# Patient Record
Sex: MALE | Race: BLACK OR AFRICAN AMERICAN | HISPANIC OR LATINO | Employment: FULL TIME | URBAN - METROPOLITAN AREA
[De-identification: names, ages, dates, MRNs, and addresses within clinical notes are randomized per-mention and may not be internally consistent; named-entity substitution may affect disease eponyms.]

---

## 2017-01-09 ENCOUNTER — OFFICE VISIT (OUTPATIENT)
Dept: INTERNAL MEDICINE | Facility: CLINIC | Age: 57
End: 2017-01-09
Payer: COMMERCIAL

## 2017-01-09 VITALS
SYSTOLIC BLOOD PRESSURE: 142 MMHG | OXYGEN SATURATION: 98 % | BODY MASS INDEX: 29.44 KG/M2 | DIASTOLIC BLOOD PRESSURE: 94 MMHG | HEIGHT: 72 IN | WEIGHT: 217.38 LBS | HEART RATE: 75 BPM | TEMPERATURE: 97 F

## 2017-01-09 DIAGNOSIS — M54.2 NECK PAIN: Primary | ICD-10-CM

## 2017-01-09 DIAGNOSIS — M54.10 RADICULITIS: ICD-10-CM

## 2017-01-09 DIAGNOSIS — Z00.00 PREVENTATIVE HEALTH CARE: ICD-10-CM

## 2017-01-09 PROCEDURE — 99213 OFFICE O/P EST LOW 20 MIN: CPT | Mod: S$GLB,,, | Performed by: INTERNAL MEDICINE

## 2017-01-09 PROCEDURE — 1159F MED LIST DOCD IN RCRD: CPT | Mod: S$GLB,,, | Performed by: INTERNAL MEDICINE

## 2017-01-09 PROCEDURE — 99999 PR PBB SHADOW E&M-EST. PATIENT-LVL III: CPT | Mod: PBBFAC,,, | Performed by: INTERNAL MEDICINE

## 2017-01-09 RX ORDER — METHYLPREDNISOLONE 4 MG/1
TABLET ORAL
Qty: 1 PACKAGE | Refills: 0 | Status: SHIPPED | OUTPATIENT
Start: 2017-01-09 | End: 2017-02-16

## 2017-01-09 RX ORDER — GABAPENTIN 100 MG/1
CAPSULE ORAL
Qty: 90 CAPSULE | Refills: 0 | Status: SHIPPED | OUTPATIENT
Start: 2017-01-09 | End: 2017-02-16 | Stop reason: SDUPTHER

## 2017-01-09 NOTE — MR AVS SNAPSHOT
[facility]  [address] [facility] [place] [address]  Phone: [phone]  Fax: [phone]                  Rafa Padilla   2017 2:20 PM   Office Visit    Description:  Male : 1960   Provider:  Da Weston MD   Department:  [facility]           Reason for Visit     Follow-up           Diagnoses this Visit        Comments    Neck pain    -  Primary     Radiculitis         Preventative health care                To Do List           Future Appointments        Provider Department Dept Phone    2017 8:05 AM LABORATORY, [facility] [phone]    3/6/2017 2:20 PM Da Weston MD [facility] [phone]      Goals (5 Years of Data)     None      Follow-Up and Disposition     Return in about 8 weeks (around 3/6/2017), or if symptoms worsen or fail to improve.       These Medications        Disp Refills Start End    gabapentin (NEURONTIN) 100 MG capsule 90 capsule 0 2017     TAKE 1 CAPSULE(100 MG) BY MOUTH THREE TIMES DAILY    Pharmacy: [facility] [address] [place] [address] [address] [employer] [facility] AT [facility] Ph #: [phone]       methylPREDNISolone (MEDROL, JESSA,) 4 mg tablet 1 Package 0 2017     use as directed    Pharmacy: [facility] [address] [place] [phone] [facility] AT [facility] Ph #: [phone]         [facility]     [facility] On Call Nurse Care Line -  Assistance  Registered nurses in the [facility] provide clinical advisement, health education, appointment booking, and other advisory services.  Call for this free service at [phone].             Medications           Message regarding Medications     Verify the changes and/or additions to your medication regime listed below are the same as discussed with your clinician today.  If any of these changes or additions are incorrect, please notify your  healthcare provider.        START taking these NEW medications        Refills    methylPREDNISolone (MEDROL, JESSA,) 4 mg tablet 0    Sig: use as directed    Class: Normal           Verify that the below list of medications is an accurate representation of the medications you are currently taking.  If none reported, the list may be blank. If incorrect, please contact your healthcare provider. Carry this list with you in case of emergency.           Current Medications     aspirin 81 mg Tab Take by mouth. 1 Tablet Oral Every day    gabapentin (NEURONTIN) 100 MG capsule TAKE 1 CAPSULE(100 MG) BY MOUTH THREE TIMES DAILY    meloxicam (MOBIC) 15 MG tablet TAKE 1 TABLET(15 MG) BY MOUTH EVERY DAY    tamsulosin (FLOMAX) 0.4 mg Cp24 Take 2 capsules (0.8 mg total) by mouth once daily.    methylPREDNISolone (MEDROL, JESSA,) 4 mg tablet use as directed           Clinical Reference Information           Vital Signs - Last Recorded  Most recent update: 1/9/2017  2:28 PM by Tamy Franks MA    BP Pulse Temp Ht Wt SpO2    (!) 142/94 (BP Location: Right arm, Patient Position: Sitting) 75 97.1 °F (36.2 °C) (Tympanic) 6' (1.829 m) 98.6 kg (217 lb 6 oz) 98%    BMI                29.48 kg/m2          Blood Pressure          Most Recent Value    BP  (!)  142/94      Allergies as of 1/9/2017     No Known Allergies      Immunizations Administered on Date of Encounter - 1/9/2017     None      Orders Placed During Today's Visit      Normal Orders This Visit    Ambulatory Referral to Physiatry     Future Labs/Procedures Expected by Expires    CBC auto differential  1/9/2017 3/10/2018    Comprehensive metabolic panel  1/9/2017 3/10/2018    Lipid panel  1/9/2017 3/10/2018    PSA, Screening  1/9/2017 3/10/2018    TSH  1/9/2017 3/10/2018    Vitamin D  1/9/2017 3/10/2018      Smoking Cessation     If you would like to quit smoking:   You may be eligible for free services if you are a [place] resident and started smoking cigarettes before  September 1, 1988.  Call the [employer]oking [employer]essation [employer] ([employer]) toll free at ([phone] or [phone].   Call 8-613-QUIT-NOW if you do not meet the above criteria.

## 2017-01-09 NOTE — PROGRESS NOTES
Subjective:      Patient ID: Rafa Padilla is a 56 y.o. male.    Chief Complaint: Follow-up    HPI Comments: 57 yo with Patient Active Problem List:     BPH (benign prostatic hyperplasia)     Hyperlipidemia     Spinal stenosis, lumbar     DDD (degenerative disc disease), lumbar     Anxiety     Tobacco use     Prediabetes    Here today here today for f/u on neck pain. Mild improvement since last visit.  Less intense and less frequency. No trauma.  No neck pain.      Review of Systems   Constitutional: Negative for chills and fever.   Respiratory: Negative for cough, shortness of breath and wheezing.    Neurological: Negative for syncope and weakness.     Objective:     Visit Vitals    BP (!) 142/94 (BP Location: Right arm, Patient Position: Sitting)    Pulse 75    Temp 97.1 °F (36.2 °C) (Tympanic)    Ht 6' (1.829 m)    Wt 98.6 kg (217 lb 6 oz)    SpO2 98%    BMI 29.48 kg/m2       Physical Exam   Constitutional: He is oriented to person, place, and time. He appears well-developed and well-nourished.   Eyes: EOM are normal. Pupils are equal, round, and reactive to light.   Neck: Normal range of motion.   Cardiovascular: Normal rate and regular rhythm.    Pulmonary/Chest: Effort normal and breath sounds normal.   Musculoskeletal: He exhibits no edema.   Neurological: He is alert and oriented to person, place, and time. He displays normal reflexes. No cranial nerve deficit. He exhibits normal muscle tone.   Skin: Skin is warm and dry.   Psychiatric: He has a normal mood and affect. His behavior is normal.       Assessment:     1. Neck pain    2. Radiculitis    3. Preventative health care      Plan:   Neck pain  -     Ambulatory Referral to Physiatry    Radiculitis  -     Ambulatory Referral to Physiatry  -     gabapentin (NEURONTIN) 100 MG capsule; TAKE 1 CAPSULE(100 MG) BY MOUTH THREE TIMES DAILY  Dispense: 90 capsule; Refill: 0  -     methylPREDNISolone (MEDROL, JESSA,) 4 mg tablet; use as directed  Dispense: 1  Package; Refill: 0    [facility]  -     Comprehensive metabolic panel; Future; Expected date: 1/9/17  -     CBC auto differential; Future; Expected date: 1/9/17  -     TSH; Future; Expected date: 1/9/17  -     Vitamin D; Future; Expected date: 1/9/17  -     PSA, Screening; Future; Expected date: 1/9/17  -     Lipid panel; Future; Expected date: 1/9/17              Return in about 8 weeks (around 3/6/2017), or if symptoms worsen or fail to improve.

## 2017-01-10 ENCOUNTER — INITIAL CONSULT (OUTPATIENT)
Dept: PHYSICAL MEDICINE AND REHAB | Facility: CLINIC | Age: 57
End: 2017-01-10
Payer: COMMERCIAL

## 2017-01-10 VITALS
SYSTOLIC BLOOD PRESSURE: 155 MMHG | RESPIRATION RATE: 14 BRPM | HEIGHT: 72 IN | WEIGHT: 217 LBS | BODY MASS INDEX: 29.39 KG/M2 | HEART RATE: 72 BPM | DIASTOLIC BLOOD PRESSURE: 78 MMHG

## 2017-01-10 DIAGNOSIS — M79.18 MYOFASCIAL PAIN: ICD-10-CM

## 2017-01-10 DIAGNOSIS — R29.898 ARM WEAKNESS: ICD-10-CM

## 2017-01-10 DIAGNOSIS — M47.812 DJD (DEGENERATIVE JOINT DISEASE), CERVICAL: Primary | ICD-10-CM

## 2017-01-10 PROCEDURE — 99999 PR PBB SHADOW E&M-EST. PATIENT-LVL III: CPT | Mod: PBBFAC,,, | Performed by: PHYSICAL MEDICINE & REHABILITATION

## 2017-01-10 PROCEDURE — 1159F MED LIST DOCD IN RCRD: CPT | Mod: S$GLB,,, | Performed by: PHYSICAL MEDICINE & REHABILITATION

## 2017-01-10 PROCEDURE — 99204 OFFICE O/P NEW MOD 45 MIN: CPT | Mod: S$GLB,,, | Performed by: PHYSICAL MEDICINE & REHABILITATION

## 2017-01-10 NOTE — PROGRESS NOTES
PM&R NEW PATIENT HISTORY & PHYSICAL :    Referring Physician: Enrico    Chief Complaint   Patient presents with    Neck Pain    Shoulder Pain     left, to the arm    Numbness     left arm       HPI: This is a 56 y.o.  male being seen in clinic today for evaluation of left neck and shoulder pain over the past 3 months.  He describes achy pain and tightness in his neck and shoulder that is worse with prolonged sitting at his computer at work.  He was recently started on steroid and gabapentin which provided relief. At times he has numbness/tingling into his arm and fingers    History obtained from patient    Functional History:  Walking: Not limited  Transfers: Independent  Assistive devices: No  Power mobility: No  Falls: None   Directional preference:    Employment status:    Needs help with:  Nothing - all ADLS normal    Review of Systems:     General- denies lethargy, weight change, fever, chills  Head/neck- denies swallowing difficulties  ENT- denies hearing changes  Cardiovascular-denies chest pain  Pulmonary- denies shortness of breath  GI- denies constipation or bowel incontinence  - denies bladder incontinence  Skin- denies wounds or rashes  Musculoskeletal- denies weakness, +pain  Neurologic- +/-numbness and tingling  Psychiatric- denies depressive or psychotic features, denies anxiety  Lymphatic-denies swelling  Endocrine- denies hypoglycemic symptoms/DM history    Physical Examination:  General: Well developed, well nourished male, NAD    Spinal Examination: CERVICAL  Active ROM is within normal limits.  Inspection: No deformity of spinal alignment.  Palpation: No vertebral tenderness to percussion.  ttp and tight bands at left trapezius, splenius capitus and rhomboid  Spurling test: neg    Spinal Examination: LUMBAR or THORACIC  Active ROM is within normal limits.  Inspection: No deformity of spinal alignment.  No palpable olisthesis.    Musculoskeletal Tests:  Phalen: neg   Elbow compression  (ulnar): neg  Tinel at wrist: neg  Scratch-Collapse Test:     Bilateral Upper and Lower Extremities:  Pulses are 2+ at radial,  bilaterally.  Shoulder/Elbow/Wrist/Hand ROM wnl except rot cuff weakness on left  Hip/Knee/Ankle ROM wnl  Bilateral Extremities show normal capillary refill.  No signs of cyanosis, rubor, edema, skin changes, or dysvascular changes of appendages.  Nails appear intact.    Neurological Exam:  Cranial Nerves:  II-XII grossly intact    Manual Muscle Testing: (Motor 5=normal)    RIGHT Upper extremity: Shoulder abduction 5/5, Biceps 5/5, Triceps 5/5, Wrist extension 5/5, Abductor pollicis brevis 5/5, Ulnar hand intrinsics 5/5,  LEFT Upper extremity: Shoulder abduction 5/5, Biceps 5/5, Triceps 5/5, Wrist extension 5/5, Abductor pollicis brevis 5/5, Ulnar hand intrinsics 5/5,      No focal atrophy is noted of either upper or lower extremity.    Bilateral Reflexes:1+bic tric br  Feldman's response is absent bilaterally.  No clonus at knee or ankle.    Sensation: tested to light touch  - intact in arms  Gait: Narrow base and good arm swing.    Cerebellar:tandem gait.     IMPRESSION/PLAN: This is a 56 y.o.  male with cervical DJD/DDD, left rotator cuff weakness, myofacial pain     1. rx for PT --Myofascial release, stretch, ROM, cuff strengthening, modalities, dec pain  2. Cont gabapentin and mobic (once steroid is finished)  3. Ice/heat modalities  4. Handouts provided  5. Fu 6 wks, if not improved with get NCS/EMG or MRI    Janel Medrano M.D.  Physical Medicine and Rehab

## 2017-01-10 NOTE — PATIENT INSTRUCTIONS
Ergonomics: Does Your Workstation Fit You?  You may not know it, but working at your computer can take a toll on your body. It can cause sore muscles, headaches, eyestrain, tension, and fatigue. But ergonomics, the science of arranging your workstation to fit you and your body, can help. Read on to find out how ergonomics can help solve problems you may be having.    If your eyes are really tired at the end of the day, adjusting your monitor or lighting may bring relief.  If your neck and shoulders are often stiff and sore, your chair height, monitor, or keyboard may need adjusting.  If you ever feel pain or discomfort in your back while working at your computer, adjusting your backrest or sitting posture may take strain off your back.  If you feel tingling, numbness, or pain in your forearms, wrists, or hands, your chair height or keyboard may need adjusting.  If your body feels tired, achy, or stiff at the end of the day, you may need to take more frequent stretch breaks.  If your legs are often stiff and cramped, or you have swelling and numbness in your ankles and feet, your chair might not be at the best height for you or you may need a footrest.  © 2066-1288 [employer]. [address] [place] [address], [place] [address]. All rights reserved. This information is not intended as a substitute for professional medical care. Always follow your healthcare professional's instructions.        Nonsurgical Treatment of Cervical Spine Problems  Cervical spine problems can often be treated without surgery. Choices include rest, medicines, or injections. Your healthcare provider may also suggest physical therapy or certain exercises. These may all help to relieve your symptoms. These treatments are often successful. But if your symptoms dont subside, talk to your healthcare provider. He or she may tell you that surgery is your best choice.  Relieving your symptoms  Your healthcare provider may  recommend:  · Medicines. These help to reduce pain and inflammation.  · Epidural steroid injections. These are injections into the spinal canal near the spinal cord. They may relieve severe pain and reduce inflammation.  · Restricting aggravating activities. This can help to give your cervical spine time to heal.  · A soft cervical collar. This can help to support your head while keeping your cervical spine aligned.  · Traction. This may help relieve pressure on the irritated nerves.  Restoring mobility and strength  Your healthcare provider may recommend that you work with a physical therapist. This can help you regain mobility and strength in your neck. Physical therapy may last for 4 to 8 weeks. It may include:  · Exercises to improve your necks range of motion and strength.  · Evaluation and correction of posture and body movements. This can correct problems that affect your cervical spine.  · Heat, massage, and traction to help relieve your symptoms.  Self-care  Youll take an active role in your own therapy. To protect your neck from further injury:  · Follow any exercise program given to you by your healthcare provider or physical therapist.  · Practice good posture while sitting, standing, or moving.  · Have your workspace evaluated. Rearrange it if needed.  · When lying down, support your neck. You can use a special cervical pillow or rolled-up towel.   © 3688-9341 [employer]. [address] [place] [address], [place] [address]. All rights reserved. This information is not intended as a substitute for professional medical care. Always follow your healthcare professional's instructions.        Myofascial Pain Syndrome: Fibrositis  Your pain is caused by a state of chronic muscle tension. This condition is called by various names: myofascial pain, fibrositis and trigger point pain. This can also be due to mechanical stress (such as working at a computer terminal for long periods; or work that requires  repetitive motions of the arms or hands) or emotional stress (such as problems on the job or in your personal life). Sometimes there is no obvious cause. The pain can occur in the area of the muscle spasm or at a site distant to it. For example, spasm of a neck muscle can cause headache. Spasm of the muscle near the shoulder blade can cause pain shooting down the arm.  Home Care:  · Try to identify the factors that may be causing your problem and change them:  ¨ If you feel that emotional stress is a cause of your pain, learn methods to deal more effectively with the stress in your life. These may include regular exercise, muscle relaxation techniques, meditation or simply taking time out for yourself. Consult your doctor or go to a local bookstore and review the many books and tapes available on the subject of stress reduction.  ¨ If you feel that physical stress is a cause for your pain, try to modify any poor work habits.  · You may use acetaminophen (Tylenol) or ibuprofen (Motrin, Advil) to control pain, unless another medicine was prescribed. [NOTE: If you have chronic liver or kidney disease or ever had a stomach ulcer or GI bleeding, talk with your doctor before using these medicines.]  · The use of heat to the muscle (hot compress or heating pad) will be helpful to reduce muscle spasm. Some persons get relief with ice packs. Apply an ice pack (crushed or cubed ice in a plastic bag, wrapped in a towel) for 20 minutes at a time as needed. Use the method that feels best to you.  · Massaging the trigger point and stretching out the muscle are an important parts of prevention and treatment. Trigger point massage can be done by first applying heat to the area to warm and prepare the muscle. Have someone apply steady thumb pressure directly on the knot in the muscle (the most tender point) for 30 seconds. Release the pressure, then massage the surrounding muscle. Repeat the process, applying more pressure to the  trigger point each time. Do this up to the limit of pain. With each treatment, the trigger point should become less tender and the pain should decrease. You can apply local pressure to trigger points in the back by lying on the floor with a tennis ball under the trigger point.  Follow Up  with your doctor as advised or if not improving within the next week. It may be necessary for you to receive physical therapy if you do not respond to home treatment alone.  Get Prompt Medical Attention  if any of the following occur:  · If your trigger point is in the chest muscles, observe for pain that becomes more severe, lasts longer, or spreads into your shoulder/arm, neck or back; you develop trouble breathing, sweating, nausea or vomiting in association with chest pain  · If you develop weakness or numbness in an extremity  · If your pain worsens, regardless of its location  © 8167-2408 [employer]. [address] [place] [address]. All rights reserved. This information is not intended as a substitute for professional medical care. Always follow your healthcare professional's instructions.          Trigger Point Injection  The cause of your muscle pain or spasms may be one or more trigger points. Your health care provider may decide to inject the painful spots to relax the muscle. This can help relieve your pain. Relaxing the muscle can also make movement easier. You may then be able to exercise to strengthen the muscle and help it heal.    What is a trigger point?  A trigger point is a tight, painful knot of muscle fiber. It can form where a muscle is strained or injured. The knot can sometimes be felt under the skin. A trigger point is very tender to the touch. Pain may also spread to other parts of the affected muscle. Muscles around a knee, shoulder blade, or other bones are prone to trigger points. This is because these muscles are more likely to be injured.    About the injections  Any muscle  in the body can have one or more trigger points. Several injections may be needed in each trigger point to best relieve pain. These injections may be given in sessions about 2 weeks apart, depending on the preference of your health care provider. In some cases, you may not feel much change in your symptoms until after the third injection.     © 7560-7299 [employer]. [address] [place] [address]. All rights reserved. This information is not intended as a substitute for professional medical care. Always follow your healthcare professional's instructions.            Your Neck Muscles  The muscles in the neck and shoulders need to be strong to hold the neck and head in place. These muscles also help move the neck and shoulders. Your health care provider can recommend exercises to help stretch and strengthen your neck muscles.    © 1952-6036 [employer]. [address] [place] [address]. All rights reserved. This information is not intended as a substitute for professional medical care. Always follow your healthcare professional's instructions.          Neck Problems: Relieving Your Symptoms  The first goal of treatment is to relieve your symptoms. Your health care provider may recommend self-care treatments. These include resting, applying ice and heat, taking medication, and doing exercises. Your health care provider may also recommend that you see a physical therapist, who can teach you ways to care for and strengthen your neck.    Self-Care Treatments  Pain can end quickly or last awhile. Either way, youll want relief as soon as possible. Your health care provider can tell you which treatments to do at home to help relieve your pain.  · Lying down for a short time takes pressure from the head off the neck.  · Ice and heat can help reduce pain. To bring down swelling, rest an ice pack wrapped in a thin towel on your neck for 15 minutes. To relax sore muscles, apply a  warm, wet towel to the area. Or take a warm bath or shower.  · Over-the-counter medications, such as ibuprofen, naproxen, and aspirin, can help reduce pain and swelling. Acetaminophen can help relieve pain. Use these only as directed.  · Exercises can relax muscles and prevent stiffness. To prepare, drape a warm, wet towel around your neck and shoulders for 5 minutes. Remove the towel. Then do any exercises recommended to you by your health care provider.  Physical Therapy  If self-care treatments arent helping relieve neck pain, your health care provider may suggest one or more sessions of physical therapy. Physical therapy is performed by a specialist trained to treat injuries. Your physical therapist (PT) will teach you how to strengthen muscles, improve the spines alignment, and help you move properly. Treatment methods used in physical therapy may include:  · Heat. A special heating pad called a neck pack may be applied to your neck.  · Exercises. Your PT will teach you exercises to help strengthen your neck and improve its range of motion.  · Joint mobilization. The PT gently moves your vertebrae to help restore motion in your neck joints and reduce neck pain.  · Soft tissue mobilization. The PT massages and stretches the muscles in your neck and shoulders.  · Electrical stimulation. Electrical impulses are sent into your neck. This helps reduce soreness and inflammation.  · Education in body mechanics. The PT shows you ways to position and move your body that protect the neck.  Other Treatments  If physical therapy doesnt relieve your neck pain, your health care provider may suggest other treatments. For example, medications or injections can help relieve pain and swelling. In some cases, surgery may be needed to treat neck problems.  © 6025-3646 [employer]. [address] [place] [address], [place] [address]. All rights reserved. This information is not intended as a substitute for professional  medical care. Always follow your healthcare professional's instructions.          Understanding Neck Problems       If you suffer from neck pain, youre not alone. Many people have neck pain at some point in their lives. Problems such as poor posture, injury, and wear and tear can lead to neck pain. Your health care provider will work with you to find the treatment thats best for your neck.  Types of Neck Problems  The following problems can cause pain or injury in your neck:  · Strains and sprains: Strains (stretched or torn muscles) and sprains (stretched or torn ligaments) can cause neck pain. Strains and sprains can occur during an accident, or when you overuse your neck through repetitive motion. They can also cause your muscles and ligaments to become inflamed (swollen and painful).  · Whiplash and other injuries: Whiplash can result when an impact throws your head, forcing your neck too far forward (hyperflexion), then too far backward (hyperextension). When combined, the two motions can cause a painful injury to different parts of your neck, such as muscles, ligaments, or joints. The most common cause of whiplash is a car accident. But it can also happen during a fall or sports injury.  · Weakened disks: A simple action, such as a sneeze or a cough, can cause one of your disks to bulge (herniate). A herniated disk can put pressure on your nerve and cause pain. Over time, disks can also thin out (degenerate). Flattened disks dont cushion vertebrae well and can cause vertebrae to rub together. Rubbing vertebrae can pinch nerves and cause pain.  · Weakened joints: Aging and injury can cause joints to slowly degenerate. Thinned joints can also cause vertebrae to rub together. This can cause abnormal growths of bone (bone spurs) to form on vertebrae. Bone spurs put pressure on nerves, causing pain.  Common Symptoms  If you have a neck problem, you may have one or more of the following symptoms:  · Muscle  tension and spasm: You may not be able to move your neck, arms, or shoulders comfortably if you have muscle tension or stiffness in your neck. If your symptoms arent relieved, you may experience muscle spasms, or knots of contracted tissue (trigger points) in areas of your neck and shoulders.  · Aches and pains: Dull aches in your head or neck, sharp pains, and swelling of the soft tissue of your neck and shoulders are common symptoms. If theres pressure on the nerves in your neck, you may feel pain in your arms or hands (referred pain).  · Numbness or weakness: If you injure the nerves in your neck, you may experience numbness, tingling, or weakness in your shoulders, arms, or hands. These symptoms arise when disks or bone spurs press on the nerves in your neck.  © 6177-2191 [employer]. [address] [place] [address]. All rights reserved. This information is not intended as a substitute for professional medical care. Always follow your healthcare professional's instructions.          Neck Spasm [No Trauma]    Spasm of the neck muscles can occur after a sudden awkward neck movement. Sleeping with your neck in a crooked position can also cause spasm. Some persons respond to emotional stress by tensing the muscles of their neck, shoulders and upper back. If neck spasm lasts long enough, it can cause headache.  The treatment described below will usually help the pain to go away in 5-7 days. Pain that continues may require further evaluation or other types of treatment such as physical therapy.  Home Care:  1. Rest and relax the muscles. Use a comfortable pillow that supports the head and keeps the spine in a neutral position. The position of the head should not be tilted forward or backward. A rolled up towel may help for a custom fit.  2. Some persons find relief with heat (hot shower, hot bath or heating pad) and massage, while others prefer cold packs (crushed or cubed ice in a plastic  bag, wrapped in a towel). Try both and use the method that feels best for 20 minutes several times a day.  3. You may use acetaminophen (Tylenol) or ibuprofen (Motrin, Advil) to control pain, unless another medicine was prescribed. [NOTE: If you have chronic liver or kidney disease or ever had a stomach ulcer or GI bleeding, talk with your doctor before using these medicines.]  Follow Up  with your doctor or this facility if your symptoms do not show signs of improvement after one week. Physical therapy or further evaluation may be needed.  [NOTE: If x-rays were taken, they will be reviewed by a radiologist. You will be notified of any new findings that may affect your care.]  Return Promptly  or contact your doctor if any of the following occurs:  · Pain becomes worse or spreads into one or both arms  · Weakness or numbness in one or both arms  · Increasing headache with nausea or vomiting  · Fever over 100.4ºF (38.0ºC)  © 1181-7905 [employer] [address] [place] [address]. All rights reserved. This information is not intended as a substitute for professional medical care. Always follow your healthcare professional's instructions.          Know Your Neck: The Cervical Spine  By learning about the parts of the neck, you can better understand your neck problem. The bones of the neck are called cervical vertebrae, commonly identified as C1 through C7. Together, they form a bony column called the spine. Vertebrae also protect the spinal cord, a pathway for messages to reach the brain. Surrounding the spine are soft tissues such as muscles, tendons, and nerves.        Flexibility Is Key  For the neck to function normally, it has to be flexible enough to move without discomfort. A healthy neck can move easily in six different directions.    © 8771-2913 [employer] [address] [place] [address]. All rights reserved. This information is not intended as a substitute for  professional medical care. Always follow your healthcare professional's instructions.          Protecting Your Neck: Posture and Body Mechanics  Protecting your neck from injuries and pain involves practicing good posture and body mechanics. This may mean correcting bad habits you have related to the way you hold and move your body. The tips below can help you improve your posture and body mechanics.    What Is Posture and Why Does It Matter?  Posture is the way you hold your body. For many of us, this means hunching over, thrusting the chin forward, and slouching the shoulders. But this kind of poor posture keeps muscles from properly supporting the neck and puts stress on muscles, disks, ligaments, and joints in your neck. As a result, injury and pain can occur.  How Is Your Posture?  Use a full-length mirror to check your posture. To begin, stand normally. Then slowly back up against a wall. Is there space between your head and the wall? Do you slouch your shoulders? Is your chin pointing up or down? All these can cause neck pain and injury.  Improving Your Posture  Follow these steps to improve your posture:  · Pull your shoulders back.  · Think of the ears, shoulders, and hips as a series of dots. Now, adjust your body to connect the dots in a straight line.  · Keep your chin level.  What Are Body Mechanics and Why Do They Matter?  The way you move and position your body during daily activities is called body mechanics. Good body mechanics help protect the neck. This means learning the right ways to stand, sit, and even sleep. So do whats best for your neck and practice good body mechanics.  Standing   To protect your neck while standing:  · Carry objects close to your body.  · Keep your ears and shoulders in a line while standing or walking.  · To lower yourself, bend at the knees with a straight back. Do this instead of looking down and reaching for objects.  · Work at eye level. Dont reach above your head  or tilt your head back.  Sitting   To protect your neck while sitting:  · Set up your workstation so your monitor is at eye level. Also, use a document phillips when viewing papers or books.  · Keep your knees at or slightly below the level of your hips.  · Sit up straight, with feet flat on the floor. If your feet dont touch the floor, use a footrest.  · Avoid sitting or driving for long periods. Take frequent breaks.  Sleeping   To protect your neck while sleeping:  · Sleep on your back with a pillow under your knees, or on your side with a pillow between bent knees. This helps align the spine.  · Avoid using pillows that are too high or too low. Instead, use a neck roll or pillow under your neck while you sleep to keep the neck straight.  · Sleep on a mattress that supports you, with a pillow under your neck.  © 0484-6339 [employer]. [address] [place], [place] [id number]. All rights reserved. This information is not intended as a substitute for professional medical care. Always follow your healthcare professional's instructions.          Exercises at Your Workstation: Eyes, Neck, and Head     Tired eyes? Stiff neck? A few easy moves can help prevent these kinds of problems. Take a few minutes during your day to do these exercises--right at your desk. They'll loosen up your muscles, keep you more alert, and make a big difference in how you work and feel.    For your eyes  Eye cup  · Lean forward with your elbows on your desk.  · Cup your hands and place them lightly over your closed eyes. Hold for a minute, while breathing deeply in and out.  · Slowly uncover and open your eyes. Repeat 2 times.  Eye roll  · Close your eyes. Slowly roll your eyeballs clockwise all the way around. Repeat 3 times.  · Now slowly roll them all the way around counterclockwise. Repeat 3 times.  Eye rest  · Every 20 minutes, look away from the computer screen. Focus on an object at least 20 feet away. Stay focused on  this object for a full 20 seconds.    For your neck and head  Warm-up  · Drop your head gently to your chest. While breathing in, slowly roll your head up to your left shoulder. While breathing out, slowly roll your head back to center. Repeat to the right.  · Repeat 3 times on each side.  Head tilt  · Sit up straight. Tuck in your chin.  · Slowly tip your head to the left. Return to the center. Then, tip your head to the right.  · Repeat 3 times on each side.    Head turn  · Sit up straight.  · Slowly turn your head and look over your left shoulder. Hold for a few seconds. Go back to the center, then repeat to your right.  · Repeat 3 times on each side.  © 5908-5767 [employer]. [address] [place] [address]. All rights reserved. This information is not intended as a substitute for professional medical care. Always follow your healthcare professional's instructions.          Reach and Hold Exercise    Do this exercise on your hands and knees. Keep your knees under your hips and your hands under your shoulders. Keep your spine in a neutral position (not arched or sagging). Keep your ears in line with your shoulders. Hold for a few seconds before starting the exercise:  4. Tighten your abdominal muscles and raise one arm straight in front of you, palm down. Hold for 5 seconds, then lower. Repeat 5 times.  5. Do the exercise again, this time lifting your arm to the side. Repeat 5 times.  6. Do the exercise again, this time lifting your arm backward, palm up. Repeat 5 times.  Switch sides and do each exercise with the other arm.  © 5122-4282 [employer]. [address] [place] [address]. All rights reserved. This information is not intended as a substitute for professional medical care. Always follow your healthcare professional's instructions.        Shoulder and Upper Back Stretch  To start, stand tall with your ears, shoulders, and hips in line. Your feet should be  slightly apart, positioned just under your hips. Focus your eyes directly in front of you.  this position for a few seconds before starting your exercise. This helps increase your awareness of proper posture.  Reach overhead and slightly back with both arms. Keep your shoulders and neck aligned and your elbows behind your shoulders:  · With your palms facing the ceiling, turn your fingers inward.  · Take a deep breath. Breathe out, and lower your elbows toward your buttocks. Hold for 5 seconds, then return to starting position.  · Repeat 3 times.    © 5506-0385 [employer] [address] [place] [address]. All rights reserved. This information is not intended as a substitute for professional medical care. Always follow your healthcare professional's instructions.          Shoulder Clock Exercise  To start, stand tall with your ears, shoulders, and hips in line. Your feet should be slightly apart, positioned just under your hips. Focus your eyes directly in front of you.  this position for a few seconds before starting your exercise. This helps increase your awareness of proper posture.  · Imagine that your right shoulder is the center of a clock. With the outer point of your shoulder, roll it around to slowly trace the outer edge of the clock.  · Move clockwise first, then counterclockwise.  · Repeat 3 times. Switch shoulders.   © 2320-0751 [employer] [address] [place] [address]. All rights reserved. This information is not intended as a substitute for professional medical care. Always follow your healthcare professional's instructions.          Shoulder Girdle Stretch     To start, sit in a chair with your feet flat on the floor. Your weight should be slightly forward so that youre balanced evenly on your buttocks. Relax your shoulders and keep your head level. Using a chair with arms may help you keep your balance:  · Place 1 hand on the  outside elbow of the other arm.  · Pull the arm across your body. Hold for 30 to 60 seconds. Repeat once.  · Switch sides.    © 0819-4719 [employer]. [address] [place] [address]. All rights reserved. This information is not intended as a substitute for professional medical care. Always follow your healthcare professional's instructions.          Shoulder Exercises      To start, sit in a chair with your feet flat on the floor. Your weight should be slightly forward so that youre balanced evenly on your buttocks. Relax your shoulders and keep your head level. Avoid arching your back or rounding your shoulders. Using a chair with arms may help you keep your balance.  · Raise your arms, elbows bent, to shoulder height.  · Slowly move your forearms together. Hold for 5 seconds.  · Return to starting position. Repeat 5 times.  © 2953-0987 [employer]. [address] [place] [address]. All rights reserved. This information is not intended as a substitute for professional medical care. Always follow your healthcare professional's instructions.        Shoulder Shrug Exercise  To start, sit in a chair with your feet flat on the floor. Shift your weight slightly forward to avoid rounding your back. Relax. Keep your ears, shoulders, and hips aligned:  · Raise both of your shoulders as high as you can, as if you were trying to touch them to your ears. Keep your head and neck still and relaxed.  · Hold for a count of 10. Release.  · Repeat 5 times.    © 8941-9705 [employer]. [address] [place] [address]. All rights reserved. This information is not intended as a substitute for professional medical care. Always follow your healthcare professional's instructions.          Shoulder Squeeze Exercise     To start, sit in a chair with your feet flat on the floor. Shift your weight slightly forward to avoid rounding your back. Relax. Keep your ears,  shoulders, and hips aligned:  · Raise your arms to shoulder height, elbows bent and palms forward.  · Move your arms back, squeezing your shoulder blades together.  · Hold for 10 seconds. Return to starting position.   · Repeat 5 times.     © 2131-4696 [employer]. [address] [place] [address], [place] [address]. All rights reserved. This information is not intended as a substitute for professional medical care. Always follow your healthcare professional's instructions.

## 2017-01-10 NOTE — LETTER
January 10, 2017      Da Weston MD  [address] [facility] [place] [address]           [facility] - Physiatry  [address] [facility] [place] [address]  Phone: [phone]  Fax: [phone]          Patient: Rafa Padilla   MR Number: [id number]   YOB: 1960   Date of Visit: 1/10/2017       Dear Dr. Da Weston:    Thank you for referring Rafa Padilla to me for evaluation. Attached you will find relevant portions of my assessment and plan of care.    If you have questions, please do not hesitate to call me. I look forward to following Rafa Padilla along with you.    Sincerely,    Janel Medrano MD    Enclosure  CC:  No Recipients    If you would like to receive this communication electronically, please contact [email or web address] or [phone] to request more information on [employer] Link access.    For providers and/or their staff who would like to refer a patient to [facility], please contact us through our one-stop-shop provider referral line, [facility], at [phone].    If you feel you have received this communication in error or would no longer like to receive these types of communications, please e-mail [email or web address]

## 2017-01-10 NOTE — MR AVS SNAPSHOT
[facility]  [address] [facility] [place] [address]  Phone: [phone]  Fax: [phone]                  Rafa Padilla   1/10/2017 10:00 AM   Initial consult    Description:  Male : 1960   Provider:  Janel Medrano MD   Department:  [facility]           Reason for Visit     Neck Pain     Shoulder Pain     Numbness           Diagnoses this Visit        Comments    DJD (degenerative joint disease), cervical    -  Primary     Myofascial pain         Arm weakness                To Do List           Future Appointments        Provider Department Dept Phone    2017 12:20 PM Janel Medrano MD [facility] [phone]    2017 8:05 AM LABORATORY, [facility] [phone]    3/6/2017 2:20 PM Da Weston MD [facility] Internal Medicine [phone]      Goals (5 Years of Data)     None      [facility]     [facility] - 24/7 Assistance  Registered nurses in the [facility] provide clinical advisement, health education, appointment booking, and other advisory services.  Call for this free service at [phone].             Medications           Message regarding Medications     Verify the changes and/or additions to your medication regime listed below are the same as discussed with your clinician today.  If any of these changes or additions are incorrect, please notify your healthcare provider.             Verify that the below list of medications is an accurate representation of the medications you are currently taking.  If none reported, the list may be blank. If incorrect, please contact your healthcare provider. Carry this list with you in case of emergency.           Current Medications     aspirin 81 mg Tab Take by mouth. 1 Tablet Oral Every day    methylPREDNISolone (MEDROL, JESSA,) 4 mg tablet use as directed    tamsulosin (FLOMAX) 0.4 mg Cp24 Take 2 capsules (0.8 mg total) by mouth once daily.     gabapentin (NEURONTIN) 100 MG capsule TAKE 1 CAPSULE(100 MG) BY MOUTH THREE TIMES DAILY    meloxicam (MOBIC) 15 MG tablet TAKE 1 TABLET(15 MG) BY MOUTH EVERY DAY           Clinical Reference Information           Vital Signs - Last Recorded  Most recent update: 1/10/2017 10:08 AM by Eugenie Sheriff LPN    BP Pulse Resp Ht Wt BMI    (!) 155/78 72 14 6' (1.829 m) 98.4 kg (217 lb) 29.43 kg/m2      Blood Pressure          Most Recent Value    BP  (!)  155/78      Allergies as of 1/10/2017     No Known Allergies      Immunizations Administered on Date of Encounter - 1/10/2017     None      Instructions      Ergonomics: Does Your Workstation Fit You?  You may not know it, but working at your computer can take a toll on your body. It can cause sore muscles, headaches, eyestrain, tension, and fatigue. But ergonomics, the science of arranging your workstation to fit you and your body, can help. Read on to find out how ergonomics can help solve problems you may be having.    If your eyes are really tired at the end of the day, adjusting your monitor or lighting may bring relief.  If your neck and shoulders are often stiff and sore, your chair height, monitor, or keyboard may need adjusting.  If you ever feel pain or discomfort in your back while working at your computer, adjusting your backrest or sitting posture may take strain off your back.  If you feel tingling, numbness, or pain in your forearms, wrists, or hands, your chair height or keyboard may need adjusting.  If your body feels tired, achy, or stiff at the end of the day, you may need to take more frequent stretch breaks.  If your legs are often stiff and cramped, or you have swelling and numbness in your ankles and feet, your chair might not be at the best height for you or you may need a footrest.  © 3761-3539 [employer]. [address] [place] [address], [place] [address]. All rights reserved. This information is not intended as a substitute for  professional medical care. Always follow your healthcare professional's instructions.        Nonsurgical Treatment of Cervical Spine Problems  Cervical spine problems can often be treated without surgery. Choices include rest, medicines, or injections. Your healthcare provider may also suggest physical therapy or certain exercises. These may all help to relieve your symptoms. These treatments are often successful. But if your symptoms dont subside, talk to your healthcare provider. He or she may tell you that surgery is your best choice.  Relieving your symptoms  Your healthcare provider may recommend:  · Medicines. These help to reduce pain and inflammation.  · Epidural steroid injections. These are injections into the spinal canal near the spinal cord. They may relieve severe pain and reduce inflammation.  · Restricting aggravating activities. This can help to give your cervical spine time to heal.  · A soft cervical collar. This can help to support your head while keeping your cervical spine aligned.  · Traction. This may help relieve pressure on the irritated nerves.  Restoring mobility and strength  Your healthcare provider may recommend that you work with a physical therapist. This can help you regain mobility and strength in your neck. Physical therapy may last for 4 to 8 weeks. It may include:  · Exercises to improve your necks range of motion and strength.  · Evaluation and correction of posture and body movements. This can correct problems that affect your cervical spine.  · Heat, massage, and traction to help relieve your symptoms.  Self-care  Youll take an active role in your own therapy. To protect your neck from further injury:  · Follow any exercise program given to you by your healthcare provider or physical therapist.  · Practice good posture while sitting, standing, or moving.  · Have your workspace evaluated. Rearrange it if needed.  · When lying down, support your neck. You can use a special  cervical pillow or rolled-up towel.   © 0540-1316 [employer]. [address] [place] [address], [place] [address]. All rights reserved. This information is not intended as a substitute for professional medical care. Always follow your healthcare professional's instructions.        Myofascial Pain Syndrome: Fibrositis  Your pain is caused by a state of chronic muscle tension. This condition is called by various names: myofascial pain, fibrositis and trigger point pain. This can also be due to mechanical stress (such as working at a computer terminal for long periods; or work that requires repetitive motions of the arms or hands) or emotional stress (such as problems on the job or in your personal life). Sometimes there is no obvious cause. The pain can occur in the area of the muscle spasm or at a site distant to it. For example, spasm of a neck muscle can cause headache. Spasm of the muscle near the shoulder blade can cause pain shooting down the arm.  Home Care:  · Try to identify the factors that may be causing your problem and change them:  ¨ If you feel that emotional stress is a cause of your pain, learn methods to deal more effectively with the stress in your life. These may include regular exercise, muscle relaxation techniques, meditation or simply taking time out for yourself. Consult your doctor or go to a local bookstore and review the many books and tapes available on the subject of stress reduction.  ¨ If you feel that physical stress is a cause for your pain, try to modify any poor work habits.  · You may use acetaminophen (Tylenol) or ibuprofen (Motrin, Advil) to control pain, unless another medicine was prescribed. [NOTE: If you have chronic liver or kidney disease or ever had a stomach ulcer or GI bleeding, talk with your doctor before using these medicines.]  · The use of heat to the muscle (hot compress or heating pad) will be helpful to reduce muscle spasm. Some persons get relief with ice  packs. Apply an ice pack (crushed or cubed ice in a plastic bag, wrapped in a towel) for 20 minutes at a time as needed. Use the method that feels best to you.  · Massaging the trigger point and stretching out the muscle are an important parts of prevention and treatment. Trigger point massage can be done by first applying heat to the area to warm and prepare the muscle. Have someone apply steady thumb pressure directly on the knot in the muscle (the most tender point) for 30 seconds. Release the pressure, then massage the surrounding muscle. Repeat the process, applying more pressure to the trigger point each time. Do this up to the limit of pain. With each treatment, the trigger point should become less tender and the pain should decrease. You can apply local pressure to trigger points in the back by lying on the floor with a tennis ball under the trigger point.  Follow Up  with your doctor as advised or if not improving within the next week. It may be necessary for you to receive physical therapy if you do not respond to home treatment alone.  Get Prompt Medical Attention  if any of the following occur:  · If your trigger point is in the chest muscles, observe for pain that becomes more severe, lasts longer, or spreads into your shoulder/arm, neck or back; you develop trouble breathing, sweating, nausea or vomiting in association with chest pain  · If you develop weakness or numbness in an extremity  · If your pain worsens, regardless of its location  © 8542-9051 [employer]. [address] [place] [address], [place] [address]. All rights reserved. This information is not intended as a substitute for professional medical care. Always follow your healthcare professional's instructions.          Trigger Point Injection  The cause of your muscle pain or spasms may be one or more trigger points. Your health care provider may decide to inject the painful spots to relax the muscle. This can help relieve your pain.  Relaxing the muscle can also make movement easier. You may then be able to exercise to strengthen the muscle and help it heal.    What is a trigger point?  A trigger point is a tight, painful knot of muscle fiber. It can form where a muscle is strained or injured. The knot can sometimes be felt under the skin. A trigger point is very tender to the touch. Pain may also spread to other parts of the affected muscle. Muscles around a knee, shoulder blade, or other bones are prone to trigger points. This is because these muscles are more likely to be injured.    About the injections  Any muscle in the body can have one or more trigger points. Several injections may be needed in each trigger point to best relieve pain. These injections may be given in sessions about 2 weeks apart, depending on the preference of your health care provider. In some cases, you may not feel much change in your symptoms until after the third injection.     © 6475-1106 [employer]. [address] [place] [address]. All rights reserved. This information is not intended as a substitute for professional medical care. Always follow your healthcare professional's instructions.            Your Neck Muscles  The muscles in the neck and shoulders need to be strong to hold the neck and head in place. These muscles also help move the neck and shoulders. Your health care provider can recommend exercises to help stretch and strengthen your neck muscles.    © 7203-4534 [employer]. [address] [place] [address]. All rights reserved. This information is not intended as a substitute for professional medical care. Always follow your healthcare professional's instructions.          Neck Problems: Relieving Your Symptoms  The first goal of treatment is to relieve your symptoms. Your health care provider may recommend self-care treatments. These include resting, applying ice and heat, taking medication, and doing  exercises. Your health care provider may also recommend that you see a physical therapist, who can teach you ways to care for and strengthen your neck.    Self-Care Treatments  Pain can end quickly or last awhile. Either way, youll want relief as soon as possible. Your health care provider can tell you which treatments to do at home to help relieve your pain.  · Lying down for a short time takes pressure from the head off the neck.  · Ice and heat can help reduce pain. To bring down swelling, rest an ice pack wrapped in a thin towel on your neck for 15 minutes. To relax sore muscles, apply a warm, wet towel to the area. Or take a warm bath or shower.  · Over-the-counter medications, such as ibuprofen, naproxen, and aspirin, can help reduce pain and swelling. Acetaminophen can help relieve pain. Use these only as directed.  · Exercises can relax muscles and prevent stiffness. To prepare, drape a warm, wet towel around your neck and shoulders for 5 minutes. Remove the towel. Then do any exercises recommended to you by your health care provider.  Physical Therapy  If self-care treatments arent helping relieve neck pain, your health care provider may suggest one or more sessions of physical therapy. Physical therapy is performed by a specialist trained to treat injuries. Your physical therapist (PT) will teach you how to strengthen muscles, improve the spines alignment, and help you move properly. Treatment methods used in physical therapy may include:  · Heat. A special heating pad called a neck pack may be applied to your neck.  · Exercises. Your PT will teach you exercises to help strengthen your neck and improve its range of motion.  · Joint mobilization. The PT gently moves your vertebrae to help restore motion in your neck joints and reduce neck pain.  · Soft tissue mobilization. The PT massages and stretches the muscles in your neck and shoulders.  · Electrical stimulation. Electrical impulses are sent into  your neck. This helps reduce soreness and inflammation.  · Education in body mechanics. The PT shows you ways to position and move your body that protect the neck.  Other Treatments  If physical therapy doesnt relieve your neck pain, your health care provider may suggest other treatments. For example, medications or injections can help relieve pain and swelling. In some cases, surgery may be needed to treat neck problems.  © 3495-3720 [employer]. [address] [place] [address], [place] [address]. All rights reserved. This information is not intended as a substitute for professional medical care. Always follow your healthcare professional's instructions.          Understanding Neck Problems       If you suffer from neck pain, youre not alone. Many people have neck pain at some point in their lives. Problems such as poor posture, injury, and wear and tear can lead to neck pain. Your health care provider will work with you to find the treatment thats best for your neck.  Types of Neck Problems  The following problems can cause pain or injury in your neck:  · Strains and sprains: Strains (stretched or torn muscles) and sprains (stretched or torn ligaments) can cause neck pain. Strains and sprains can occur during an accident, or when you overuse your neck through repetitive motion. They can also cause your muscles and ligaments to become inflamed (swollen and painful).  · Whiplash and other injuries: Whiplash can result when an impact throws your head, forcing your neck too far forward (hyperflexion), then too far backward (hyperextension). When combined, the two motions can cause a painful injury to different parts of your neck, such as muscles, ligaments, or joints. The most common cause of whiplash is a car accident. But it can also happen during a fall or sports injury.  · Weakened disks: A simple action, such as a sneeze or a cough, can cause one of your disks to bulge (herniate). A herniated disk can put  pressure on your nerve and cause pain. Over time, disks can also thin out (degenerate). Flattened disks dont cushion vertebrae well and can cause vertebrae to rub together. Rubbing vertebrae can pinch nerves and cause pain.  · Weakened joints: Aging and injury can cause joints to slowly degenerate. Thinned joints can also cause vertebrae to rub together. This can cause abnormal growths of bone (bone spurs) to form on vertebrae. Bone spurs put pressure on nerves, causing pain.  Common Symptoms  If you have a neck problem, you may have one or more of the following symptoms:  · Muscle tension and spasm: You may not be able to move your neck, arms, or shoulders comfortably if you have muscle tension or stiffness in your neck. If your symptoms arent relieved, you may experience muscle spasms, or knots of contracted tissue (trigger points) in areas of your neck and shoulders.  · Aches and pains: Dull aches in your head or neck, sharp pains, and swelling of the soft tissue of your neck and shoulders are common symptoms. If theres pressure on the nerves in your neck, you may feel pain in your arms or hands (referred pain).  · Numbness or weakness: If you injure the nerves in your neck, you may experience numbness, tingling, or weakness in your shoulders, arms, or hands. These symptoms arise when disks or bone spurs press on the nerves in your neck.  © 5834-8316 [employer]. [address] [place] [address], [place] [address]. All rights reserved. This information is not intended as a substitute for professional medical care. Always follow your healthcare professional's instructions.          Neck Spasm [No Trauma]    Spasm of the neck muscles can occur after a sudden awkward neck movement. Sleeping with your neck in a crooked position can also cause spasm. Some persons respond to emotional stress by tensing the muscles of their neck, shoulders and upper back. If neck spasm lasts long enough, it can cause  headache.  The treatment described below will usually help the pain to go away in 5-7 days. Pain that continues may require further evaluation or other types of treatment such as physical therapy.  Home Care:  1. Rest and relax the muscles. Use a comfortable pillow that supports the head and keeps the spine in a neutral position. The position of the head should not be tilted forward or backward. A rolled up towel may help for a custom fit.  2. Some persons find relief with heat (hot shower, hot bath or heating pad) and massage, while others prefer cold packs (crushed or cubed ice in a plastic bag, wrapped in a towel). Try both and use the method that feels best for 20 minutes several times a day.  3. You may use acetaminophen (Tylenol) or ibuprofen (Motrin, Advil) to control pain, unless another medicine was prescribed. [NOTE: If you have chronic liver or kidney disease or ever had a stomach ulcer or GI bleeding, talk with your doctor before using these medicines.]  Follow Up  with your doctor or this facility if your symptoms do not show signs of improvement after one week. Physical therapy or further evaluation may be needed.  [NOTE: If x-rays were taken, they will be reviewed by a radiologist. You will be notified of any new findings that may affect your care.]  Return Promptly  or contact your doctor if any of the following occurs:  · Pain becomes worse or spreads into one or both arms  · Weakness or numbness in one or both arms  · Increasing headache with nausea or vomiting  · Fever over 100.4ºF (38.0ºC)  © 2624-3686 [employer]. [address] [place] [address], [place] [address]. All rights reserved. This information is not intended as a substitute for professional medical care. Always follow your healthcare professional's instructions.          Know Your Neck: The Cervical Spine  By learning about the parts of the neck, you can better understand your neck problem. The bones of the neck are called  cervical vertebrae, commonly identified as C1 through C7. Together, they form a bony column called the spine. Vertebrae also protect the spinal cord, a pathway for messages to reach the brain. Surrounding the spine are soft tissues such as muscles, tendons, and nerves.        Flexibility Is Key  For the neck to function normally, it has to be flexible enough to move without discomfort. A healthy neck can move easily in six different directions.    © 6646-2490 [employer]. [address] [place] [address], [place] [address]. All rights reserved. This information is not intended as a substitute for professional medical care. Always follow your healthcare professional's instructions.          Protecting Your Neck: Posture and Body Mechanics  Protecting your neck from injuries and pain involves practicing good posture and body mechanics. This may mean correcting bad habits you have related to the way you hold and move your body. The tips below can help you improve your posture and body mechanics.    What Is Posture and Why Does It Matter?  Posture is the way you hold your body. For many of us, this means hunching over, thrusting the chin forward, and slouching the shoulders. But this kind of poor posture keeps muscles from properly supporting the neck and puts stress on muscles, disks, ligaments, and joints in your neck. As a result, injury and pain can occur.  How Is Your Posture?  Use a full-length mirror to check your posture. To begin, stand normally. Then slowly back up against a wall. Is there space between your head and the wall? Do you slouch your shoulders? Is your chin pointing up or down? All these can cause neck pain and injury.  Improving Your Posture  Follow these steps to improve your posture:  · Pull your shoulders back.  · Think of the ears, shoulders, and hips as a series of dots. Now, adjust your body to connect the dots in a straight line.  · Keep your chin level.  What Are Body Mechanics and Why  Do They Matter?  The way you move and position your body during daily activities is called body mechanics. Good body mechanics help protect the neck. This means learning the right ways to stand, sit, and even sleep. So do whats best for your neck and practice good body mechanics.  Standing   To protect your neck while standing:  · Carry objects close to your body.  · Keep your ears and shoulders in a line while standing or walking.  · To lower yourself, bend at the knees with a straight back. Do this instead of looking down and reaching for objects.  · Work at eye level. Dont reach above your head or tilt your head back.  Sitting   To protect your neck while sitting:  · Set up your workstation so your monitor is at eye level. Also, use a document phillips when viewing papers or books.  · Keep your knees at or slightly below the level of your hips.  · Sit up straight, with feet flat on the floor. If your feet dont touch the floor, use a footrest.  · Avoid sitting or driving for long periods. Take frequent breaks.  Sleeping   To protect your neck while sleeping:  · Sleep on your back with a pillow under your knees, or on your side with a pillow between bent knees. This helps align the spine.  · Avoid using pillows that are too high or too low. Instead, use a neck roll or pillow under your neck while you sleep to keep the neck straight.  · Sleep on a mattress that supports you, with a pillow under your neck.  © 2739-9012 [employer]. [address] [place] [address]. All rights reserved. This information is not intended as a substitute for professional medical care. Always follow your healthcare professional's instructions.          Exercises at Your Workstation: Eyes, Neck, and Head     Tired eyes? Stiff neck? A few easy moves can help prevent these kinds of problems. Take a few minutes during your day to do these exercises--right at your desk. They'll loosen up your muscles, keep you more  alert, and make a big difference in how you work and feel.    For your eyes  Eye cup  · Lean forward with your elbows on your desk.  · Cup your hands and place them lightly over your closed eyes. Hold for a minute, while breathing deeply in and out.  · Slowly uncover and open your eyes. Repeat 2 times.  Eye roll  · Close your eyes. Slowly roll your eyeballs clockwise all the way around. Repeat 3 times.  · Now slowly roll them all the way around counterclockwise. Repeat 3 times.  Eye rest  · Every 20 minutes, look away from the computer screen. Focus on an object at least 20 feet away. Stay focused on this object for a full 20 seconds.    For your neck and head  Warm-up  · Drop your head gently to your chest. While breathing in, slowly roll your head up to your left shoulder. While breathing out, slowly roll your head back to center. Repeat to the right.  · Repeat 3 times on each side.  Head tilt  · Sit up straight. Tuck in your chin.  · Slowly tip your head to the left. Return to the center. Then, tip your head to the right.  · Repeat 3 times on each side.    Head turn  · Sit up straight.  · Slowly turn your head and look over your left shoulder. Hold for a few seconds. Go back to the center, then repeat to your right.  · Repeat 3 times on each side.  © 3955-7071 [employer]. [address] [place] [address]. All rights reserved. This information is not intended as a substitute for professional medical care. Always follow your healthcare professional's instructions.          Reach and Hold Exercise    Do this exercise on your hands and knees. Keep your knees under your hips and your hands under your shoulders. Keep your spine in a neutral position (not arched or sagging). Keep your ears in line with your shoulders. Hold for a few seconds before starting the exercise:  4. Tighten your abdominal muscles and raise one arm straight in front of you, palm down. Hold for 5 seconds, then lower.  Repeat 5 times.  5. Do the exercise again, this time lifting your arm to the side. Repeat 5 times.  6. Do the exercise again, this time lifting your arm backward, palm up. Repeat 5 times.  Switch sides and do each exercise with the other arm.  © 4640-7668 [employer]. [address] [place] [address]. All rights reserved. This information is not intended as a substitute for professional medical care. Always follow your healthcare professional's instructions.        Shoulder and Upper Back Stretch  To start, stand tall with your ears, shoulders, and hips in line. Your feet should be slightly apart, positioned just under your hips. Focus your eyes directly in front of you.  this position for a few seconds before starting your exercise. This helps increase your awareness of proper posture.  Reach overhead and slightly back with both arms. Keep your shoulders and neck aligned and your elbows behind your shoulders:  · With your palms facing the ceiling, turn your fingers inward.  · Take a deep breath. Breathe out, and lower your elbows toward your buttocks. Hold for 5 seconds, then return to starting position.  · Repeat 3 times.    © 5353-8346 [employer]. [address] [place] [address]. All rights reserved. This information is not intended as a substitute for professional medical care. Always follow your healthcare professional's instructions.          Shoulder Clock Exercise  To start, stand tall with your ears, shoulders, and hips in line. Your feet should be slightly apart, positioned just under your hips. Focus your eyes directly in front of you.  this position for a few seconds before starting your exercise. This helps increase your awareness of proper posture.  · Imagine that your right shoulder is the center of a clock. With the outer point of your shoulder, roll it around to slowly trace the outer edge of the clock.  · Move clockwise first, then  counterclockwise.  · Repeat 3 times. Switch shoulders.   © 0013-1821 [employer]. [address] [place] [address]. All rights reserved. This information is not intended as a substitute for professional medical care. Always follow your healthcare professional's instructions.          Shoulder Girdle Stretch     To start, sit in a chair with your feet flat on the floor. Your weight should be slightly forward so that youre balanced evenly on your buttocks. Relax your shoulders and keep your head level. Using a chair with arms may help you keep your balance:  · Place 1 hand on the outside elbow of the other arm.  · Pull the arm across your body. Hold for 30 to 60 seconds. Repeat once.  · Switch sides.    © 7937-6955 [employer]. [address] [place] [address]. All rights reserved. This information is not intended as a substitute for professional medical care. Always follow your healthcare professional's instructions.          Shoulder Exercises      To start, sit in a chair with your feet flat on the floor. Your weight should be slightly forward so that youre balanced evenly on your buttocks. Relax your shoulders and keep your head level. Avoid arching your back or rounding your shoulders. Using a chair with arms may help you keep your balance.  · Raise your arms, elbows bent, to shoulder height.  · Slowly move your forearms together. Hold for 5 seconds.  · Return to starting position. Repeat 5 times.  © 6649-9791 [employer]. [address] [place] [address]. All rights reserved. This information is not intended as a substitute for professional medical care. Always follow your healthcare professional's instructions.        Shoulder Shrug Exercise  To start, sit in a chair with your feet flat on the floor. Shift your weight slightly forward to avoid rounding your back. Relax. Keep your ears, shoulders, and hips aligned:  · Raise both of your  shoulders as high as you can, as if you were trying to touch them to your ears. Keep your head and neck still and relaxed.  · Hold for a count of 10. Release.  · Repeat 5 times.    © 0490-1349 [employer]. [address] [place] [address]. All rights reserved. This information is not intended as a substitute for professional medical care. Always follow your healthcare professional's instructions.          Shoulder Squeeze Exercise     To start, sit in a chair with your feet flat on the floor. Shift your weight slightly forward to avoid rounding your back. Relax. Keep your ears, shoulders, and hips aligned:  · Raise your arms to shoulder height, elbows bent and palms forward.  · Move your arms back, squeezing your shoulder blades together.  · Hold for 10 seconds. Return to starting position.   · Repeat 5 times.     © 7417-3016 [employer]. [address] [place] [address]. All rights reserved. This information is not intended as a substitute for professional medical care. Always follow your healthcare professional's instructions.                 Smoking Cessation     If you would like to quit smoking:   You may be eligible for free services if you are a [place] resident and started smoking cigarettes before September 1, 1988.  Call the Smoking Cessation Trust (SCT) toll free at (683) 525-2455 or (964) 446-7703.   Call 6-800-QUIT-NOW if you do not meet the above criteria.

## 2017-01-29 DIAGNOSIS — M54.10 RADICULITIS: ICD-10-CM

## 2017-01-29 RX ORDER — MELOXICAM 15 MG/1
TABLET ORAL
Qty: 30 TABLET | Refills: 0 | Status: SHIPPED | OUTPATIENT
Start: 2017-01-29 | End: 2017-08-21

## 2017-02-16 ENCOUNTER — OFFICE VISIT (OUTPATIENT)
Dept: PHYSICAL MEDICINE AND REHAB | Facility: CLINIC | Age: 57
End: 2017-02-16
Payer: COMMERCIAL

## 2017-02-16 VITALS
SYSTOLIC BLOOD PRESSURE: 143 MMHG | RESPIRATION RATE: 14 BRPM | WEIGHT: 217 LBS | DIASTOLIC BLOOD PRESSURE: 76 MMHG | HEART RATE: 61 BPM | HEIGHT: 72 IN | BODY MASS INDEX: 29.39 KG/M2

## 2017-02-16 DIAGNOSIS — M54.10 RADICULITIS: ICD-10-CM

## 2017-02-16 DIAGNOSIS — M79.18 MYOFASCIAL PAIN: Primary | ICD-10-CM

## 2017-02-16 DIAGNOSIS — M47.812 DJD (DEGENERATIVE JOINT DISEASE), CERVICAL: ICD-10-CM

## 2017-02-16 PROCEDURE — 99213 OFFICE O/P EST LOW 20 MIN: CPT | Mod: S$GLB,,, | Performed by: PHYSICAL MEDICINE & REHABILITATION

## 2017-02-16 PROCEDURE — 99999 PR PBB SHADOW E&M-EST. PATIENT-LVL III: CPT | Mod: PBBFAC,,, | Performed by: PHYSICAL MEDICINE & REHABILITATION

## 2017-02-16 RX ORDER — GABAPENTIN 100 MG/1
CAPSULE ORAL
Qty: 90 CAPSULE | Refills: 5 | Status: SHIPPED | OUTPATIENT
Start: 2017-02-16 | End: 2018-04-12 | Stop reason: SDUPTHER

## 2017-02-16 NOTE — MR AVS SNAPSHOT
[facility]  [address] [facility] [place] [address]  Phone: [phone]  Fax: [phone]                  Rafa Padilla   2017 12:20 PM   Office Visit    Description:  Male : 1960   Provider:  Janel Medrano MD   Department:  [facility]           Reason for Visit     Shoulder Pain           Diagnoses this Visit        Comments    Myofascial pain    -  Primary     DJD (degenerative joint disease), cervical         Radiculitis                To Do List           Future Appointments        Provider Department Dept Phone    2017 12:20 PM Janel Medrano MD [facility] [phone]    2017 8:05 AM LABORATORY, [facility] [phone]    3/6/2017 2:20 PM Da Weston MD [facility] Internal Medicine [phone]      Goals (5 Years of Data)     None      Follow-Up and Disposition     Return if symptoms worsen or fail to improve.       These Medications        Disp Refills Start End    gabapentin (NEURONTIN) 100 MG capsule 90 capsule 5 2017     TAKE 1 CAPSULE(100 MG) BY MOUTH THREE TIMES DAILY    Pharmacy: [facility] [address] [place], [place] [address]ON [address] AT [facility] #: [phone]         [facility]     [facility] -  Assistance  Registered nurses in the [facility] provide clinical advisement, health education, appointment booking, and other advisory services.  Call for this free service at [phone].             Medications           Message regarding Medications     Verify the changes and/or additions to your medication regime listed below are the same as discussed with your clinician today.  If any of these changes or additions are incorrect, please notify your healthcare provider.        STOP taking these medications     methylPREDNISolone (MEDROL, JESSA,) 4 mg tablet use as directed           Verify that the below list of medications is  an accurate representation of the medications you are currently taking.  If none reported, the list may be blank. If incorrect, please contact your healthcare provider. Carry this list with you in case of emergency.           Current Medications     aspirin 81 mg Tab Take by mouth. 1 Tablet Oral Every day    gabapentin (NEURONTIN) 100 MG capsule TAKE 1 CAPSULE(100 MG) BY MOUTH THREE TIMES DAILY    meloxicam (MOBIC) 15 MG tablet TAKE 1 TABLET(15 MG) BY MOUTH EVERY DAY    tamsulosin (FLOMAX) 0.4 mg Cp24 Take 2 capsules (0.8 mg total) by mouth once daily.           Clinical Reference Information           Your Vitals Were     BP Pulse Resp Height Weight BMI    143/76 61 14 6' (1.829 m) 98.4 kg (217 lb) 29.43 kg/m2      Blood Pressure          Most Recent Value    BP  (!)  143/76      Allergies as of 2/16/2017     No Known Allergies      Immunizations Administered on Date of Encounter - 2/16/2017     None      Smoking Cessation     If you would like to quit smoking:   You may be eligible for free services if you are a [place] resident and started smoking cigarettes before September 1, 1988.  Call the Smoking Cessation Trust (SCT) toll free at (498) 249-7686 or (550) 649-3126.   Call 6-581-QUIT-NOW if you do not meet the above criteria.            Language Assistance Services     ATTENTION: Language assistance services are available, free of charge. Please call [phone].      ATENCIÓN: Si habla español, tiene a gaffney disposición servicios gratuitos de asistencia lingüística. Llame al [phone].     CHÚ Ý: N?u b?n nói Ti?ng Vi?t, có các d?ch v? h? tr? ngôn ng? mi?n phí dành cho b?n. G?i s? [phone].         [facility] complies with applicable Federal civil rights laws and does not discriminate on the basis of race, color, national origin, age, disability, or sex.

## 2017-02-16 NOTE — PROGRESS NOTES
PM&R CLINIC NOTE    Chief Complaint   Patient presents with    Shoulder Pain     left     HPI: This is a 56 y.o.  male being seen in clinic today for follow up of left neck and shoulder pain over the past few months.  He never made it to PT, but reports some relief with gabapentin.    History obtained from patient    Review of Systems:     General- denies lethargy, weight change, fever, chills  Head/neck- denies swallowing difficulties  ENT- denies hearing changes  Cardiovascular-denies chest pain  Pulmonary- denies shortness of breath  GI- denies constipation or bowel incontinence  - denies bladder incontinence  Skin- denies wounds or rashes  Musculoskeletal- denies weakness, +pain  Neurologic- +/-numbness and tingling  Psychiatric- denies depressive or psychotic features, denies anxiety  Lymphatic-denies swelling  Endocrine- denies hypoglycemic symptoms/DM history    Physical Examination:  General: Well developed, well nourished male, NAD    Spinal Examination: CERVICAL  Active ROM is within normal limits.  Inspection: No deformity of spinal alignment.  Palpation: mild ttp and tight bands at bilateral trapezius, mild ttp splenius capitus and rhomboid    Spinal Examination: LUMBAR or THORACIC  Active ROM is within normal limits.  Inspection: No deformity of spinal alignment.  No palpable olisthesis.    Bilateral Upper and Lower Extremities:  Shoulder/Elbow/Wrist/Hand ROM wnl except rot cuff weakness on left-some improvement   Hip/Knee/Ankle ROM wnl  Bilateral Extremities show normal capillary refill.  No signs of cyanosis, rubor, edema, skin changes, or dysvascular changes of appendages.  Nails appear intact.    Neurological Exam:  Cranial Nerves:  II-XII grossly intact    Manual Muscle Testing: (Motor 5=normal)    RIGHT Upper extremity: Shoulder abduction 5/5, Biceps 5/5, Triceps 5/5, Wrist extension 5/5, Abductor pollicis brevis 5/5, Ulnar hand intrinsics 5/5,  LEFT Upper extremity: Shoulder abduction 5/5,  Biceps 5/5, Triceps 5/5, Wrist extension 5/5, Abductor pollicis brevis 5/5, Ulnar hand intrinsics 5/5,    No focal atrophy is noted of either upper or lower extremity.  Gait: Narrow base and good arm swing.    IMPRESSION/PLAN: This is a 56 y.o.  male with cervical DJD/DDD, left rotator cuff weakness, myofacial pain     1. Cont HEP. Still encouraged to go to PT  2. Cont gabapentin and mobic prn  3. Ice/heat modalities  4. Fu prn    Janel Medrano M.D.  Physical Medicine and Rehab

## 2017-03-30 RX ORDER — TAMSULOSIN HYDROCHLORIDE 0.4 MG/1
0.8 CAPSULE ORAL DAILY
Qty: 60 CAPSULE | Refills: 0 | Status: SHIPPED | OUTPATIENT
Start: 2017-03-30 | End: 2017-05-10 | Stop reason: SDUPTHER

## 2017-05-08 RX ORDER — TAMSULOSIN HYDROCHLORIDE 0.4 MG/1
0.8 CAPSULE ORAL DAILY
Qty: 60 CAPSULE | Refills: 0 | OUTPATIENT
Start: 2017-05-08

## 2017-05-08 NOTE — TELEPHONE ENCOUNTER
LOV: 12/2/16     Last refill: 3/30/17 needed to scheduled apt for further refills.   Left a message informing the patient to call and schedule an apt.

## 2017-05-10 RX ORDER — TAMSULOSIN HYDROCHLORIDE 0.4 MG/1
0.8 CAPSULE ORAL DAILY
Qty: 60 CAPSULE | Refills: 0 | Status: SHIPPED | OUTPATIENT
Start: 2017-05-10 | End: 2017-06-13 | Stop reason: SDUPTHER

## 2017-06-13 RX ORDER — TAMSULOSIN HYDROCHLORIDE 0.4 MG/1
CAPSULE ORAL
Qty: 60 CAPSULE | Refills: 0 | Status: SHIPPED | OUTPATIENT
Start: 2017-06-13 | End: 2017-07-29 | Stop reason: SDUPTHER

## 2017-07-29 RX ORDER — TAMSULOSIN HYDROCHLORIDE 0.4 MG/1
CAPSULE ORAL
Qty: 60 CAPSULE | Refills: 0 | Status: SHIPPED | OUTPATIENT
Start: 2017-07-29 | End: 2017-08-21 | Stop reason: SDUPTHER

## 2017-08-07 ENCOUNTER — TELEPHONE (OUTPATIENT)
Dept: INTERNAL MEDICINE | Facility: CLINIC | Age: 57
End: 2017-08-07

## 2017-08-07 NOTE — TELEPHONE ENCOUNTER
Pt requested appt to see Dr. Weston re: grown on his neck.  Notified pt that first available appt with Dr. Weston is 8/16/17 but that I can scheduled him with another provider for an earlier date.  Pt verbalized understanding and stated he only wanted to see Dr. Weston.  Appt scheduled for 8/16/17.  Appt letter mailed to pt.  Pt also added to wait list.

## 2017-08-07 NOTE — TELEPHONE ENCOUNTER
----- Message from Mahin Branch sent at 8/7/2017 12:58 PM CDT -----  Contact: pt   States he is calling to be worked in to see Dr for growth on neck/painful at times and can be reached at [phone]//thanks/dbw

## 2017-08-21 ENCOUNTER — OFFICE VISIT (OUTPATIENT)
Dept: INTERNAL MEDICINE | Facility: CLINIC | Age: 57
End: 2017-08-21
Payer: COMMERCIAL

## 2017-08-21 VITALS
SYSTOLIC BLOOD PRESSURE: 136 MMHG | TEMPERATURE: 97 F | DIASTOLIC BLOOD PRESSURE: 90 MMHG | OXYGEN SATURATION: 99 % | HEIGHT: 72 IN | HEART RATE: 69 BPM | WEIGHT: 213.63 LBS | BODY MASS INDEX: 28.93 KG/M2

## 2017-08-21 DIAGNOSIS — N40.0 BENIGN PROSTATIC HYPERPLASIA WITHOUT LOWER URINARY TRACT SYMPTOMS: ICD-10-CM

## 2017-08-21 DIAGNOSIS — Z72.0 TOBACCO USE: ICD-10-CM

## 2017-08-21 DIAGNOSIS — E78.5 HYPERLIPIDEMIA, UNSPECIFIED HYPERLIPIDEMIA TYPE: ICD-10-CM

## 2017-08-21 DIAGNOSIS — R73.03 PREDIABETES: ICD-10-CM

## 2017-08-21 DIAGNOSIS — Z00.00 ROUTINE GENERAL MEDICAL EXAMINATION AT A HEALTH CARE FACILITY: Primary | ICD-10-CM

## 2017-08-21 DIAGNOSIS — L72.3 SEBACEOUS CYST: ICD-10-CM

## 2017-08-21 PROCEDURE — 99999 PR PBB SHADOW E&M-EST. PATIENT-LVL III: CPT | Mod: PBBFAC,,, | Performed by: INTERNAL MEDICINE

## 2017-08-21 PROCEDURE — 99396 PREV VISIT EST AGE 40-64: CPT | Mod: S$GLB,,, | Performed by: INTERNAL MEDICINE

## 2017-08-21 RX ORDER — TAMSULOSIN HYDROCHLORIDE 0.4 MG/1
CAPSULE ORAL
Qty: 180 CAPSULE | Refills: 3 | Status: SHIPPED | OUTPATIENT
Start: 2017-08-21 | End: 2018-09-16 | Stop reason: SDUPTHER

## 2018-04-12 DIAGNOSIS — M54.10 RADICULITIS: ICD-10-CM

## 2018-04-12 RX ORDER — GABAPENTIN 100 MG/1
CAPSULE ORAL
Qty: 90 CAPSULE | Refills: 5 | Status: SHIPPED | OUTPATIENT
Start: 2018-04-12 | End: 2019-01-21

## 2018-09-16 DIAGNOSIS — N40.0 BENIGN PROSTATIC HYPERPLASIA WITHOUT LOWER URINARY TRACT SYMPTOMS: ICD-10-CM

## 2018-09-17 RX ORDER — TAMSULOSIN HYDROCHLORIDE 0.4 MG/1
CAPSULE ORAL
Qty: 180 CAPSULE | Refills: 0 | Status: SHIPPED | OUTPATIENT
Start: 2018-09-17 | End: 2018-12-14 | Stop reason: SDUPTHER

## 2018-12-14 DIAGNOSIS — N40.0 BENIGN PROSTATIC HYPERPLASIA WITHOUT LOWER URINARY TRACT SYMPTOMS: ICD-10-CM

## 2018-12-14 RX ORDER — TAMSULOSIN HYDROCHLORIDE 0.4 MG/1
CAPSULE ORAL
Qty: 180 CAPSULE | Refills: 0 | Status: SHIPPED | OUTPATIENT
Start: 2018-12-14 | End: 2019-03-16 | Stop reason: SDUPTHER

## 2019-01-21 ENCOUNTER — HOSPITAL ENCOUNTER (OUTPATIENT)
Dept: RADIOLOGY | Facility: HOSPITAL | Age: 59
Discharge: HOME | End: 2019-01-21
Attending: INTERNAL MEDICINE
Payer: COMMERCIAL

## 2019-01-21 ENCOUNTER — TELEPHONE (OUTPATIENT)
Dept: INTERNAL MEDICINE | Facility: CLINIC | Age: 59
End: 2019-01-21

## 2019-01-21 ENCOUNTER — OFFICE VISIT (OUTPATIENT)
Dept: INTERNAL MEDICINE | Facility: CLINIC | Age: 59
End: 2019-01-21
Payer: COMMERCIAL

## 2019-01-21 VITALS
HEART RATE: 73 BPM | OXYGEN SATURATION: 96 % | SYSTOLIC BLOOD PRESSURE: 139 MMHG | BODY MASS INDEX: 28.79 KG/M2 | WEIGHT: 212.31 LBS | TEMPERATURE: 98 F | DIASTOLIC BLOOD PRESSURE: 83 MMHG

## 2019-01-21 DIAGNOSIS — Z72.0 TOBACCO USE: ICD-10-CM

## 2019-01-21 DIAGNOSIS — N40.0 BENIGN PROSTATIC HYPERPLASIA WITHOUT LOWER URINARY TRACT SYMPTOMS: ICD-10-CM

## 2019-01-21 DIAGNOSIS — N52.9 ERECTILE DYSFUNCTION, UNSPECIFIED ERECTILE DYSFUNCTION TYPE: ICD-10-CM

## 2019-01-21 DIAGNOSIS — Z12.11 COLON CANCER SCREENING: ICD-10-CM

## 2019-01-21 DIAGNOSIS — R73.03 PREDIABETES: ICD-10-CM

## 2019-01-21 DIAGNOSIS — L72.3 SEBACEOUS CYST: ICD-10-CM

## 2019-01-21 DIAGNOSIS — M25.551 RIGHT HIP PAIN: ICD-10-CM

## 2019-01-21 DIAGNOSIS — K21.9 GASTROESOPHAGEAL REFLUX DISEASE, ESOPHAGITIS PRESENCE NOT SPECIFIED: ICD-10-CM

## 2019-01-21 DIAGNOSIS — R03.0 ELEVATED BP WITHOUT DIAGNOSIS OF HYPERTENSION: ICD-10-CM

## 2019-01-21 DIAGNOSIS — Z23 NEED FOR INFLUENZA VACCINATION: ICD-10-CM

## 2019-01-21 DIAGNOSIS — Z00.00 ROUTINE GENERAL MEDICAL EXAMINATION AT A HEALTH CARE FACILITY: Primary | ICD-10-CM

## 2019-01-21 PROCEDURE — 99999 PR PBB SHADOW E&M-EST. PATIENT-LVL III: ICD-10-PCS | Mod: PBBFAC,,, | Performed by: INTERNAL MEDICINE

## 2019-01-21 PROCEDURE — 99999 PR PBB SHADOW E&M-EST. PATIENT-LVL III: CPT | Mod: PBBFAC,,, | Performed by: INTERNAL MEDICINE

## 2019-01-21 PROCEDURE — 99396 PREV VISIT EST AGE 40-64: CPT | Mod: 25,S$GLB,, | Performed by: INTERNAL MEDICINE

## 2019-01-21 PROCEDURE — 73502 X-RAY EXAM HIP UNI 2-3 VIEWS: CPT | Mod: 26,RT,, | Performed by: RADIOLOGY

## 2019-01-21 PROCEDURE — 73502 X-RAY EXAM HIP UNI 2-3 VIEWS: CPT | Mod: TC,RT

## 2019-01-21 PROCEDURE — 99396 PR PREVENTIVE VISIT,EST,40-64: ICD-10-PCS | Mod: 25,S$GLB,, | Performed by: INTERNAL MEDICINE

## 2019-01-21 PROCEDURE — 99214 OFFICE O/P EST MOD 30 MIN: CPT | Mod: S$GLB,,, | Performed by: INTERNAL MEDICINE

## 2019-01-21 PROCEDURE — 90686 FLU VACCINE (QUAD) GREATER THAN OR EQUAL TO 3YO PRESERVATIVE FREE IM: ICD-10-PCS | Mod: S$GLB,,, | Performed by: INTERNAL MEDICINE

## 2019-01-21 PROCEDURE — 90471 FLU VACCINE (QUAD) GREATER THAN OR EQUAL TO 3YO PRESERVATIVE FREE IM: ICD-10-PCS | Mod: S$GLB,,, | Performed by: INTERNAL MEDICINE

## 2019-01-21 PROCEDURE — 90471 IMMUNIZATION ADMIN: CPT | Mod: S$GLB,,, | Performed by: INTERNAL MEDICINE

## 2019-01-21 PROCEDURE — 73502 XR HIP 2 VIEW RIGHT: ICD-10-PCS | Mod: 26,RT,, | Performed by: RADIOLOGY

## 2019-01-21 PROCEDURE — 90686 IIV4 VACC NO PRSV 0.5 ML IM: CPT | Mod: S$GLB,,, | Performed by: INTERNAL MEDICINE

## 2019-01-21 PROCEDURE — 99214 PR OFFICE/OUTPT VISIT, EST, LEVL IV, 30-39 MIN: ICD-10-PCS | Mod: S$GLB,,, | Performed by: INTERNAL MEDICINE

## 2019-01-21 RX ORDER — SODIUM, POTASSIUM,MAG SULFATES 17.5-3.13G
SOLUTION, RECONSTITUTED, ORAL ORAL
Qty: 354 ML | Refills: 0 | Status: SHIPPED | OUTPATIENT
Start: 2019-01-21 | End: 2021-04-19 | Stop reason: ALTCHOICE

## 2019-01-21 RX ORDER — SILDENAFIL 100 MG/1
TABLET, FILM COATED ORAL
Qty: 15 TABLET | Refills: 1 | Status: SHIPPED | OUTPATIENT
Start: 2019-01-21

## 2019-01-21 RX ORDER — NAPROXEN 500 MG/1
500 TABLET ORAL 2 TIMES DAILY WITH MEALS
Qty: 30 TABLET | Refills: 0 | Status: SHIPPED | OUTPATIENT
Start: 2019-01-21 | End: 2019-05-24

## 2019-01-21 RX ORDER — ESOMEPRAZOLE MAGNESIUM 40 MG/1
40 CAPSULE, DELAYED RELEASE ORAL
Qty: 90 CAPSULE | Refills: 0 | Status: SHIPPED | OUTPATIENT
Start: 2019-01-21 | End: 2019-02-22

## 2019-01-21 NOTE — PROGRESS NOTES
Subjective:      Patient ID: Rafa Padilla is a 58 y.o. male.    Chief Complaint: Annual Exam    Hip Pain    The incident occurred more than 1 week ago. The incident occurred at the gym. There was no injury mechanism. The pain is present in the right hip. The pain is moderate. Pertinent negatives include no inability to bear weight, loss of motion, muscle weakness or numbness. He reports no foreign bodies present. The symptoms are aggravated by movement. He has tried nothing for the symptoms. The treatment provided no relief.      59 yo with   Patient Active Problem List   Diagnosis    BPH (benign prostatic hyperplasia)    Hyperlipidemia    Spinal stenosis, lumbar    DDD (degenerative disc disease), lumbar    Anxiety    Tobacco use    Prediabetes    DJD (degenerative joint disease), cervical    Myofascial pain      Past Medical History:   Diagnosis Date    Anxiety     BPH (benign prostatic hyperplasia)     Degenerative disc disease     Hyperlipidemia     Prediabetes     Spinal stenosis, lumbar     Tobacco use      Here today for annual prev exam.  Compliant with meds without significant side effects. Energy and appetite are good. Pt also c/o   Chest burning bilateral. Decreased cigarettes help. Occ wakes him from sleep. occ similar pain to epigastric area.  Rides stationary bike without chest or breathing symptoms. 30min/5 miles. Reports averaging 1/2 ppd for 44 years. Pt also c/o right hip pain.      Review of Systems   Constitutional: Negative for chills and fever.   HENT: Negative for ear pain and sore throat.    Respiratory: Negative for cough, shortness of breath and wheezing.    Cardiovascular: Negative for chest pain, palpitations and leg swelling.   Gastrointestinal: Negative for abdominal pain and blood in stool.   Genitourinary: Negative for dysuria and hematuria.   Neurological: Negative for seizures, syncope and numbness.       Objective:   /83   Pulse 73   Temp 97.9 °F (36.6  °C) (Oral)   Wt 96.3 kg (212 lb 4.9 oz)   SpO2 96%   BMI 28.79 kg/m²     Physical Exam   Constitutional: He is oriented to person, place, and time. He appears well-developed and well-nourished. No distress.   HENT:   Head: Normocephalic and atraumatic.   Mouth/Throat: Oropharynx is clear and moist.   Eyes: EOM are normal. Pupils are equal, round, and reactive to light.   Neck: Neck supple. No thyromegaly present.   Cardiovascular: Normal rate and regular rhythm.   Pulmonary/Chest: Breath sounds normal. He has no wheezes. He has no rales.   Abdominal: Soft. Bowel sounds are normal. There is no tenderness.   Musculoskeletal:        Right hip: He exhibits normal range of motion, normal strength and no tenderness.   Lymphadenopathy:     He has no cervical adenopathy.   Neurological: He is alert and oriented to person, place, and time.   Skin: Skin is warm and dry.   Psychiatric: He has a normal mood and affect. His behavior is normal.   sebaceous cyst to left posterior neck. No evidence of infection. Pt states present over one year.       Assessment:     1. Routine general medical examination at a health care facility    2. Prediabetes    3. Tobacco use    4. Benign prostatic hyperplasia without lower urinary tract symptoms    5. Colon cancer screening    6. Need for influenza vaccination    7. Gastroesophageal reflux disease, esophagitis presence not specified    8. Right hip pain    9. Sebaceous cyst    10. Erectile dysfunction, unspecified erectile dysfunction type    11. Elevated BP without diagnosis of hypertension      Plan:   Routine general medical examination at a health care facility  -     Comprehensive metabolic panel; Future; Expected date: 01/21/2019  -     CBC auto differential; Future; Expected date: 01/21/2019  -     TSH; Future; Expected date: 01/21/2019  -     Lipid panel; Future; Expected date: 01/21/2019  -     Hemoglobin A1c; Future; Expected date: 01/21/2019  -     Vitamin D; Future; Expected  date: 01/21/2019  -     PSA, Screening; Future; Expected date: 01/21/2019  Heart healthy diet and reg exercise  Hm reviewed    Prediabetes  Check a1c    Tobacco use  States not ready to quit  Benign prostatic hyperplasia without lower urinary tract symptoms  Stable    Colon cancer screening  -     Case request GI: COLONOSCOPY  -     sodium,potassium,mag sulfates (SUPREP BOWEL PREP KIT) 17.5-3.13-1.6 gram SolR; Take as directed  Dispense: 354 mL; Refill: 0    Need for influenza vaccination  -     Influenza - Quadrivalent (3 years & older) (PF)    Gastroesophageal reflux disease, esophagitis presence not specified  -     esomeprazole (NEXIUM) 40 MG capsule; Take 1 capsule (40 mg total) by mouth before breakfast.  Dispense: 90 capsule; Refill: 0    Right hip pain  -     X-Ray Hip 2 or 3 views Right; Future; Expected date: 01/21/2019  -     naproxen (NAPROSYN) 500 MG tablet; Take 1 tablet (500 mg total) by mouth 2 (two) times daily with meals. For pain and inflammation  Dispense: 30 tablet; Refill: 0    Sebaceous cyst  Comments:  left neck    Erectile dysfunction, unspecified erectile dysfunction type  Requests refill of viagra which has helped in the past.   -     sildenafil (VIAGRA) 100 MG tablet; 1/2 or 1 tab po q 24 hours prn sexual activity  Dispense: 15 tablet; Refill: 1    Elevated BP without diagnosis of hypertension  monitor      Lab Frequency Next Occurrence   Comprehensive metabolic panel Once 01/21/2019   CBC auto differential Once 01/21/2019   TSH Once 01/21/2019   Lipid panel Once 01/21/2019   Hemoglobin A1c Once 01/21/2019   Vitamin D Once 01/21/2019   PSA, Screening Once 01/21/2019       Problem List Items Addressed This Visit        Renal/    BPH (benign prostatic hyperplasia)       Endocrine    Prediabetes       Other    Tobacco use      Other Visit Diagnoses     Routine general medical examination at a health care facility    -  Primary    Relevant Orders    Comprehensive metabolic panel    CBC auto  differential    TSH    Lipid panel    Hemoglobin A1c    Vitamin D    PSA, Screening    Colon cancer screening        Relevant Medications    sodium,potassium,mag sulfates (SUPREP BOWEL PREP KIT) 17.5-3.13-1.6 gram SolR    Other Relevant Orders    Case request GI: COLONOSCOPY (Completed)    Need for influenza vaccination        Relevant Orders    Influenza - Quadrivalent (3 years & older) (PF) (Completed)    Gastroesophageal reflux disease, esophagitis presence not specified        Relevant Medications    esomeprazole (NEXIUM) 40 MG capsule    Right hip pain        Relevant Medications    naproxen (NAPROSYN) 500 MG tablet    Other Relevant Orders    X-Ray Hip 2 or 3 views Right (Completed)    Sebaceous cyst        left neck    Erectile dysfunction, unspecified erectile dysfunction type        Relevant Medications    sildenafil (VIAGRA) 100 MG tablet    Elevated BP without diagnosis of hypertension              Follow-up in about 4 weeks (around 2/18/2019), or if symptoms worsen or fail to improve.

## 2019-01-21 NOTE — PATIENT INSTRUCTIONS
Tylenol 500 to 1000 mg every 12 hours as needed for pain.         Check with your pharmacy regarding new shingles vaccine.     Tips to Control Acid Reflux    To control acid reflux, youll need to make some basic diet and lifestyle changes. The simple steps outlined below may be all youll need to ease discomfort.  Watch what you eat  · Avoid fatty foods and spicy foods.  · Eat fewer acidic foods, such as citrus and tomato-based foods. These can increase symptoms.  · Limit drinking alcohol, caffeine, and fizzy beverages. All increase acid reflux.  · Try limiting chocolate, peppermint, and spearmint. These can worsen acid reflux in some people.  Watch when you eat  · Avoid lying down for 3 hours after eating.  · Do not snack before going to bed.  Raise your head  Raising your head and upper body by 4 to 6 inches helps limit reflux when youre lying down. Put blocks under the head of your bed frame to raise it.  Other changes  · Lose weight, if you need to  · Dont exercise near bedtime  · Avoid tight-fitting clothes  · Limit aspirin and ibuprofen  · Stop smoking   Date Last Reviewed: 7/1/2016  © 1399-4191 [employer]. [address] [place] [address], [place] [address]. All rights reserved. This information is not intended as a substitute for professional medical care. Always follow your healthcare professional's instructions.        Tips to Control Acid Reflux    To control acid reflux, youll need to make some basic diet and lifestyle changes. The simple steps outlined below may be all youll need to ease discomfort.  Watch what you eat  · Avoid fatty foods and spicy foods.  · Eat fewer acidic foods, such as citrus and tomato-based foods. These can increase symptoms.  · Limit drinking alcohol, caffeine, and fizzy beverages. All increase acid reflux.  · Try limiting chocolate, peppermint, and spearmint. These can worsen acid reflux in some people.  Watch when you eat  · Avoid lying down for 3 hours after  eating.  · Do not snack before going to bed.  Raise your head  Raising your head and upper body by 4 to 6 inches helps limit reflux when youre lying down. Put blocks under the head of your bed frame to raise it.  Other changes  · Lose weight, if you need to  · Dont exercise near bedtime  · Avoid tight-fitting clothes  · Limit aspirin and ibuprofen  · Stop smoking   Date Last Reviewed: 7/1/2016  © 1589-0383 [employer]. [address] [place] [address], [place] [address]. All rights reserved. This information is not intended as a substitute for professional medical care. Always follow your healthcare professional's instructions.

## 2019-01-22 ENCOUNTER — LAB VISIT (OUTPATIENT)
Dept: LAB | Facility: HOSPITAL | Age: 59
End: 2019-01-22
Attending: INTERNAL MEDICINE
Payer: COMMERCIAL

## 2019-01-22 DIAGNOSIS — Z00.00 ROUTINE GENERAL MEDICAL EXAMINATION AT A HEALTH CARE FACILITY: ICD-10-CM

## 2019-01-22 LAB
25(OH)D3+25(OH)D2 SERPL-MCNC: 12 NG/ML
ALBUMIN SERPL BCP-MCNC: 4.1 G/DL
ALP SERPL-CCNC: 50 U/L
ALT SERPL W/O P-5'-P-CCNC: 23 U/L
ANION GAP SERPL CALC-SCNC: 6 MMOL/L
AST SERPL-CCNC: 23 U/L
BASOPHILS # BLD AUTO: 0.09 K/UL
BASOPHILS NFR BLD: 1.2 %
BILIRUB SERPL-MCNC: 0.8 MG/DL
BUN SERPL-MCNC: 16 MG/DL
CALCIUM SERPL-MCNC: 10 MG/DL
CHLORIDE SERPL-SCNC: 104 MMOL/L
CHOLEST SERPL-MCNC: 175 MG/DL
CHOLEST/HDLC SERPL: 3.2 {RATIO}
CO2 SERPL-SCNC: 30 MMOL/L
COMPLEXED PSA SERPL-MCNC: 2.9 NG/ML
CREAT SERPL-MCNC: 0.9 MG/DL
DIFFERENTIAL METHOD: NORMAL
EOSINOPHIL # BLD AUTO: 0.2 K/UL
EOSINOPHIL NFR BLD: 3 %
ERYTHROCYTE [DISTWIDTH] IN BLOOD BY AUTOMATED COUNT: 13.1 %
EST. GFR  (AFRICAN AMERICAN): >60 ML/MIN/1.73 M^2
EST. GFR  (NON AFRICAN AMERICAN): >60 ML/MIN/1.73 M^2
ESTIMATED AVG GLUCOSE: 123 MG/DL
GLUCOSE SERPL-MCNC: 104 MG/DL
HBA1C MFR BLD HPLC: 5.9 %
HCT VFR BLD AUTO: 48.4 %
HDLC SERPL-MCNC: 55 MG/DL
HDLC SERPL: 31.4 %
HGB BLD-MCNC: 15.7 G/DL
IMM GRANULOCYTES # BLD AUTO: 0.02 K/UL
IMM GRANULOCYTES NFR BLD AUTO: 0.3 %
LDLC SERPL CALC-MCNC: 108 MG/DL
LYMPHOCYTES # BLD AUTO: 2.1 K/UL
LYMPHOCYTES NFR BLD: 29.6 %
MCH RBC QN AUTO: 29.6 PG
MCHC RBC AUTO-ENTMCNC: 32.4 G/DL
MCV RBC AUTO: 91 FL
MONOCYTES # BLD AUTO: 0.8 K/UL
MONOCYTES NFR BLD: 10.8 %
NEUTROPHILS # BLD AUTO: 4 K/UL
NEUTROPHILS NFR BLD: 55.1 %
NONHDLC SERPL-MCNC: 120 MG/DL
NRBC BLD-RTO: 0 /100 WBC
PLATELET # BLD AUTO: 256 K/UL
PMV BLD AUTO: 10.5 FL
POTASSIUM SERPL-SCNC: 3.9 MMOL/L
PROT SERPL-MCNC: 7.6 G/DL
RBC # BLD AUTO: 5.3 M/UL
SODIUM SERPL-SCNC: 140 MMOL/L
TRIGL SERPL-MCNC: 60 MG/DL
TSH SERPL DL<=0.005 MIU/L-ACNC: 0.78 UIU/ML
WBC # BLD AUTO: 7.22 K/UL

## 2019-01-22 PROCEDURE — 84443 ASSAY THYROID STIM HORMONE: CPT

## 2019-01-22 PROCEDURE — 84153 ASSAY OF PSA TOTAL: CPT

## 2019-01-22 PROCEDURE — 80061 LIPID PANEL: CPT

## 2019-01-22 PROCEDURE — 85025 COMPLETE CBC W/AUTO DIFF WBC: CPT

## 2019-01-22 PROCEDURE — 83036 HEMOGLOBIN GLYCOSYLATED A1C: CPT

## 2019-01-22 PROCEDURE — 80053 COMPREHEN METABOLIC PANEL: CPT

## 2019-01-22 PROCEDURE — 36415 COLL VENOUS BLD VENIPUNCTURE: CPT

## 2019-01-22 PROCEDURE — 82306 VITAMIN D 25 HYDROXY: CPT

## 2019-01-30 ENCOUNTER — TELEPHONE (OUTPATIENT)
Dept: INTERNAL MEDICINE | Facility: CLINIC | Age: 59
End: 2019-01-30

## 2019-01-30 NOTE — TELEPHONE ENCOUNTER
Pt stated he was returning missed phone call from a week ago. Notified pt that his hip x-ray showed no concerning findings.  Pt verbalized understanding.

## 2019-01-30 NOTE — TELEPHONE ENCOUNTER
----- Message from Rashi Higuera sent at 1/30/2019  3:11 PM CST -----  Contact: self [phone]  Returning call, please call back at [phone]

## 2019-02-13 ENCOUNTER — PATIENT OUTREACH (OUTPATIENT)
Dept: ADMINISTRATIVE | Facility: HOSPITAL | Age: 59
End: 2019-02-13

## 2019-02-18 ENCOUNTER — TELEPHONE (OUTPATIENT)
Dept: INTERNAL MEDICINE | Facility: CLINIC | Age: 59
End: 2019-02-18

## 2019-02-20 ENCOUNTER — TELEPHONE (OUTPATIENT)
Dept: INTERNAL MEDICINE | Facility: CLINIC | Age: 59
End: 2019-02-20

## 2019-02-20 NOTE — TELEPHONE ENCOUNTER
Pt wife asked if the pt needed to fast before his appt on 2/22/19. I informed her that he did not need to fast at all for the appt because it is just a f/u. Pt wife verbalized understanding and stated that she would relay the message to her .

## 2019-02-20 NOTE — TELEPHONE ENCOUNTER
----- Message from Jasmine Favorite sent at 2/20/2019  4:06 PM CST -----  Contact: Pt   Pt called and stated he needs to know if he needs to r/s his a ppt on 2/22/19 and wait until he has a colonoscopy. She can be reached at [phone].    Thanks,  TF

## 2019-02-22 ENCOUNTER — OFFICE VISIT (OUTPATIENT)
Dept: INTERNAL MEDICINE | Facility: CLINIC | Age: 59
End: 2019-02-22
Payer: COMMERCIAL

## 2019-02-22 VITALS
HEART RATE: 64 BPM | WEIGHT: 214.5 LBS | DIASTOLIC BLOOD PRESSURE: 82 MMHG | TEMPERATURE: 98 F | BODY MASS INDEX: 29.09 KG/M2 | SYSTOLIC BLOOD PRESSURE: 142 MMHG | OXYGEN SATURATION: 97 %

## 2019-02-22 DIAGNOSIS — Z91.89 AT RISK FOR CORONARY ARTERY DISEASE: ICD-10-CM

## 2019-02-22 DIAGNOSIS — R73.03 PREDIABETES: ICD-10-CM

## 2019-02-22 DIAGNOSIS — R03.0 ELEVATED BP WITHOUT DIAGNOSIS OF HYPERTENSION: Primary | ICD-10-CM

## 2019-02-22 PROCEDURE — 99214 PR OFFICE/OUTPT VISIT, EST, LEVL IV, 30-39 MIN: ICD-10-PCS | Mod: S$GLB,,, | Performed by: INTERNAL MEDICINE

## 2019-02-22 PROCEDURE — 99214 OFFICE O/P EST MOD 30 MIN: CPT | Mod: S$GLB,,, | Performed by: INTERNAL MEDICINE

## 2019-02-22 PROCEDURE — 99999 PR PBB SHADOW E&M-EST. PATIENT-LVL III: ICD-10-PCS | Mod: PBBFAC,,, | Performed by: INTERNAL MEDICINE

## 2019-02-22 PROCEDURE — 99999 PR PBB SHADOW E&M-EST. PATIENT-LVL III: CPT | Mod: PBBFAC,,, | Performed by: INTERNAL MEDICINE

## 2019-02-22 NOTE — PATIENT INSTRUCTIONS
Vitamin D3 1000 IU daily.     Prediabetes  You have been diagnosed with prediabetes. This means that the level of sugar (glucose) in your blood is too high. If you have prediabetes, you are at risk for developing type 2 diabetes. Type 2 diabetes is diagnosed when the level of glucose in the blood reaches a certain high level. With prediabetes, it hasnt reached this point yet, but it is higher than normal. It is vital to make lifestyle changes to lower your blood sugar, improve your health, and prevent diabetes. This sheet will tell you more.      Why worry about prediabetes?  Prediabetes is a disease where the bodys cells have trouble using glucose in the blood for energy. As a result, too much glucose stays in the blood and can affect how your heart and blood vessels work. Without changes in diet and lifestyle, the problem can get worse. Once you have type 2 diabetes, it is chronic (ongoing) and needs to be managed for the rest of your life. Diabetes can harm the body and your health by damaging organs, such as your eyes and kidneys. It makes you more likely to have heart disease. And it can damage nerves and blood vessels.  Who is a risk for prediabetes?  The exact cause of prediabetes is not clear. But certain risk factors make a person more likely to have it. These include:  · A family history of type 2 diabetes  · Being overweight  · Being over age 45  · Have hypertension or elevated cholesterol   · Having had gestational diabetes  · Not being physically active  · Being ,  American, , , , or   Diagnosing prediabetes  Prediabetes may have no symptoms or you may have some of the symptoms of diabetes. The diagnosis is made with a blood test. You may have one or more of these blood tests:   · Fasting glucose test. Blood is taken and tested after you have fasted (not eaten) for at least 8 hours. A normal test result is 99 milligrams per  deciliter (mg/dL) or lower. Prediabetes is 100 mg/dL to 125 mg/dL. Diabetes is 126 mg/dL or higher.  · Glucose tolerance test. Your blood sugar is measured before and after you drink a very sugary liquid. A normal test result is 139 milligrams per deciliter (mg/dL) or lower. Prediabetes is 140 mg/dL to 199 mg/dL. Diabetes is 200 mg/dL or higher.  · Hemoglobin A1c (HbA1c). Your HbA1c is normal if it is below 5.7%. Prediabetes is 5.7% to 6.4%. Diabetes is 6.5% or higher.   Treating prediabetes  The best way to treat prediabetes is to lose at least 5% to 7% of your current weight and be more physically active by getting at least 150 minutes a week of physical activity. When sitting for long periods of time, get up for short sessions of light activity every 30 minutes. These changes help the bodys cells use blood sugar better. Even a small amount of weight loss can help. Work with your healthcare provider to make a plan to eat well and be more active. Keep in mind that small changes can add up. Other changes in your lifestyle (or even taking certain medicines, such as metformin) may make you less likely to develop diabetes. Your healthcare provider can talk with you about these.  Follow-up  If it is untreated, prediabetes can turn into diabetes. This is a serious health condition. Take steps to stop this from happening. Follow the treatment plan you have been given. You may have your blood glucose tested again in about 12 to 18 months.  Symptoms of diabetes  Let your healthcare provider know if you have any of the following:  · Always feel very tired  · Feel very thirsty or hungry much of the time  · Have to urinate often  · Lose weight for no reason  · Feel numbness or tingling in your fingers or toes  · Have cuts or bruises that dont seem to heal  · Have blurry vision   Date Last Reviewed: 5/1/2016  © 7241-8039 [employer]. [address] [place] [address], [place] [address]. All rights reserved. This  information is not intended as a substitute for professional medical care. Always follow your healthcare professional's instructions.

## 2019-02-25 NOTE — PROGRESS NOTES
Subjective:      Patient ID: Rafa Padilla is a 58 y.o. male.    Chief Complaint: Follow-up    HPI   59 yo with   Patient Active Problem List   Diagnosis    BPH (benign prostatic hyperplasia)    Hyperlipidemia    Spinal stenosis, lumbar    DDD (degenerative disc disease), lumbar    Anxiety    Tobacco use    Prediabetes    DJD (degenerative joint disease), cervical    Myofascial pain     Past Medical History:   Diagnosis Date    Anxiety     BPH (benign prostatic hyperplasia)     Degenerative disc disease     Hyperlipidemia     Prediabetes     Spinal stenosis, lumbar     Tobacco use      Here today for management of mult medical problems as outlined below and follow up on lab results.  Feeling well overall. Patient reports that he does not want to start any blood pressure medications.  Would like to try diet and exercise and reassess  Review of Systems  Objective:   BP (!) 142/82 (BP Location: Right arm, Patient Position: Sitting, BP Method: Large (Manual))   Pulse 64   Temp 97.8 °F (36.6 °C) (Tympanic)   Wt 97.3 kg (214 lb 8.1 oz)   SpO2 97%   BMI 29.09 kg/m²     Physical Exam  No visits with results within 2 Week(s) from this visit.   Latest known visit with results is:   Lab Visit on 01/22/2019   Component Date Value Ref Range Status    Sodium 01/22/2019 140  136 - 145 mmol/L Final    Potassium 01/22/2019 3.9  3.5 - 5.1 mmol/L Final    Chloride 01/22/2019 104  95 - 110 mmol/L Final    CO2 01/22/2019 30* 23 - 29 mmol/L Final    Glucose 01/22/2019 104  70 - 110 mg/dL Final    BUN, Bld 01/22/2019 16  6 - 20 mg/dL Final    Creatinine 01/22/2019 0.9  0.5 - 1.4 mg/dL Final    Calcium 01/22/2019 10.0  8.7 - 10.5 mg/dL Final    Total Protein 01/22/2019 7.6  6.0 - 8.4 g/dL Final    Albumin 01/22/2019 4.1  3.5 - 5.2 g/dL Final    Total Bilirubin 01/22/2019 0.8  0.1 - 1.0 mg/dL Final    Comment: For infants and newborns, interpretation of results should be based  on gestational age, weight  and in agreement with clinical  observations.  Premature Infant recommended reference ranges:  Up to 24 hours.............<8.0 mg/dL  Up to 48 hours............<12.0 mg/dL  3-5 days..................<15.0 mg/dL  6-29 days.................<15.0 mg/dL      Alkaline Phosphatase 01/22/2019 50* 55 - 135 U/L Final    AST 01/22/2019 23  10 - 40 U/L Final    ALT 01/22/2019 23  10 - 44 U/L Final    Anion Gap 01/22/2019 6* 8 - 16 mmol/L Final    eGFR if African American 01/22/2019 >60.0  >60 mL/min/1.73 m^2 Final    eGFR if non African American 01/22/2019 >60.0  >60 mL/min/1.73 m^2 Final    Comment: Calculation used to obtain the estimated glomerular filtration  rate (eGFR) is the CKD-EPI equation.       WBC 01/22/2019 7.22  3.90 - 12.70 K/uL Final    RBC 01/22/2019 5.30  4.60 - 6.20 M/uL Final    Hemoglobin 01/22/2019 15.7  14.0 - 18.0 g/dL Final    Hematocrit 01/22/2019 48.4  40.0 - 54.0 % Final    MCV 01/22/2019 91  82 - 98 fL Final    MCH 01/22/2019 29.6  27.0 - 31.0 pg Final    MCHC 01/22/2019 32.4  32.0 - 36.0 g/dL Final    RDW 01/22/2019 13.1  11.5 - 14.5 % Final    Platelets 01/22/2019 256  150 - 350 K/uL Final    MPV 01/22/2019 10.5  9.2 - 12.9 fL Final    Immature Granulocytes 01/22/2019 0.3  0.0 - 0.5 % Final    Gran # (ANC) 01/22/2019 4.0  1.8 - 7.7 K/uL Final    Immature Grans (Abs) 01/22/2019 0.02  0.00 - 0.04 K/uL Final    Comment: Mild elevation in immature granulocytes is non specific and   can be seen in a variety of conditions including stress response,   acute inflammation, trauma and pregnancy. Correlation with other   laboratory and clinical findings is essential.      Lymph # 01/22/2019 2.1  1.0 - 4.8 K/uL Final    Mono # 01/22/2019 0.8  0.3 - 1.0 K/uL Final    Eos # 01/22/2019 0.2  0.0 - 0.5 K/uL Final    Baso # 01/22/2019 0.09  0.00 - 0.20 K/uL Final    nRBC 01/22/2019 0  0 /100 WBC Final    Gran% 01/22/2019 55.1  38.0 - 73.0 % Final    Lymph% 01/22/2019 29.6  18.0 - 48.0 %  Final    Mono% 01/22/2019 10.8  4.0 - 15.0 % Final    Eosinophil% 01/22/2019 3.0  0.0 - 8.0 % Final    Basophil% 01/22/2019 1.2  0.0 - 1.9 % Final    Differential Method 01/22/2019 Automated   Final    TSH 01/22/2019 0.776  0.400 - 4.000 uIU/mL Final    Cholesterol 01/22/2019 175  120 - 199 mg/dL Final    Comment: The National Cholesterol Education Program (NCEP) has set the  following guidelines (reference ranges) for Cholesterol:  Optimal.....................<200 mg/dL  Borderline High.............200-239 mg/dL  High........................> or = 240 mg/dL      Triglycerides 01/22/2019 60  30 - 150 mg/dL Final    Comment: The National Cholesterol Education Program (NCEP) has set the  following guidelines (reference values) for triglycerides:  Normal......................<150 mg/dL  Borderline High.............150-199 mg/dL  High........................200-499 mg/dL      HDL 01/22/2019 55  40 - 75 mg/dL Final    Comment: The National Cholesterol Education Program (NCEP) has set the  following guidelines (reference values) for HDL Cholesterol:  Low...............<40 mg/dL  Optimal...........>60 mg/dL      LDL Cholesterol 01/22/2019 108.0  63.0 - 159.0 mg/dL Final    Comment: The National Cholesterol Education Program (NCEP) has set the  following guidelines (reference values) for LDL Cholesterol:  Optimal.......................<130 mg/dL  Borderline High...............130-159 mg/dL  High..........................160-189 mg/dL  Very High.....................>190 mg/dL      HDL/Chol Ratio 01/22/2019 31.4  20.0 - 50.0 % Final    Total Cholesterol/HDL Ratio 01/22/2019 3.2  2.0 - 5.0 Final    Non-HDL Cholesterol 01/22/2019 120  mg/dL Final    Comment: Risk category and Non-HDL cholesterol goals:  Coronary heart disease (CHD)or equivalent (10-year risk of CHD >20%):  Non-HDL cholesterol goal     <130 mg/dL  Two or more CHD risk factors and 10-year risk of CHD <= 20%:  Non-HDL cholesterol goal     <160 mg/dL  0  to 1 CHD risk factor:  Non-HDL cholesterol goal     <190 mg/dL      Hemoglobin A1C 01/22/2019 5.9* 4.0 - 5.6 % Final    Comment: ADA Screening Guidelines:  5.7-6.4%  Consistent with prediabetes  >or=6.5%  Consistent with diabetes  High levels of fetal hemoglobin interfere with the HbA1C  assay. Heterozygous hemoglobin variants (HbS, HgC, etc)do  not significantly interfere with this assay.   However, presence of multiple variants may affect accuracy.      Estimated Avg Glucose 01/22/2019 123  68 - 131 mg/dL Final    Vit D, 25-Hydroxy 01/22/2019 12* 30 - 96 ng/mL Final    Comment: Vitamin D deficiency.........<10 ng/mL                              Vitamin D insufficiency......10-29 ng/mL       Vitamin D sufficiency........> or equal to 30 ng/mL  Vitamin D toxicity............>100 ng/mL      PSA, SCREEN 01/22/2019 2.9  0.00 - 4.00 ng/mL Final    Comment: PSA Expected levels:  Hormonal Therapy: <0.05 ng/ml  Prostatectomy: <0.01 ng/ml  Radiation Therapy: <1.00 ng/ml         Assessment:     1. Elevated BP without diagnosis of hypertension    2. At risk for coronary artery disease    3. Prediabetes      Plan:   Elevated BP without diagnosis of hypertension  Pt requests monitor    At risk for coronary artery disease  Discussed r and b of statins. Pt will consider  -     CT Cardiac Scoring; Future; Expected date: 02/22/2019  Recommendations will be provided to pt after coronary artery calcium score.     Prediabetes    stable    Lab Frequency Next Occurrence   CT Cardiac Scoring Once 02/22/2019       Problem List Items Addressed This Visit        Endocrine    Prediabetes      Other Visit Diagnoses     Elevated BP without diagnosis of hypertension    -  Primary    At risk for coronary artery disease        Relevant Orders    CT Cardiac Scoring          Follow-up in about 3 months (around 5/22/2019), or if symptoms worsen or fail to improve.

## 2019-03-01 ENCOUNTER — TELEPHONE (OUTPATIENT)
Dept: RADIOLOGY | Facility: HOSPITAL | Age: 59
End: 2019-03-01

## 2019-03-04 ENCOUNTER — HOSPITAL ENCOUNTER (OUTPATIENT)
Dept: RADIOLOGY | Facility: HOSPITAL | Age: 59
Discharge: HOME | End: 2019-03-04
Attending: INTERNAL MEDICINE

## 2019-03-04 DIAGNOSIS — Z91.89 AT RISK FOR CORONARY ARTERY DISEASE: ICD-10-CM

## 2019-03-04 PROCEDURE — 75571 CT HRT W/O DYE W/CA TEST: CPT | Mod: 26,,, | Performed by: RADIOLOGY

## 2019-03-04 PROCEDURE — 75571 CT CALCIUM SCORING CARDIAC: ICD-10-PCS | Mod: 26,,, | Performed by: RADIOLOGY

## 2019-03-04 PROCEDURE — 75571 CT HRT W/O DYE W/CA TEST: CPT | Mod: TC

## 2019-03-05 ENCOUNTER — TELEPHONE (OUTPATIENT)
Dept: INTERNAL MEDICINE | Facility: CLINIC | Age: 59
End: 2019-03-05

## 2019-03-05 DIAGNOSIS — Z12.9 SCREENING FOR CANCER: ICD-10-CM

## 2019-03-05 DIAGNOSIS — Z91.89 AT RISK FOR CORONARY ARTERY DISEASE: ICD-10-CM

## 2019-03-05 DIAGNOSIS — F17.200 SMOKER: Primary | ICD-10-CM

## 2019-03-05 NOTE — TELEPHONE ENCOUNTER
Coronary calcium scan reveals some plaquing. Although score indicates low to moderate risk I think statin medication is a good idea to prevent further plaquing and cardiac events. Please let me know if pt would like me to send a statin medication for him.

## 2019-03-05 NOTE — TELEPHONE ENCOUNTER
Pt says he wants to think about the statin medication before making a decision. Pt also wants to get scanned for lung cancer.

## 2019-03-16 DIAGNOSIS — N40.0 BENIGN PROSTATIC HYPERPLASIA WITHOUT LOWER URINARY TRACT SYMPTOMS: ICD-10-CM

## 2019-03-17 RX ORDER — TAMSULOSIN HYDROCHLORIDE 0.4 MG/1
CAPSULE ORAL
Qty: 180 CAPSULE | Refills: 0 | Status: SHIPPED | OUTPATIENT
Start: 2019-03-17 | End: 2019-06-16 | Stop reason: SDUPTHER

## 2019-03-25 NOTE — TELEPHONE ENCOUNTER
Called pt to ask if he had made a decision on whether or not he was willing to try a statin medication to prevent further plaquing.  Pt stated he will try stating med if an order for a lung cancer screening test will be placed for him.  Stated he is willing to pay out of pocket for test.  Notified pt that I will send message to doctor for approval and will contact him to let him know which med was prescribed and if test order was placed.  Pt verbalized understanding.

## 2019-03-29 RX ORDER — ATORVASTATIN CALCIUM 20 MG/1
20 TABLET, FILM COATED ORAL DAILY
Qty: 90 TABLET | Refills: 1 | Status: SHIPPED | OUTPATIENT
Start: 2019-03-29 | End: 2019-11-12 | Stop reason: SDUPTHER

## 2019-03-29 NOTE — TELEPHONE ENCOUNTER
Notified pt that atorvastatin script was sent to [facility], labs have been ordered for 3 months, and lung cancer CT scan was ordered.  Pt verbalized understanding and stated he will call back to schedule CT scan.

## 2019-05-23 ENCOUNTER — TELEPHONE (OUTPATIENT)
Dept: INTERNAL MEDICINE | Facility: CLINIC | Age: 59
End: 2019-05-23

## 2019-05-23 NOTE — TELEPHONE ENCOUNTER
Message left for pt to return call to clinic re: there is an order in place for a CT low dose lung cancer screening test.  It just needs to be scheduled.

## 2019-05-23 NOTE — TELEPHONE ENCOUNTER
----- Message from Selena Dillard sent at 5/23/2019 11:44 AM CDT -----  Contact: pt  The pt request a call to schedule a lung scan appt, no orders in the system and can be reached at [phone]///[id number]

## 2019-05-24 ENCOUNTER — OFFICE VISIT (OUTPATIENT)
Dept: INTERNAL MEDICINE | Facility: CLINIC | Age: 59
End: 2019-05-24
Payer: COMMERCIAL

## 2019-05-24 VITALS
WEIGHT: 210.13 LBS | BODY MASS INDEX: 28.49 KG/M2 | OXYGEN SATURATION: 98 % | SYSTOLIC BLOOD PRESSURE: 122 MMHG | HEART RATE: 71 BPM | DIASTOLIC BLOOD PRESSURE: 78 MMHG | TEMPERATURE: 98 F

## 2019-05-24 DIAGNOSIS — Z72.0 TOBACCO USE: ICD-10-CM

## 2019-05-24 DIAGNOSIS — R73.03 PREDIABETES: Primary | ICD-10-CM

## 2019-05-24 DIAGNOSIS — Z12.11 COLON CANCER SCREENING: ICD-10-CM

## 2019-05-24 DIAGNOSIS — E78.5 HYPERLIPIDEMIA, UNSPECIFIED HYPERLIPIDEMIA TYPE: ICD-10-CM

## 2019-05-24 PROCEDURE — 99999 PR PBB SHADOW E&M-EST. PATIENT-LVL III: ICD-10-PCS | Mod: PBBFAC,,, | Performed by: INTERNAL MEDICINE

## 2019-05-24 PROCEDURE — 99213 OFFICE O/P EST LOW 20 MIN: CPT | Mod: S$GLB,,, | Performed by: INTERNAL MEDICINE

## 2019-05-24 PROCEDURE — 99999 PR PBB SHADOW E&M-EST. PATIENT-LVL III: CPT | Mod: PBBFAC,,, | Performed by: INTERNAL MEDICINE

## 2019-05-24 PROCEDURE — 99213 PR OFFICE/OUTPT VISIT, EST, LEVL III, 20-29 MIN: ICD-10-PCS | Mod: S$GLB,,, | Performed by: INTERNAL MEDICINE

## 2019-05-27 ENCOUNTER — TELEPHONE (OUTPATIENT)
Dept: ENDOSCOPY | Facility: HOSPITAL | Age: 59
End: 2019-05-27

## 2019-05-27 NOTE — TELEPHONE ENCOUNTER
Spoke with patient in efforts to scheduling endoscopy procedure. Patient declines to schedule at this time. He states he will call back after arranging transportation, call back number provided.

## 2019-05-29 ENCOUNTER — TELEPHONE (OUTPATIENT)
Dept: INTERNAL MEDICINE | Facility: CLINIC | Age: 59
End: 2019-05-29

## 2019-05-29 NOTE — TELEPHONE ENCOUNTER
----- Message from RT Desirae sent at 5/29/2019 11:23 AM CDT -----  Contact: Radiology  Hey, yes it has changed it goes through insurance for approval now.    ----- Message -----  From: Eun Good LPN  Sent: 5/29/2019  10:45 AM  To: Jeniffer Vizcaino RT    Jeniffer,  I thought the low dose lung cancer screening CT scans don't go thru insurance.  I thought the patient had to pay a $99 out-of-pocket fee for the test.  At least that was the last instruction I had from a couple years ago.  Has that changed?  Thanks,  Eun BAPTISTE LPN  ----- Message -----  From: RT Desirae  Sent: 5/29/2019   9:48 AM  To: Enrico Olmedo    Good Morning, patient has a CT Scan scheduled for tomorrow, and we need to allow time for insurance approval, is the test medically urgent? if not medically urgent we may need to re-schedule to allow Insurance time to approve test. If you have any questions call ext [phone].  Thanks  Jeniffer Radiology    IF TEST IS NOT MEDICALLY URGENT, PLEASE RE-SCHEDULE PATIENT AT LEAST 3-4 DAYS OUT TO ALLOW TIME FOR INSURANCE TO PROCESS APPROVAL

## 2019-05-29 NOTE — TELEPHONE ENCOUNTER
----- Message from Jeniffer Vizcaino RT sent at 5/29/2019  9:48 AM CDT -----  Contact: Radiology  Good Morning, patient has a CT Scan scheduled for tomorrow, and we need to allow time for insurance approval, is the test medically urgent? if not medically urgent we may need to re-schedule to allow Insurance time to approve test. If you have any questions call ext [phone].  Thanks  Jeniffer Radiology    IF TEST IS NOT MEDICALLY URGENT, PLEASE RE-SCHEDULE PATIENT AT LEAST 3-4 DAYS OUT TO ALLOW TIME FOR INSURANCE TO PROCESS APPROVAL

## 2019-05-29 NOTE — TELEPHONE ENCOUNTER
Received fax from [employer] stating authorization/pre-cert is not required for CT scan.  Fax was sent to Oreyell Powders in the pre-service dept.

## 2019-05-30 ENCOUNTER — HOSPITAL ENCOUNTER (OUTPATIENT)
Dept: RADIOLOGY | Facility: HOSPITAL | Age: 59
Discharge: HOME | End: 2019-05-30
Attending: INTERNAL MEDICINE
Payer: COMMERCIAL

## 2019-05-30 ENCOUNTER — TELEPHONE (OUTPATIENT)
Dept: INTERNAL MEDICINE | Facility: CLINIC | Age: 59
End: 2019-05-30

## 2019-05-30 DIAGNOSIS — F17.200 SMOKER: ICD-10-CM

## 2019-05-30 DIAGNOSIS — Z12.9 SCREENING FOR CANCER: ICD-10-CM

## 2019-05-30 PROCEDURE — G0297 CT CHEST LUNG SCREENING LOW DOSE: ICD-10-PCS | Mod: 26,,, | Performed by: RADIOLOGY

## 2019-05-30 PROCEDURE — G0297 LDCT FOR LUNG CA SCREEN: HCPCS | Mod: 26,,, | Performed by: RADIOLOGY

## 2019-05-30 PROCEDURE — G0297 LDCT FOR LUNG CA SCREEN: HCPCS | Mod: TC

## 2019-06-15 DIAGNOSIS — N40.0 BENIGN PROSTATIC HYPERPLASIA WITHOUT LOWER URINARY TRACT SYMPTOMS: ICD-10-CM

## 2019-06-16 RX ORDER — TAMSULOSIN HYDROCHLORIDE 0.4 MG/1
CAPSULE ORAL
Qty: 180 CAPSULE | Refills: 0 | Status: SHIPPED | OUTPATIENT
Start: 2019-06-16 | End: 2019-10-13 | Stop reason: SDUPTHER

## 2019-06-28 ENCOUNTER — LAB VISIT (OUTPATIENT)
Dept: LAB | Facility: HOSPITAL | Age: 59
End: 2019-06-28
Attending: INTERNAL MEDICINE
Payer: COMMERCIAL

## 2019-06-28 DIAGNOSIS — R73.03 PREDIABETES: ICD-10-CM

## 2019-06-28 DIAGNOSIS — Z91.89 AT RISK FOR CORONARY ARTERY DISEASE: ICD-10-CM

## 2019-06-28 LAB
ALT SERPL W/O P-5'-P-CCNC: 33 U/L (ref 10–44)
CHOLEST SERPL-MCNC: 126 MG/DL (ref 120–199)
CHOLEST/HDLC SERPL: 2.4 {RATIO} (ref 2–5)
ESTIMATED AVG GLUCOSE: 126 MG/DL (ref 68–131)
HBA1C MFR BLD HPLC: 6 % (ref 4–5.6)
HDLC SERPL-MCNC: 53 MG/DL (ref 40–75)
HDLC SERPL: 42.1 % (ref 20–50)
LDLC SERPL CALC-MCNC: 61 MG/DL (ref 63–159)
NONHDLC SERPL-MCNC: 73 MG/DL
TRIGL SERPL-MCNC: 60 MG/DL (ref 30–150)

## 2019-06-28 PROCEDURE — 36415 COLL VENOUS BLD VENIPUNCTURE: CPT

## 2019-06-28 PROCEDURE — 80061 LIPID PANEL: CPT

## 2019-06-28 PROCEDURE — 83036 HEMOGLOBIN GLYCOSYLATED A1C: CPT

## 2019-06-28 PROCEDURE — 84460 ALANINE AMINO (ALT) (SGPT): CPT

## 2019-07-08 ENCOUNTER — PATIENT OUTREACH (OUTPATIENT)
Dept: ADMINISTRATIVE | Facility: HOSPITAL | Age: 59
End: 2019-07-08

## 2019-07-15 ENCOUNTER — TELEPHONE (OUTPATIENT)
Dept: INTERNAL MEDICINE | Facility: CLINIC | Age: 59
End: 2019-07-15

## 2019-07-15 NOTE — TELEPHONE ENCOUNTER
Pt stated he was actually trying to contact the colonoscopy dept to schedule his procedure.  Gave pt phone number of [phone].  Advised pt that he was scheduled to see Dr. Weston today at 1 pm but didn't come to appt and appt can be rescheduled.  Pt stated he will reschedule his appt with Dr. Weston after he has his colonoscopy.

## 2019-07-15 NOTE — TELEPHONE ENCOUNTER
----- Message from Merly Mendoza sent at 7/15/2019  1:36 PM CDT -----  Contact: Pt  Pt is calling in regards to colonoscopy. Pt is requesting call back from nurse.      Pls call pt back at [phone]

## 2019-10-13 DIAGNOSIS — N40.0 BENIGN PROSTATIC HYPERPLASIA WITHOUT LOWER URINARY TRACT SYMPTOMS: ICD-10-CM

## 2019-10-13 RX ORDER — TAMSULOSIN HYDROCHLORIDE 0.4 MG/1
CAPSULE ORAL
Qty: 180 CAPSULE | Refills: 0 | Status: SHIPPED | OUTPATIENT
Start: 2019-10-13 | End: 2020-01-11

## 2019-11-12 DIAGNOSIS — Z91.89 AT RISK FOR CORONARY ARTERY DISEASE: ICD-10-CM

## 2019-11-12 RX ORDER — ATORVASTATIN CALCIUM 20 MG/1
20 TABLET, FILM COATED ORAL DAILY
Qty: 30 TABLET | Refills: 0 | Status: SHIPPED | OUTPATIENT
Start: 2019-11-12 | End: 2019-12-13 | Stop reason: SDUPTHER

## 2019-12-03 ENCOUNTER — TELEPHONE (OUTPATIENT)
Dept: ADMINISTRATIVE | Facility: HOSPITAL | Age: 59
End: 2019-12-03

## 2019-12-03 ENCOUNTER — OFFICE VISIT (OUTPATIENT)
Dept: INTERNAL MEDICINE | Facility: CLINIC | Age: 59
End: 2019-12-03
Payer: COMMERCIAL

## 2019-12-03 VITALS
SYSTOLIC BLOOD PRESSURE: 140 MMHG | BODY MASS INDEX: 29.51 KG/M2 | TEMPERATURE: 97 F | WEIGHT: 217.63 LBS | OXYGEN SATURATION: 97 % | HEART RATE: 65 BPM | DIASTOLIC BLOOD PRESSURE: 90 MMHG

## 2019-12-03 DIAGNOSIS — Z23 NEED FOR INFLUENZA VACCINATION: ICD-10-CM

## 2019-12-03 DIAGNOSIS — L72.3 INFECTED SEBACEOUS CYST: Primary | ICD-10-CM

## 2019-12-03 DIAGNOSIS — Z23 NEED FOR PNEUMOCOCCAL VACCINATION: ICD-10-CM

## 2019-12-03 DIAGNOSIS — Z12.11 COLON CANCER SCREENING: ICD-10-CM

## 2019-12-03 DIAGNOSIS — L08.9 INFECTED SEBACEOUS CYST: Primary | ICD-10-CM

## 2019-12-03 PROCEDURE — 99214 PR OFFICE/OUTPT VISIT, EST, LEVL IV, 30-39 MIN: ICD-10-PCS | Mod: 25,S$GLB,, | Performed by: INTERNAL MEDICINE

## 2019-12-03 PROCEDURE — 90472 PNEUMOCOCCAL POLYSACCHARIDE VACCINE 23-VALENT =>2YO SQ IM: ICD-10-PCS | Mod: S$GLB,,, | Performed by: INTERNAL MEDICINE

## 2019-12-03 PROCEDURE — 90471 FLU VACCINE (QUAD) GREATER THAN OR EQUAL TO 3YO PRESERVATIVE FREE IM: ICD-10-PCS | Mod: S$GLB,,, | Performed by: INTERNAL MEDICINE

## 2019-12-03 PROCEDURE — 90686 FLU VACCINE (QUAD) GREATER THAN OR EQUAL TO 3YO PRESERVATIVE FREE IM: ICD-10-PCS | Mod: S$GLB,,, | Performed by: INTERNAL MEDICINE

## 2019-12-03 PROCEDURE — 90686 IIV4 VACC NO PRSV 0.5 ML IM: CPT | Mod: S$GLB,,, | Performed by: INTERNAL MEDICINE

## 2019-12-03 PROCEDURE — 99214 OFFICE O/P EST MOD 30 MIN: CPT | Mod: 25,S$GLB,, | Performed by: INTERNAL MEDICINE

## 2019-12-03 PROCEDURE — 90472 IMMUNIZATION ADMIN EACH ADD: CPT | Mod: S$GLB,,, | Performed by: INTERNAL MEDICINE

## 2019-12-03 PROCEDURE — 99999 PR PBB SHADOW E&M-EST. PATIENT-LVL IV: CPT | Mod: PBBFAC,,, | Performed by: INTERNAL MEDICINE

## 2019-12-03 PROCEDURE — 90471 IMMUNIZATION ADMIN: CPT | Mod: S$GLB,,, | Performed by: INTERNAL MEDICINE

## 2019-12-03 PROCEDURE — 90732 PNEUMOCOCCAL POLYSACCHARIDE VACCINE 23-VALENT =>2YO SQ IM: ICD-10-PCS | Mod: S$GLB,,, | Performed by: INTERNAL MEDICINE

## 2019-12-03 PROCEDURE — 99999 PR PBB SHADOW E&M-EST. PATIENT-LVL IV: ICD-10-PCS | Mod: PBBFAC,,, | Performed by: INTERNAL MEDICINE

## 2019-12-03 PROCEDURE — 90732 PPSV23 VACC 2 YRS+ SUBQ/IM: CPT | Mod: S$GLB,,, | Performed by: INTERNAL MEDICINE

## 2019-12-03 RX ORDER — SULFAMETHOXAZOLE AND TRIMETHOPRIM 800; 160 MG/1; MG/1
1 TABLET ORAL 2 TIMES DAILY
Qty: 20 TABLET | Refills: 0 | Status: SHIPPED | OUTPATIENT
Start: 2019-12-03 | End: 2019-12-13

## 2019-12-03 NOTE — TELEPHONE ENCOUNTER
Patient states that he would like to be contacted to schedule his colonoscopy at a later date. 12/03/2019 Car[place]

## 2019-12-03 NOTE — PROGRESS NOTES
Subjective:      Patient ID: Rafa Padilla is a 59 y.o. male.    Chief Complaint: Boil    HPI   60 yo with   Patient Active Problem List   Diagnosis    BPH (benign prostatic hyperplasia)    Hyperlipidemia    Spinal stenosis, lumbar    DDD (degenerative disc disease), lumbar    Anxiety    Tobacco use    Prediabetes    DJD (degenerative joint disease), cervical    Myofascial pain     Past Medical History:   Diagnosis Date    Anxiety     BPH (benign prostatic hyperplasia)     Degenerative disc disease     Hyperlipidemia     Prediabetes     Spinal stenosis, lumbar     Tobacco use      Here today complaining of enlarging bump to his posterior upper neck.  Swollen x 2 months.  Has become more tender over the last 2 weeks.  Pain is moderate.  He has tried no treatment.  There has been no improvement.  Symptoms are gradually worsening.  Symptoms are worse with palpation.  No relieving factors.  Review of Systems   Constitutional: Negative for chills, fatigue and fever.   HENT: Negative for ear pain and sore throat.    Respiratory: Negative for cough, shortness of breath and wheezing.    Cardiovascular: Negative for chest pain, palpitations and leg swelling.   Gastrointestinal: Negative for abdominal pain and blood in stool.   Genitourinary: Negative for dysuria and hematuria.   Musculoskeletal: Negative for back pain and neck stiffness.   Neurological: Negative for dizziness, seizures, syncope, weakness, light-headedness and headaches.     Objective:   BP (!) 140/90 (BP Location: Left arm, Patient Position: Sitting, BP Method: Large (Manual))   Pulse 65   Temp 97.4 °F (36.3 °C) (Tympanic)   Wt 98.7 kg (217 lb 9.5 oz)   SpO2 97%   BMI 29.51 kg/m²     Physical Exam   Constitutional: He appears well-developed and well-nourished. No distress.   HENT:   Head: Normocephalic and atraumatic.   Eyes: Conjunctivae and EOM are normal.   Neck: Normal range of motion.       Cardiovascular: Normal rate and regular  rhythm.   Pulmonary/Chest: Effort normal and breath sounds normal.   Musculoskeletal: He exhibits no edema.   Skin: Skin is warm and dry.   Psychiatric: He has a normal mood and affect. His behavior is normal.       Assessment:     1. Infected sebaceous cyst    2. Need for influenza vaccination    3. Need for pneumococcal vaccination    4. Colon cancer screening      Plan:   Infected sebaceous cyst  -     sulfamethoxazole-trimethoprim 800-160mg (BACTRIM DS) 800-160 mg Tab; Take 1 tablet by mouth 2 (two) times daily. for 10 days  Dispense: 20 tablet; Refill: 0  -     Ambulatory Referral to General Surgery    Need for influenza vaccination    Need for pneumococcal vaccination  -     (In Office Administered) Pneumococcal Polysaccharide Vaccine (23 Valent) (SQ/IM)    Colon cancer screening  -     Case request GI: COLONOSCOPY    Other orders  -     Influenza - Quadrivalent (PF)            Problem List Items Addressed This Visit     None      Visit Diagnoses     Infected sebaceous cyst    -  Primary    Relevant Medications    sulfamethoxazole-trimethoprim 800-160mg (BACTRIM DS) 800-160 mg Tab    Other Relevant Orders    Ambulatory Referral to General Surgery    Need for influenza vaccination        Need for pneumococcal vaccination        Relevant Orders    (In Office Administered) Pneumococcal Polysaccharide Vaccine (23 Valent) (SQ/IM) (Completed)    Colon cancer screening        Relevant Orders    Case request GI: COLONOSCOPY (Completed)          Follow up in about 2 months (around 2/3/2020), or if symptoms worsen or fail to improve.

## 2019-12-03 NOTE — PATIENT INSTRUCTIONS
Check with your pharmacy regarding new shingles vaccine.     Abscess (Antibiotic Treatment Only)  An abscess (sometimes called a boil) happens when bacteria get trapped under the skin and start to grow. Pus forms inside the abscess as the body responds to the bacteria. An abscess can happen with an insect bite, ingrown hair, blocked oil gland, pimple, cyst, or puncture wound.  In the early stages, your wound may be red and tender. For this stage, you may get antibiotics. If the abscess does not get better with antibiotics, it will need to be drained with a small cut.  Home care  These tips will help you care for your abscess at home:  · Soak the wound in hot water or apply hot packs (small towel soaked in hot water) to the area for 20 minutes at a time. Do this 3 to 4 times a day.  · Do not cut, squeeze, or pop the boil yourself.  · Apply antibiotic cream or ointment to the skin 3 to 4 times a day, unless something else was prescribed. Some ointments include an antibiotic plus a pain reliever.  · If your doctor prescribed antibiotics, do not stop taking them until you have finished the medicine or the doctor tells you to stop.  · You may use an over-the-counter pain medicine to control pain, unless another pain medicine was prescribed. If you have chronic liver or kidney disease or ever had a stomach ulcer or gastrointestinal bleeding, talk with your doctor before using these any of these.  Follow-up care  Follow up with your healthcare provider, or as advised. Check your wound each day for the signs of worsening infection listed below.  When to seek medical advice  Get prompt medical attention if any of these occur:  · An increase in redness or swelling  · Red streaks in the skin leading away from the abscess  · An increase in local pain or swelling  · Fever of 100.4ºF (38ºC) or higher, or as directed by your healthcare provider  · Pus or fluid coming from the abscess  · Boil returns after getting better  Date  Last Reviewed: 9/1/2016  © 5030-6216 [employer]. [address] [place] [address], [place] [address]. All rights reserved. This information is not intended as a substitute for professional medical care. Always follow your healthcare professional's instructions.        Abscess (Antibiotic Treatment Only)  An abscess (sometimes called a boil) happens when bacteria get trapped under the skin and start to grow. Pus forms inside the abscess as the body responds to the bacteria. An abscess can happen with an insect bite, ingrown hair, blocked oil gland, pimple, cyst, or puncture wound.  In the early stages, your wound may be red and tender. For this stage, you may get antibiotics. If the abscess does not get better with antibiotics, it will need to be drained with a small cut.  Home care  These tips will help you care for your abscess at home:  · Soak the wound in hot water or apply hot packs (small towel soaked in hot water) to the area for 20 minutes at a time. Do this 3 to 4 times a day.  · Do not cut, squeeze, or pop the boil yourself.  · Apply antibiotic cream or ointment to the skin 3 to 4 times a day, unless something else was prescribed. Some ointments include an antibiotic plus a pain reliever.  · If your doctor prescribed antibiotics, do not stop taking them until you have finished the medicine or the doctor tells you to stop.  · You may use an over-the-counter pain medicine to control pain, unless another pain medicine was prescribed. If you have chronic liver or kidney disease or ever had a stomach ulcer or gastrointestinal bleeding, talk with your doctor before using these any of these.  Follow-up care  Follow up with your healthcare provider, or as advised. Check your wound each day for the signs of worsening infection listed below.  When to seek medical advice  Get prompt medical attention if any of these occur:  · An increase in redness or swelling  · Red streaks in the skin leading away from the  abscess  · An increase in local pain or swelling  · Fever of 100.4ºF (38ºC) or higher, or as directed by your healthcare provider  · Pus or fluid coming from the abscess  · Boil returns after getting better  Date Last Reviewed: 9/1/2016  © 4716-9000 [employer]. [address] [place] [address]. All rights reserved. This information is not intended as a substitute for professional medical care. Always follow your healthcare professional's instructions.

## 2019-12-03 NOTE — TELEPHONE ENCOUNTER
Patient states that he will call to schedule his 2 month F/U with Doctor Da Weston. 12/03/2019 Evette

## 2019-12-05 ENCOUNTER — TELEPHONE (OUTPATIENT)
Dept: ENDOSCOPY | Facility: HOSPITAL | Age: 59
End: 2019-12-05

## 2019-12-05 ENCOUNTER — OFFICE VISIT (OUTPATIENT)
Dept: SURGERY | Facility: CLINIC | Age: 59
End: 2019-12-05
Payer: COMMERCIAL

## 2019-12-05 VITALS
HEART RATE: 64 BPM | HEIGHT: 72 IN | TEMPERATURE: 98 F | SYSTOLIC BLOOD PRESSURE: 127 MMHG | DIASTOLIC BLOOD PRESSURE: 77 MMHG | BODY MASS INDEX: 29.21 KG/M2 | WEIGHT: 215.63 LBS

## 2019-12-05 DIAGNOSIS — R22.1 LOCALIZED SWELLING, MASS OR LUMP OF NECK: ICD-10-CM

## 2019-12-05 DIAGNOSIS — R22.2 MASS OF SUBCUTANEOUS TISSUE OF BACK: Primary | ICD-10-CM

## 2019-12-05 PROCEDURE — 99999 PR PBB SHADOW E&M-EST. PATIENT-LVL III: CPT | Mod: PBBFAC,,, | Performed by: SURGERY

## 2019-12-05 PROCEDURE — 99204 OFFICE O/P NEW MOD 45 MIN: CPT | Mod: S$GLB,,, | Performed by: SURGERY

## 2019-12-05 PROCEDURE — 99204 PR OFFICE/OUTPT VISIT, NEW, LEVL IV, 45-59 MIN: ICD-10-PCS | Mod: S$GLB,,, | Performed by: SURGERY

## 2019-12-05 PROCEDURE — 99999 PR PBB SHADOW E&M-EST. PATIENT-LVL III: ICD-10-PCS | Mod: PBBFAC,,, | Performed by: SURGERY

## 2019-12-05 NOTE — LETTER
December 6, 2019      Da Weston MD  [address]  [place] [address]           [facility]  [address] [facility] [place] [address]  Phone: [phone]  Fax: [phone]          Patient: Rafa Padilla   MR Number: [id number]   YOB: 1960   Date of Visit: 12/5/2019       Dear Dr. Da Weston:    Thank you for referring Rafa Padilla to me for evaluation. Attached you will find relevant portions of my assessment and plan of care.    If you have questions, please do not hesitate to call me. I look forward to following Rafa Padilla along with you.    Sincerely,    Ramonita Chavez MD    Enclosure  CC:  No Recipients    If you would like to receive this communication electronically, please contact [email or web address] or [phone] to request more information on [facility] Link access.    For providers and/or their staff who would like to refer a patient to [facility], please contact us through our one-stop-shop provider referral line, [facility], at [phone].    If you feel you have received this communication in error or would no longer like to receive these types of communications, please e-mail [email or web address]

## 2019-12-06 PROBLEM — R22.1 LOCALIZED SWELLING, MASS OR LUMP OF NECK: Status: ACTIVE | Noted: 2019-12-06

## 2019-12-06 PROBLEM — R22.2 MASS OF SUBCUTANEOUS TISSUE OF BACK: Status: ACTIVE | Noted: 2019-12-06

## 2019-12-06 NOTE — PROGRESS NOTES
History & Physical  General Surgery      SUBJECTIVE:     Chief Complaint/Reason for Admission: Consult      History of Present Illness:  Patient is a 59 y.o. male presents with Consult    58 yo M with posterior neck subcutaneous mass since 2017 that has gradually grown in size. It has recently had increasing discomfort and warmth with overlying redness. He was begun on bactrim with interval improvement. He denies any prior infection or I&D to the site. He also notes another lesion on his right back which he would like removed.       Current Outpatient Medications:     aspirin 81 mg Tab, Take by mouth. 1 Tablet Oral Every day, Disp: , Rfl:     atorvastatin (LIPITOR) 20 MG tablet, Take 1 tablet (20 mg total) by mouth once daily., Disp: 30 tablet, Rfl: 0    sildenafil (VIAGRA) 100 MG tablet, 1/2 or 1 tab po q 24 hours prn sexual activity, Disp: 15 tablet, Rfl: 1    sulfamethoxazole-trimethoprim 800-160mg (BACTRIM DS) 800-160 mg Tab, Take 1 tablet by mouth 2 (two) times daily. for 10 days, Disp: 20 tablet, Rfl: 0    tamsulosin (FLOMAX) 0.4 mg Cap, TAKE 2 CAPSULES(0.8 MG) BY MOUTH EVERY DAY, Disp: 180 capsule, Rfl: 0    sodium,potassium,mag sulfates (SUPREP BOWEL PREP KIT) 17.5-3.13-1.6 gram SolR, Take as directed (Patient not taking: Reported on 12/3/2019), Disp: 354 mL, Rfl: 0    Review of patient's allergies indicates:  No Known Allergies    Past Medical History:   Diagnosis Date    Anxiety     BPH (benign prostatic hyperplasia)     Degenerative disc disease     Hyperlipidemia     Prediabetes     Spinal stenosis, lumbar     Tobacco use      No past surgical history on file.  Family History   Problem Relation Age of Onset    Arthritis Mother     COPD Mother     Diabetes Mother     Heart disease Mother     Hypertension Mother     No Known Problems Father      Social History     Tobacco Use    Smoking status: Current Every Day Smoker     Packs/day: 0.50     Years: 40.00     Pack years: 20.00      Types: Cigarettes    Smokeless tobacco: Never Used   Substance Use Topics    Alcohol use: Yes     Comment: occasionally    Drug use: No        Review of Systems:  Review of Systems   Constitutional: Negative for unexpected weight change.   HENT: Negative for congestion.    Eyes: Negative for visual disturbance.   Respiratory: Negative for shortness of breath.    Cardiovascular: Negative for chest pain.   Gastrointestinal: Negative for abdominal pain.   Genitourinary: Negative for difficulty urinating.   Skin: Positive for color change (over midline posterior neck mass). Negative for rash.   Allergic/Immunologic: Negative for immunocompromised state.   Neurological: Negative for weakness.   Psychiatric/Behavioral: The patient is not nervous/anxious.        OBJECTIVE:     Vital Signs (Most Recent)  /77 (BP Location: Left arm, Patient Position: Sitting, BP Method: Small (Automatic))   Pulse 64   Temp 98.2 °F (36.8 °C) (Oral)   Ht 6' (1.829 m)   Wt 97.8 kg (215 lb 9.8 oz)   BMI 29.24 kg/m²     Physical Exam:   Physical Exam   Constitutional: He is oriented to person, place, and time. He appears well-developed and well-nourished. No distress.   HENT:   Head: Normocephalic and atraumatic.   Eyes: EOM are normal.   Neck: Neck supple.   Cardiovascular: Normal rate and regular rhythm.   Pulmonary/Chest: Effort normal.   Abdominal: Soft. He exhibits no distension. There is no tenderness.   Musculoskeletal: Normal range of motion.   Neurological: He is alert and oriented to person, place, and time.   Skin: Skin is warm.        Vitals reviewed.        ASSESSMENT/PLAN:     Mass of subcutaneous tissue of back    Localized swelling, mass or lump of neck      Instructed patient to continue antibiotic therapy. Once infection resolved, will proceed with surgical excision.    To return to procedure visit. Hold aspirin for 7 days prior.     Ramonita Chavez

## 2019-12-13 DIAGNOSIS — Z91.89 AT RISK FOR CORONARY ARTERY DISEASE: ICD-10-CM

## 2019-12-13 RX ORDER — ATORVASTATIN CALCIUM 20 MG/1
20 TABLET, FILM COATED ORAL DAILY
Qty: 30 TABLET | Refills: 1 | Status: SHIPPED | OUTPATIENT
Start: 2019-12-13 | End: 2020-02-10

## 2019-12-19 ENCOUNTER — PROCEDURE VISIT (OUTPATIENT)
Dept: SURGERY | Facility: CLINIC | Age: 59
End: 2019-12-19
Payer: COMMERCIAL

## 2019-12-19 VITALS
DIASTOLIC BLOOD PRESSURE: 63 MMHG | HEIGHT: 72 IN | SYSTOLIC BLOOD PRESSURE: 116 MMHG | TEMPERATURE: 98 F | BODY MASS INDEX: 29.15 KG/M2 | WEIGHT: 215.19 LBS | HEART RATE: 63 BPM

## 2019-12-19 DIAGNOSIS — R22.1 LOCALIZED SWELLING, MASS OR LUMP OF NECK: ICD-10-CM

## 2019-12-19 DIAGNOSIS — R22.2 MASS OF SUBCUTANEOUS TISSUE OF BACK: Primary | ICD-10-CM

## 2019-12-19 PROCEDURE — 12042 EXC, CYST: ICD-10-PCS | Mod: S$GLB,,, | Performed by: SURGERY

## 2019-12-19 PROCEDURE — 88307 TISSUE EXAM BY PATHOLOGIST: CPT | Mod: 26,,, | Performed by: PATHOLOGY

## 2019-12-19 PROCEDURE — 88307 PR  SURG PATH,LEVEL V: ICD-10-PCS | Mod: 26,,, | Performed by: PATHOLOGY

## 2019-12-19 PROCEDURE — 12042 INTMD RPR N-HF/GENIT2.6-7.5: CPT | Mod: S$GLB,,, | Performed by: SURGERY

## 2019-12-19 PROCEDURE — 88307 TISSUE EXAM BY PATHOLOGIST: CPT | Performed by: PATHOLOGY

## 2019-12-19 PROCEDURE — 88305 TISSUE EXAM BY PATHOLOGIST: CPT | Mod: 59 | Performed by: PATHOLOGY

## 2019-12-19 PROCEDURE — 11423 EXC H-F-NK-SP B9+MARG 2.1-3: CPT | Mod: 51,S$GLB,, | Performed by: SURGERY

## 2019-12-19 PROCEDURE — 11423 EXC, CYST: ICD-10-PCS | Mod: 51,S$GLB,, | Performed by: SURGERY

## 2019-12-19 PROCEDURE — 11403 PR EXC SKIN BENIG 2.1-3 CM TRUNK,ARM,LEG: ICD-10-PCS | Mod: 51,,, | Performed by: SURGERY

## 2019-12-19 PROCEDURE — 12032 INTMD RPR S/A/T/EXT 2.6-7.5: CPT | Mod: 59,S$GLB,, | Performed by: SURGERY

## 2019-12-19 PROCEDURE — 12032 EXC, CYST: ICD-10-PCS | Mod: 59,S$GLB,, | Performed by: SURGERY

## 2019-12-19 PROCEDURE — 11403 EXC TR-EXT B9+MARG 2.1-3CM: CPT | Mod: 51,,, | Performed by: SURGERY

## 2019-12-19 RX ORDER — SULFAMETHOXAZOLE AND TRIMETHOPRIM 800; 160 MG/1; MG/1
1 TABLET ORAL 2 TIMES DAILY
Qty: 20 TABLET | Refills: 0 | Status: SHIPPED | OUTPATIENT
Start: 2019-12-19 | End: 2021-04-19 | Stop reason: ALTCHOICE

## 2019-12-19 RX ORDER — HYDROCODONE BITARTRATE AND ACETAMINOPHEN 5; 325 MG/1; MG/1
1 TABLET ORAL EVERY 4 HOURS PRN
Qty: 30 TABLET | Refills: 0 | Status: SHIPPED | OUTPATIENT
Start: 2019-12-19 | End: 2019-12-29

## 2019-12-20 NOTE — PROCEDURES
"Exc, Cyst  Date/Time: 12/19/2019 10:20 AM  Performed by: Ramonita Chavez MD  Authorized by: Ramonita Chavez MD     Consent Done?:  Yes (Written)  Time out: Immediately prior to procedure a "time out" was called to verify the correct patient, procedure, equipment, support staff and site/side marked as required.    INDICATIONS:cyst  LOCATION:  Body area:  Neck / anterior thoraxleft    PREP:Position:  Prone    ANESTHESIA:  Anesthesia:  Local infiltration  Local anesthetic:  Lidocaine 1% with epinephrine    PROCEDURE DETAILS:  Excision type:  Skin  Malignancy:  Benign  Excision size (cm):  3  Scalpel size:  15  Incision type:  Elliptical  Specimens?: Yes    Specimens submitted to pathology.  Hemostasis was obtained.  Estimated blood loss (cc):  15  Wound closure:  Complex layered  Wound repair size (cm):  3  Sutures:  3-0 Vicryl and 4-0 Monocryl  Sterile dressings:  Other (comments)  Post-op diagnosis: Same as pre-op diagnosis.  Patient tolerated the procedure well with no immediate complications.  Post-operative instructions were provided for the patient.  Patient was discharged and will follow up for wound check and pathology results.   Incision dressed with dermabond.     Exc, Cyst  Date/Time: 12/19/2019 10:20 AM  Performed by: Ramonita Chavez MD  Authorized by: Ramonita Chavez MD     Consent Done?:  Yes (Verbal)  Time out: Immediately prior to procedure a "time out" was called to verify the correct patient, procedure, equipment, support staff and site/side marked as required.      STAFF:  Assistants?: No    INDICATIONS:cyst  LOCATION:  Body area:  Back / flankright    PREP:  Patient was prepped and draped in the normal sterile fashion.Position:  Prone    ANESTHESIA:  Anesthesia:  Local infiltration  Local anesthetic:  Lidocaine 1% with epinephrine    PROCEDURE DETAILS:  Excision type:  Skin  Malignancy:  Benign  Excision size (cm):  3  Scalpel size:  15  Incision type:  Elliptical  Specimens?: Yes  "   Specimens submitted to pathology.  Hemostasis was obtained.  Estimated blood loss (cc):  10  Wound closure:  Complex layered  Wound repair size (cm):  3  Sutures:  3-0 Vicryl and 4-0 Monocryl  Sterile dressings:  Other (comments)  Post-op diagnosis: Same as pre-op diagnosis.  Patient tolerated the procedure well with no immediate complications.  Post-operative instructions were provided for the patient.  Patient was discharged and will follow up for wound check and pathology results.   Incision dressed with dermabond.

## 2020-01-03 ENCOUNTER — OFFICE VISIT (OUTPATIENT)
Dept: SURGERY | Facility: CLINIC | Age: 60
End: 2020-01-03
Payer: COMMERCIAL

## 2020-01-03 VITALS
HEART RATE: 66 BPM | HEIGHT: 72 IN | TEMPERATURE: 100 F | BODY MASS INDEX: 29.32 KG/M2 | SYSTOLIC BLOOD PRESSURE: 160 MMHG | DIASTOLIC BLOOD PRESSURE: 87 MMHG | WEIGHT: 216.5 LBS

## 2020-01-03 DIAGNOSIS — R22.1 LOCALIZED SWELLING, MASS OR LUMP OF NECK: Primary | ICD-10-CM

## 2020-01-03 DIAGNOSIS — Z98.890 POST-OPERATIVE STATE: ICD-10-CM

## 2020-01-03 PROCEDURE — 99024 POSTOP FOLLOW-UP VISIT: CPT | Mod: S$GLB,,, | Performed by: PHYSICIAN ASSISTANT

## 2020-01-03 PROCEDURE — 99999 PR PBB SHADOW E&M-EST. PATIENT-LVL III: CPT | Mod: PBBFAC,,, | Performed by: PHYSICIAN ASSISTANT

## 2020-01-03 PROCEDURE — 99024 PR POST-OP FOLLOW-UP VISIT: ICD-10-PCS | Mod: S$GLB,,, | Performed by: PHYSICIAN ASSISTANT

## 2020-01-03 PROCEDURE — 99999 PR PBB SHADOW E&M-EST. PATIENT-LVL III: ICD-10-PCS | Mod: PBBFAC,,, | Performed by: PHYSICIAN ASSISTANT

## 2020-01-03 NOTE — PROGRESS NOTES
Rafa Padilla is status post excision of a mass from the back of his head and his middle upper back. He presents today for follow-up care.  He is doing well and has no complaints.     PE: Incisions clean, dry, and intact.  Pathology: Pending    A/P:  Normal post-operative course.     Return to clinic as needed.

## 2020-01-07 LAB
FINAL PATHOLOGIC DIAGNOSIS: NORMAL
GROSS: NORMAL

## 2020-01-11 DIAGNOSIS — N40.0 BENIGN PROSTATIC HYPERPLASIA WITHOUT LOWER URINARY TRACT SYMPTOMS: ICD-10-CM

## 2020-01-11 RX ORDER — TAMSULOSIN HYDROCHLORIDE 0.4 MG/1
CAPSULE ORAL
Qty: 180 CAPSULE | Refills: 0 | Status: SHIPPED | OUTPATIENT
Start: 2020-01-11 | End: 2020-04-07

## 2020-02-10 DIAGNOSIS — Z91.89 AT RISK FOR CORONARY ARTERY DISEASE: ICD-10-CM

## 2020-02-10 RX ORDER — ATORVASTATIN CALCIUM 20 MG/1
TABLET, FILM COATED ORAL
Qty: 30 TABLET | Refills: 0 | Status: SHIPPED | OUTPATIENT
Start: 2020-02-10 | End: 2020-03-12 | Stop reason: SDUPTHER

## 2020-03-12 DIAGNOSIS — Z91.89 AT RISK FOR CORONARY ARTERY DISEASE: ICD-10-CM

## 2020-03-12 RX ORDER — ATORVASTATIN CALCIUM 20 MG/1
20 TABLET, FILM COATED ORAL DAILY
Qty: 30 TABLET | Refills: 0 | Status: SHIPPED | OUTPATIENT
Start: 2020-03-12 | End: 2020-04-21 | Stop reason: SDUPTHER

## 2020-03-12 NOTE — TELEPHONE ENCOUNTER
Informed pt that his medication was refilled but he is due for appointment. He verbalized understanding and stated he will call back to schedule.

## 2020-04-07 DIAGNOSIS — N40.0 BENIGN PROSTATIC HYPERPLASIA WITHOUT LOWER URINARY TRACT SYMPTOMS: ICD-10-CM

## 2020-04-07 RX ORDER — TAMSULOSIN HYDROCHLORIDE 0.4 MG/1
CAPSULE ORAL
Qty: 180 CAPSULE | Refills: 0 | Status: SHIPPED | OUTPATIENT
Start: 2020-04-07 | End: 2020-07-03

## 2020-04-21 DIAGNOSIS — Z91.89 AT RISK FOR CORONARY ARTERY DISEASE: ICD-10-CM

## 2020-04-21 RX ORDER — ATORVASTATIN CALCIUM 20 MG/1
20 TABLET, FILM COATED ORAL DAILY
Qty: 30 TABLET | Refills: 0 | Status: SHIPPED | OUTPATIENT
Start: 2020-04-21 | End: 2020-06-03

## 2020-06-24 ENCOUNTER — TELEPHONE (OUTPATIENT)
Dept: ENDOSCOPY | Facility: HOSPITAL | Age: 60
End: 2020-06-24

## 2020-07-03 DIAGNOSIS — N40.0 BENIGN PROSTATIC HYPERPLASIA WITHOUT LOWER URINARY TRACT SYMPTOMS: ICD-10-CM

## 2020-07-03 RX ORDER — TAMSULOSIN HYDROCHLORIDE 0.4 MG/1
CAPSULE ORAL
Qty: 60 CAPSULE | Refills: 0 | Status: SHIPPED | OUTPATIENT
Start: 2020-07-03 | End: 2020-08-17

## 2020-08-17 DIAGNOSIS — N40.0 BENIGN PROSTATIC HYPERPLASIA WITHOUT LOWER URINARY TRACT SYMPTOMS: ICD-10-CM

## 2020-08-17 RX ORDER — TAMSULOSIN HYDROCHLORIDE 0.4 MG/1
CAPSULE ORAL
Qty: 180 CAPSULE | Refills: 0 | Status: SHIPPED | OUTPATIENT
Start: 2020-08-17 | End: 2020-08-27

## 2020-11-24 DIAGNOSIS — N40.0 BENIGN PROSTATIC HYPERPLASIA WITHOUT LOWER URINARY TRACT SYMPTOMS: ICD-10-CM

## 2020-11-24 RX ORDER — TAMSULOSIN HYDROCHLORIDE 0.4 MG/1
CAPSULE ORAL
Qty: 60 CAPSULE | Refills: 0 | Status: CANCELLED | OUTPATIENT
Start: 2020-11-24

## 2020-11-24 RX ORDER — TAMSULOSIN HYDROCHLORIDE 0.4 MG/1
0.8 CAPSULE ORAL DAILY
Qty: 60 CAPSULE | Refills: 2 | Status: SHIPPED | OUTPATIENT
Start: 2020-11-24 | End: 2021-03-27

## 2021-03-09 ENCOUNTER — TELEPHONE (OUTPATIENT)
Dept: INTERNAL MEDICINE | Facility: CLINIC | Age: 61
End: 2021-03-09

## 2021-03-12 ENCOUNTER — IMMUNIZATION (OUTPATIENT)
Dept: INTERNAL MEDICINE | Facility: CLINIC | Age: 61
End: 2021-03-12
Payer: COMMERCIAL

## 2021-03-12 DIAGNOSIS — Z23 NEED FOR VACCINATION: Primary | ICD-10-CM

## 2021-03-12 PROCEDURE — 91300 COVID-19, MRNA, LNP-S, PF, 30 MCG/0.3 ML DOSE VACCINE: CPT | Mod: PBBFAC | Performed by: FAMILY MEDICINE

## 2021-03-25 ENCOUNTER — PATIENT MESSAGE (OUTPATIENT)
Dept: ADMINISTRATIVE | Facility: HOSPITAL | Age: 61
End: 2021-03-25

## 2021-04-02 ENCOUNTER — IMMUNIZATION (OUTPATIENT)
Dept: INTERNAL MEDICINE | Facility: CLINIC | Age: 61
End: 2021-04-02
Payer: COMMERCIAL

## 2021-04-02 DIAGNOSIS — Z23 NEED FOR VACCINATION: Primary | ICD-10-CM

## 2021-04-02 PROCEDURE — 0002A COVID-19, MRNA, LNP-S, PF, 30 MCG/0.3 ML DOSE VACCINE: ICD-10-PCS | Mod: CV19,S$GLB,, | Performed by: FAMILY MEDICINE

## 2021-04-02 PROCEDURE — 91300 COVID-19, MRNA, LNP-S, PF, 30 MCG/0.3 ML DOSE VACCINE: ICD-10-PCS | Mod: S$GLB,,, | Performed by: FAMILY MEDICINE

## 2021-04-02 PROCEDURE — 91300 COVID-19, MRNA, LNP-S, PF, 30 MCG/0.3 ML DOSE VACCINE: CPT | Mod: S$GLB,,, | Performed by: FAMILY MEDICINE

## 2021-04-02 PROCEDURE — 0002A COVID-19, MRNA, LNP-S, PF, 30 MCG/0.3 ML DOSE VACCINE: CPT | Mod: CV19,S$GLB,, | Performed by: FAMILY MEDICINE

## 2021-04-16 ENCOUNTER — HOSPITAL ENCOUNTER (OUTPATIENT)
Dept: RADIOLOGY | Facility: HOSPITAL | Age: 61
Discharge: HOME | End: 2021-04-16
Attending: PHYSICIAN ASSISTANT
Payer: COMMERCIAL

## 2021-04-16 ENCOUNTER — OFFICE VISIT (OUTPATIENT)
Dept: URGENT CARE | Facility: CLINIC | Age: 61
End: 2021-04-16
Payer: COMMERCIAL

## 2021-04-16 VITALS
WEIGHT: 225 LBS | HEART RATE: 66 BPM | OXYGEN SATURATION: 98 % | TEMPERATURE: 99 F | SYSTOLIC BLOOD PRESSURE: 165 MMHG | DIASTOLIC BLOOD PRESSURE: 85 MMHG | HEIGHT: 72 IN | BODY MASS INDEX: 30.48 KG/M2

## 2021-04-16 DIAGNOSIS — N45.2 ORCHITIS OF LEFT TESTICLE: Primary | ICD-10-CM

## 2021-04-16 DIAGNOSIS — I86.1 BILATERAL VARICOCELES: ICD-10-CM

## 2021-04-16 DIAGNOSIS — N50.812 LEFT TESTICULAR PAIN: ICD-10-CM

## 2021-04-16 DIAGNOSIS — N50.89 TESTICULAR SWELLING, LEFT: ICD-10-CM

## 2021-04-16 DIAGNOSIS — N43.3 BILATERAL HYDROCELE: ICD-10-CM

## 2021-04-16 PROCEDURE — 76870 US EXAM SCROTUM: CPT | Mod: TC

## 2021-04-16 PROCEDURE — 76870 US EXAM SCROTUM: CPT | Mod: 26,,, | Performed by: RADIOLOGY

## 2021-04-16 PROCEDURE — 99213 PR OFFICE/OUTPT VISIT, EST, LEVL III, 20-29 MIN: ICD-10-PCS | Mod: S$GLB,,, | Performed by: PHYSICIAN ASSISTANT

## 2021-04-16 PROCEDURE — 76870 US SCROTUM AND TESTICLES: ICD-10-PCS | Mod: 26,,, | Performed by: RADIOLOGY

## 2021-04-16 PROCEDURE — 99213 OFFICE O/P EST LOW 20 MIN: CPT | Mod: S$GLB,,, | Performed by: PHYSICIAN ASSISTANT

## 2021-04-16 RX ORDER — DOXYCYCLINE 100 MG/1
100 CAPSULE ORAL 2 TIMES DAILY
Qty: 20 CAPSULE | Refills: 0 | Status: SHIPPED | OUTPATIENT
Start: 2021-04-16 | End: 2021-04-26

## 2021-04-16 RX ORDER — NAPROXEN 500 MG/1
500 TABLET ORAL 2 TIMES DAILY WITH MEALS
Qty: 20 TABLET | Refills: 0 | Status: SHIPPED | OUTPATIENT
Start: 2021-04-16 | End: 2021-04-26

## 2021-04-19 ENCOUNTER — OFFICE VISIT (OUTPATIENT)
Dept: INTERNAL MEDICINE | Facility: CLINIC | Age: 61
End: 2021-04-19
Payer: COMMERCIAL

## 2021-04-19 VITALS
DIASTOLIC BLOOD PRESSURE: 88 MMHG | BODY MASS INDEX: 29.69 KG/M2 | WEIGHT: 218.94 LBS | TEMPERATURE: 97 F | OXYGEN SATURATION: 97 % | SYSTOLIC BLOOD PRESSURE: 130 MMHG | HEART RATE: 60 BPM

## 2021-04-19 DIAGNOSIS — N40.0 BENIGN PROSTATIC HYPERPLASIA WITHOUT LOWER URINARY TRACT SYMPTOMS: ICD-10-CM

## 2021-04-19 DIAGNOSIS — R73.03 PREDIABETES: ICD-10-CM

## 2021-04-19 DIAGNOSIS — Z12.2 ENCOUNTER FOR SCREENING FOR MALIGNANT NEOPLASM OF RESPIRATORY ORGANS: ICD-10-CM

## 2021-04-19 DIAGNOSIS — E78.5 HYPERLIPIDEMIA, UNSPECIFIED HYPERLIPIDEMIA TYPE: ICD-10-CM

## 2021-04-19 DIAGNOSIS — Z72.0 TOBACCO USE: ICD-10-CM

## 2021-04-19 DIAGNOSIS — Z00.00 ROUTINE GENERAL MEDICAL EXAMINATION AT A HEALTH CARE FACILITY: Primary | ICD-10-CM

## 2021-04-19 DIAGNOSIS — F17.200 SMOKER: ICD-10-CM

## 2021-04-19 DIAGNOSIS — Z12.11 COLON CANCER SCREENING: ICD-10-CM

## 2021-04-19 DIAGNOSIS — Z91.89 AT RISK FOR CORONARY ARTERY DISEASE: ICD-10-CM

## 2021-04-19 PROCEDURE — 99396 PREV VISIT EST AGE 40-64: CPT | Mod: S$GLB,,, | Performed by: INTERNAL MEDICINE

## 2021-04-19 PROCEDURE — 99396 PR PREVENTIVE VISIT,EST,40-64: ICD-10-PCS | Mod: S$GLB,,, | Performed by: INTERNAL MEDICINE

## 2021-04-19 PROCEDURE — 99999 PR PBB SHADOW E&M-EST. PATIENT-LVL IV: ICD-10-PCS | Mod: PBBFAC,,, | Performed by: INTERNAL MEDICINE

## 2021-04-19 PROCEDURE — 99999 PR PBB SHADOW E&M-EST. PATIENT-LVL IV: CPT | Mod: PBBFAC,,, | Performed by: INTERNAL MEDICINE

## 2021-04-19 RX ORDER — TAMSULOSIN HYDROCHLORIDE 0.4 MG/1
0.8 CAPSULE ORAL DAILY
Qty: 180 CAPSULE | Refills: 1 | Status: SHIPPED | OUTPATIENT
Start: 2021-04-19 | End: 2022-03-13

## 2021-04-19 RX ORDER — ATORVASTATIN CALCIUM 20 MG/1
20 TABLET, FILM COATED ORAL DAILY
Qty: 90 TABLET | Refills: 1 | Status: SHIPPED | OUTPATIENT
Start: 2021-04-19 | End: 2022-09-15 | Stop reason: SDUPTHER

## 2021-04-22 ENCOUNTER — LAB VISIT (OUTPATIENT)
Dept: LAB | Facility: HOSPITAL | Age: 61
End: 2021-04-22
Attending: INTERNAL MEDICINE
Payer: COMMERCIAL

## 2021-04-22 DIAGNOSIS — Z00.00 ROUTINE GENERAL MEDICAL EXAMINATION AT A HEALTH CARE FACILITY: ICD-10-CM

## 2021-04-22 LAB
ALBUMIN SERPL BCP-MCNC: 4.1 G/DL (ref 3.5–5.2)
ALP SERPL-CCNC: 48 U/L (ref 55–135)
ALT SERPL W/O P-5'-P-CCNC: 23 U/L (ref 10–44)
ANION GAP SERPL CALC-SCNC: 7 MMOL/L (ref 8–16)
AST SERPL-CCNC: 22 U/L (ref 10–40)
BASOPHILS # BLD AUTO: 0.09 K/UL (ref 0–0.2)
BASOPHILS NFR BLD: 1.3 % (ref 0–1.9)
BILIRUB SERPL-MCNC: 0.4 MG/DL (ref 0.1–1)
BUN SERPL-MCNC: 17 MG/DL (ref 8–23)
CALCIUM SERPL-MCNC: 9.2 MG/DL (ref 8.7–10.5)
CHLORIDE SERPL-SCNC: 105 MMOL/L (ref 95–110)
CHOLEST SERPL-MCNC: 180 MG/DL (ref 120–199)
CHOLEST/HDLC SERPL: 3.9 {RATIO} (ref 2–5)
CO2 SERPL-SCNC: 28 MMOL/L (ref 23–29)
COMPLEXED PSA SERPL-MCNC: 3.1 NG/ML (ref 0–4)
CREAT SERPL-MCNC: 0.9 MG/DL (ref 0.5–1.4)
DIFFERENTIAL METHOD: NORMAL
EOSINOPHIL # BLD AUTO: 0.3 K/UL (ref 0–0.5)
EOSINOPHIL NFR BLD: 4.4 % (ref 0–8)
ERYTHROCYTE [DISTWIDTH] IN BLOOD BY AUTOMATED COUNT: 13.2 % (ref 11.5–14.5)
EST. GFR  (AFRICAN AMERICAN): >60 ML/MIN/1.73 M^2
EST. GFR  (NON AFRICAN AMERICAN): >60 ML/MIN/1.73 M^2
GLUCOSE SERPL-MCNC: 109 MG/DL (ref 70–110)
HCT VFR BLD AUTO: 48.1 % (ref 40–54)
HDLC SERPL-MCNC: 46 MG/DL (ref 40–75)
HDLC SERPL: 25.6 % (ref 20–50)
HGB BLD-MCNC: 15.8 G/DL (ref 14–18)
IMM GRANULOCYTES # BLD AUTO: 0.02 K/UL (ref 0–0.04)
IMM GRANULOCYTES NFR BLD AUTO: 0.3 % (ref 0–0.5)
LDLC SERPL CALC-MCNC: 116.6 MG/DL (ref 63–159)
LYMPHOCYTES # BLD AUTO: 2.6 K/UL (ref 1–4.8)
LYMPHOCYTES NFR BLD: 38.1 % (ref 18–48)
MCH RBC QN AUTO: 29.9 PG (ref 27–31)
MCHC RBC AUTO-ENTMCNC: 32.8 G/DL (ref 32–36)
MCV RBC AUTO: 91 FL (ref 82–98)
MONOCYTES # BLD AUTO: 0.7 K/UL (ref 0.3–1)
MONOCYTES NFR BLD: 9.7 % (ref 4–15)
NEUTROPHILS # BLD AUTO: 3.1 K/UL (ref 1.8–7.7)
NEUTROPHILS NFR BLD: 46.2 % (ref 38–73)
NONHDLC SERPL-MCNC: 134 MG/DL
NRBC BLD-RTO: 0 /100 WBC
PLATELET # BLD AUTO: 256 K/UL (ref 150–450)
PMV BLD AUTO: 10.1 FL (ref 9.2–12.9)
POTASSIUM SERPL-SCNC: 3.9 MMOL/L (ref 3.5–5.1)
PROT SERPL-MCNC: 7.3 G/DL (ref 6–8.4)
RBC # BLD AUTO: 5.29 M/UL (ref 4.6–6.2)
SODIUM SERPL-SCNC: 140 MMOL/L (ref 136–145)
TRIGL SERPL-MCNC: 87 MG/DL (ref 30–150)
TSH SERPL DL<=0.005 MIU/L-ACNC: 1 UIU/ML (ref 0.4–4)
WBC # BLD AUTO: 6.8 K/UL (ref 3.9–12.7)

## 2021-04-22 PROCEDURE — 84443 ASSAY THYROID STIM HORMONE: CPT | Performed by: INTERNAL MEDICINE

## 2021-04-22 PROCEDURE — 86803 HEPATITIS C AB TEST: CPT | Performed by: INTERNAL MEDICINE

## 2021-04-22 PROCEDURE — 84153 ASSAY OF PSA TOTAL: CPT | Performed by: INTERNAL MEDICINE

## 2021-04-22 PROCEDURE — 86703 HIV-1/HIV-2 1 RESULT ANTBDY: CPT | Performed by: INTERNAL MEDICINE

## 2021-04-22 PROCEDURE — 36415 COLL VENOUS BLD VENIPUNCTURE: CPT | Performed by: INTERNAL MEDICINE

## 2021-04-22 PROCEDURE — 85025 COMPLETE CBC W/AUTO DIFF WBC: CPT | Performed by: INTERNAL MEDICINE

## 2021-04-22 PROCEDURE — 80061 LIPID PANEL: CPT | Performed by: INTERNAL MEDICINE

## 2021-04-22 PROCEDURE — 80053 COMPREHEN METABOLIC PANEL: CPT | Performed by: INTERNAL MEDICINE

## 2021-04-23 LAB
HCV AB SERPL QL IA: NEGATIVE
HIV 1+2 AB+HIV1 P24 AG SERPL QL IA: NEGATIVE

## 2021-04-27 ENCOUNTER — HOSPITAL ENCOUNTER (OUTPATIENT)
Dept: RADIOLOGY | Facility: HOSPITAL | Age: 61
Discharge: HOME | End: 2021-04-27
Attending: INTERNAL MEDICINE
Payer: COMMERCIAL

## 2021-04-27 DIAGNOSIS — Z12.2 ENCOUNTER FOR SCREENING FOR MALIGNANT NEOPLASM OF RESPIRATORY ORGANS: ICD-10-CM

## 2021-04-27 DIAGNOSIS — F17.200 SMOKER: ICD-10-CM

## 2021-04-27 DIAGNOSIS — Z72.0 TOBACCO USE: ICD-10-CM

## 2021-04-27 PROCEDURE — 71271 CT THORAX LUNG CANCER SCR C-: CPT | Mod: TC

## 2021-04-27 PROCEDURE — 71271 CT THORAX LUNG CANCER SCR C-: CPT | Mod: 26,,, | Performed by: RADIOLOGY

## 2021-04-27 PROCEDURE — 71271 CT CHEST LUNG SCREENING LOW DOSE: ICD-10-PCS | Mod: 26,,, | Performed by: RADIOLOGY

## 2022-01-24 ENCOUNTER — TELEPHONE (OUTPATIENT)
Dept: PRIMARY CARE CLINIC | Facility: CLINIC | Age: 62
End: 2022-01-24
Payer: COMMERCIAL

## 2022-01-24 NOTE — TELEPHONE ENCOUNTER
Pt was removed rom 920 appt slot after his appt time by registration. He will be readded back to schedule and marked as no show.

## 2022-04-27 ENCOUNTER — PATIENT MESSAGE (OUTPATIENT)
Dept: ADMINISTRATIVE | Facility: HOSPITAL | Age: 62
End: 2022-04-27
Payer: COMMERCIAL

## 2022-06-04 DIAGNOSIS — N40.0 BENIGN PROSTATIC HYPERPLASIA WITHOUT LOWER URINARY TRACT SYMPTOMS: ICD-10-CM

## 2022-06-04 NOTE — TELEPHONE ENCOUNTER
Care Due:                  Date            Visit Type   Department     Provider  --------------------------------------------------------------------------------                                EP -                              PRIMARY      [facility] INTER[facility]  Last Visit: 04-      CARE ([facility])   MEDICINE       Da Weston  Next Visit: None Scheduled  None         None Found                                                            Last  Test          Frequency    Reason                     Performed    Due Date  --------------------------------------------------------------------------------    Office Visit  12 months..  atorvastatin, tamsulosin.  04- 04-    CMP.........  12 months..  atorvastatin.............  04- 04-    Lipid Panel.  12 months..  atorvastatin.............  04- 04-    [employer] Embedded Care Gaps. Reference number: [id number]. 6/04/2022   6:03:36 AM CDT

## 2022-06-05 NOTE — TELEPHONE ENCOUNTER
Refill Routing Note   Medication(s) are not appropriate for processing by [facility] for the following reason(s):      - Required vitals are outdated    [facility] action(s):  Defer Medication-related problems identified:   Requires labs  Requires appointment        Medication reconciliation completed: No     Appointments  past 12m or future 3m with PCP    Date Provider   Last Visit   4/19/2021 Da Weston MD   Next Visit   Visit date not found Da Weston MD   ED visits in past 90 days: 0        Note composed:9:25 PM 06/04/2022

## 2022-06-06 DIAGNOSIS — N40.0 BENIGN PROSTATIC HYPERPLASIA WITHOUT LOWER URINARY TRACT SYMPTOMS: ICD-10-CM

## 2022-06-06 RX ORDER — TAMSULOSIN HYDROCHLORIDE 0.4 MG/1
CAPSULE ORAL
Qty: 180 CAPSULE | Refills: 0 | Status: SHIPPED | OUTPATIENT
Start: 2022-06-06 | End: 2022-09-15 | Stop reason: SDUPTHER

## 2022-06-06 RX ORDER — TAMSULOSIN HYDROCHLORIDE 0.4 MG/1
0.8 CAPSULE ORAL DAILY
Qty: 180 CAPSULE | Refills: 0 | OUTPATIENT
Start: 2022-06-06

## 2022-06-06 NOTE — TELEPHONE ENCOUNTER
No new care gaps identified.  [facility] Embedded Care Gaps. Reference number: [id number]. 6/06/2022   9:47:31 AM JASMINT

## 2022-09-15 ENCOUNTER — LAB VISIT (OUTPATIENT)
Dept: LAB | Facility: HOSPITAL | Age: 62
End: 2022-09-15
Attending: FAMILY MEDICINE
Payer: COMMERCIAL

## 2022-09-15 ENCOUNTER — OFFICE VISIT (OUTPATIENT)
Dept: PRIMARY CARE CLINIC | Facility: CLINIC | Age: 62
End: 2022-09-15
Payer: COMMERCIAL

## 2022-09-15 VITALS
WEIGHT: 210.19 LBS | HEIGHT: 72 IN | BODY MASS INDEX: 28.47 KG/M2 | SYSTOLIC BLOOD PRESSURE: 135 MMHG | OXYGEN SATURATION: 98 % | TEMPERATURE: 98 F | DIASTOLIC BLOOD PRESSURE: 88 MMHG | HEART RATE: 65 BPM

## 2022-09-15 DIAGNOSIS — M51.36 DDD (DEGENERATIVE DISC DISEASE), LUMBAR: Chronic | ICD-10-CM

## 2022-09-15 DIAGNOSIS — R73.03 PREDIABETES: ICD-10-CM

## 2022-09-15 DIAGNOSIS — E78.5 HYPERLIPIDEMIA, UNSPECIFIED HYPERLIPIDEMIA TYPE: ICD-10-CM

## 2022-09-15 DIAGNOSIS — Z23 NEED FOR PNEUMOCOCCAL VACCINATION: ICD-10-CM

## 2022-09-15 DIAGNOSIS — M48.061 SPINAL STENOSIS OF LUMBAR REGION, UNSPECIFIED WHETHER NEUROGENIC CLAUDICATION PRESENT: ICD-10-CM

## 2022-09-15 DIAGNOSIS — Z12.83 SKIN CANCER SCREENING: ICD-10-CM

## 2022-09-15 DIAGNOSIS — Z87.891 PERSONAL HISTORY OF TOBACCO USE, PRESENTING HAZARDS TO HEALTH: ICD-10-CM

## 2022-09-15 DIAGNOSIS — N40.0 BENIGN PROSTATIC HYPERPLASIA WITHOUT LOWER URINARY TRACT SYMPTOMS: ICD-10-CM

## 2022-09-15 DIAGNOSIS — Z72.0 TOBACCO USE: ICD-10-CM

## 2022-09-15 DIAGNOSIS — Z00.00 ROUTINE GENERAL MEDICAL EXAMINATION AT A HEALTH CARE FACILITY: Primary | ICD-10-CM

## 2022-09-15 DIAGNOSIS — Z63.6 CAREGIVER STRESS: ICD-10-CM

## 2022-09-15 DIAGNOSIS — M48.07 SPINAL STENOSIS, LUMBOSACRAL REGION: ICD-10-CM

## 2022-09-15 DIAGNOSIS — Z12.11 COLON CANCER SCREENING: ICD-10-CM

## 2022-09-15 DIAGNOSIS — Z91.89 AT RISK FOR CORONARY ARTERY DISEASE: ICD-10-CM

## 2022-09-15 DIAGNOSIS — Z00.00 ROUTINE GENERAL MEDICAL EXAMINATION AT A HEALTH CARE FACILITY: ICD-10-CM

## 2022-09-15 LAB
ALBUMIN SERPL BCP-MCNC: 4.5 G/DL (ref 3.5–5.2)
ALP SERPL-CCNC: 43 U/L (ref 55–135)
ALT SERPL W/O P-5'-P-CCNC: 25 U/L (ref 10–44)
ANION GAP SERPL CALC-SCNC: 8 MMOL/L (ref 8–16)
AST SERPL-CCNC: 22 U/L (ref 10–40)
BASOPHILS # BLD AUTO: 0.09 K/UL (ref 0–0.2)
BASOPHILS NFR BLD: 1.2 % (ref 0–1.9)
BILIRUB SERPL-MCNC: 0.5 MG/DL (ref 0.1–1)
BUN SERPL-MCNC: 16 MG/DL (ref 8–23)
CALCIUM SERPL-MCNC: 10 MG/DL (ref 8.7–10.5)
CHLORIDE SERPL-SCNC: 105 MMOL/L (ref 95–110)
CHOLEST SERPL-MCNC: 162 MG/DL (ref 120–199)
CHOLEST/HDLC SERPL: 3.4 {RATIO} (ref 2–5)
CO2 SERPL-SCNC: 27 MMOL/L (ref 23–29)
CREAT SERPL-MCNC: 0.9 MG/DL (ref 0.5–1.4)
DIFFERENTIAL METHOD: NORMAL
EOSINOPHIL # BLD AUTO: 0.2 K/UL (ref 0–0.5)
EOSINOPHIL NFR BLD: 2.6 % (ref 0–8)
ERYTHROCYTE [DISTWIDTH] IN BLOOD BY AUTOMATED COUNT: 12.8 % (ref 11.5–14.5)
EST. GFR  (NO RACE VARIABLE): >60 ML/MIN/1.73 M^2
ESTIMATED AVG GLUCOSE: 128 MG/DL (ref 68–131)
GLUCOSE SERPL-MCNC: 107 MG/DL (ref 70–110)
HBA1C MFR BLD: 6.1 % (ref 4–5.6)
HCT VFR BLD AUTO: 49.3 % (ref 40–54)
HDLC SERPL-MCNC: 48 MG/DL (ref 40–75)
HDLC SERPL: 29.6 % (ref 20–50)
HGB BLD-MCNC: 16.3 G/DL (ref 14–18)
IMM GRANULOCYTES # BLD AUTO: 0.02 K/UL (ref 0–0.04)
IMM GRANULOCYTES NFR BLD AUTO: 0.3 % (ref 0–0.5)
LDLC SERPL CALC-MCNC: 96.4 MG/DL (ref 63–159)
LYMPHOCYTES # BLD AUTO: 2.4 K/UL (ref 1–4.8)
LYMPHOCYTES NFR BLD: 33.4 % (ref 18–48)
MCH RBC QN AUTO: 30.6 PG (ref 27–31)
MCHC RBC AUTO-ENTMCNC: 33.1 G/DL (ref 32–36)
MCV RBC AUTO: 93 FL (ref 82–98)
MONOCYTES # BLD AUTO: 0.7 K/UL (ref 0.3–1)
MONOCYTES NFR BLD: 9.5 % (ref 4–15)
NEUTROPHILS # BLD AUTO: 3.8 K/UL (ref 1.8–7.7)
NEUTROPHILS NFR BLD: 53 % (ref 38–73)
NONHDLC SERPL-MCNC: 114 MG/DL
NRBC BLD-RTO: 0 /100 WBC
PLATELET # BLD AUTO: 262 K/UL (ref 150–450)
PMV BLD AUTO: 10.7 FL (ref 9.2–12.9)
POTASSIUM SERPL-SCNC: 4.4 MMOL/L (ref 3.5–5.1)
PROT SERPL-MCNC: 7.2 G/DL (ref 6–8.4)
RBC # BLD AUTO: 5.32 M/UL (ref 4.6–6.2)
SODIUM SERPL-SCNC: 140 MMOL/L (ref 136–145)
TRIGL SERPL-MCNC: 88 MG/DL (ref 30–150)
WBC # BLD AUTO: 7.25 K/UL (ref 3.9–12.7)

## 2022-09-15 PROCEDURE — 90471 PNEUMOCOCCAL CONJUGATE VACCINE 20-VALENT: ICD-10-PCS | Mod: S$GLB,,, | Performed by: FAMILY MEDICINE

## 2022-09-15 PROCEDURE — 80053 COMPREHEN METABOLIC PANEL: CPT | Performed by: FAMILY MEDICINE

## 2022-09-15 PROCEDURE — 99396 PREV VISIT EST AGE 40-64: CPT | Mod: 25,S$GLB,, | Performed by: FAMILY MEDICINE

## 2022-09-15 PROCEDURE — 36415 COLL VENOUS BLD VENIPUNCTURE: CPT | Mod: PN | Performed by: FAMILY MEDICINE

## 2022-09-15 PROCEDURE — 85025 COMPLETE CBC W/AUTO DIFF WBC: CPT | Performed by: FAMILY MEDICINE

## 2022-09-15 PROCEDURE — 99999 PR PBB SHADOW E&M-EST. PATIENT-LVL V: ICD-10-PCS | Mod: PBBFAC,,, | Performed by: FAMILY MEDICINE

## 2022-09-15 PROCEDURE — 84153 ASSAY OF PSA TOTAL: CPT | Performed by: FAMILY MEDICINE

## 2022-09-15 PROCEDURE — 90471 IMMUNIZATION ADMIN: CPT | Mod: S$GLB,,, | Performed by: FAMILY MEDICINE

## 2022-09-15 PROCEDURE — 84443 ASSAY THYROID STIM HORMONE: CPT | Performed by: FAMILY MEDICINE

## 2022-09-15 PROCEDURE — 99999 PR PBB SHADOW E&M-EST. PATIENT-LVL V: CPT | Mod: PBBFAC,,, | Performed by: FAMILY MEDICINE

## 2022-09-15 PROCEDURE — 90677 PNEUMOCOCCAL CONJUGATE VACCINE 20-VALENT: ICD-10-PCS | Mod: S$GLB,,, | Performed by: FAMILY MEDICINE

## 2022-09-15 PROCEDURE — 80061 LIPID PANEL: CPT | Performed by: FAMILY MEDICINE

## 2022-09-15 PROCEDURE — 90677 PCV20 VACCINE IM: CPT | Mod: S$GLB,,, | Performed by: FAMILY MEDICINE

## 2022-09-15 PROCEDURE — 99396 PR PREVENTIVE VISIT,EST,40-64: ICD-10-PCS | Mod: 25,S$GLB,, | Performed by: FAMILY MEDICINE

## 2022-09-15 PROCEDURE — 83036 HEMOGLOBIN GLYCOSYLATED A1C: CPT | Performed by: FAMILY MEDICINE

## 2022-09-15 RX ORDER — TAMSULOSIN HYDROCHLORIDE 0.4 MG/1
0.8 CAPSULE ORAL DAILY
Qty: 180 CAPSULE | Refills: 3 | Status: SHIPPED | OUTPATIENT
Start: 2022-09-15 | End: 2023-09-21

## 2022-09-15 RX ORDER — BUPROPION HYDROCHLORIDE 150 MG/1
150 TABLET ORAL DAILY
Qty: 90 TABLET | Refills: 3 | Status: SHIPPED | OUTPATIENT
Start: 2022-09-15 | End: 2024-02-20 | Stop reason: ALTCHOICE

## 2022-09-15 RX ORDER — ATORVASTATIN CALCIUM 20 MG/1
20 TABLET, FILM COATED ORAL DAILY
Qty: 90 TABLET | Refills: 3 | Status: SHIPPED | OUTPATIENT
Start: 2022-09-15 | End: 2023-09-18

## 2022-09-15 SDOH — SOCIAL DETERMINANTS OF HEALTH (SDOH): DEPENDENT RELATIVE NEEDING CARE AT HOME: Z63.6

## 2022-09-15 NOTE — PROGRESS NOTES
Subjective:      Patient ID: Rafa Padilla is a 61 y.o. male.    Chief Complaint: Follow-up    Disclaimer:  This note is prepared using voice recognition software and as such is likely to have errors and has not been proof read. Please contact me for questions.     Est care. Prior pcp Dr. Weston. I see his wife. He is the caregiver for her. She has [place]'s Chorea.     62 yo with Patient Active Problem List  Diagnosis  · BPH (benign prostatic hyperplasia)  · Hyperlipidemia  · Spinal stenosis, lumbar  · DDD (degenerative disc disease), lumbar  · Anxiety  · Tobacco use  · Prediabetes  · DJD (degenerative joint disease), cervical  · Myofascial pain  · Mass of subcutaneous tissue of back  · Localized swelling, mass or lump of neck      Out of flomax. Needing refills.   Out of cholesterol meds.  Has been on Lipitor 20.  Desirse to quit smoking. High caregiver stress.  Interested in Wellbutrin.  Also needing do CT lung cancer screening test.  Also overdue for his colonoscopy needs to plan for a time when he can get family members to help drive him home.  Also desires to have a skin cancer screening check.  Had back pain but lost feeling in legs for about 1 min with stainding. Has hx of lumbar spinal stenosis.  Did last lumbar MRI back in 2012.  Was concerned about the loss of the feeling in both the front of his thighs.  Does have to lift his wife often because she has recurrent falls.  Got dizzy one day and fell while trying to help assist wife into a chair. Had opened his eyes and room was spinning but it went away.   At times will get itching and feels like a bug might be biting him but it is new. No cause for it.         Past Medical History:  Diagnosis Date  · Anxiety   · BPH (benign prostatic hyperplasia)   · Degenerative disc disease   · Hyperlipidemia   · Prediabetes   · Spinal stenosis, lumbar   · Tobacco use     Here today for annual prev exam.  Compliant with meds without significant side effects. Energy  and appetite are good.     History reviewed. No pertinent surgical history.  Social History    Socioeconomic History  · Marital status:     Spouse name: Aurora  · Number of children: 2  · Years of education: Not on file  · Highest education level: Not on file  Occupational History    Employer: [employer]    Lab Results   Component Value Date    HGBA1C 6.0 (H) 06/28/2019    HGBA1C 5.9 (H) 01/22/2019    HGBA1C 6.1 11/18/2014      Lab Results   Component Value Date    CHOL 180 04/22/2021    CHOL 126 06/28/2019    CHOL 175 01/22/2019     Lab Results   Component Value Date    LDLCALC 116.6 04/22/2021    LDLCALC 61.0 (L) 06/28/2019    LDLCALC 108.0 01/22/2019       Wt Readings from Last 10 Encounters:   09/15/22 95.4 kg (210 lb 3.3 oz)   04/19/21 99.3 kg (218 lb 14.7 oz)   04/16/21 102.1 kg (225 lb)   01/03/20 98.2 kg (216 lb 7.9 oz)   12/19/19 97.6 kg (215 lb 2.7 oz)   12/05/19 97.8 kg (215 lb 9.8 oz)   12/03/19 98.7 kg (217 lb 9.5 oz)   05/24/19 95.3 kg (210 lb 1.6 oz)   02/22/19 97.3 kg (214 lb 8.1 oz)   01/21/19 96.3 kg (212 lb 4.9 oz)       The 10-year ASCVD risk score (Sharath DEY, et al., 2019) is: 16.1%    Values used to calculate the score:      Age: 61 years      Sex: Male      Is Non- : Yes      Diabetic: No      Tobacco smoker: Yes      Systolic Blood Pressure: 135 mmHg      Is BP treated: No      HDL Cholesterol: 46 mg/dL      Total Cholesterol: 180 mg/dL    No questionnaires on file.        Lab Results   Component Value Date    WBC 6.80 04/22/2021    HGB 15.8 04/22/2021    HCT 48.1 04/22/2021     04/22/2021    CHOL 180 04/22/2021    TRIG 87 04/22/2021    HDL 46 04/22/2021    ALT 23 04/22/2021    AST 22 04/22/2021     04/22/2021    K 3.9 04/22/2021     04/22/2021    CREATININE 0.9 04/22/2021    BUN 17 04/22/2021    CO2 28 04/22/2021    TSH 0.997 04/22/2021    PSA 3.1 04/22/2021    GLUF 109 12/18/2009    HGBA1C 6.0 (H) 06/28/2019       CT Chest Lung  Screening Low Dose  Narrative: EXAMINATION:  CT CHEST LUNG SCREENING LOW DOSE    CLINICAL HISTORY:  Lung cancer annual screening, asymptomatic, current smoker (min. 30 pack-yrs); Tobacco use    TECHNIQUE:  CT of the thorax was performed with low dose, lung screening protocol.  No contrast was administered.  Sagittal and coronal reconstructions were obtained.    COMPARISON:  05/30/2019    FINDINGS:  Lungs: The largest opacity in the right lung appears solid and measures an average of 4 mm on series 4, image 264 that is stable and seen adjacent to the fissure and is stable..  The largest opacity in the left lung appears ground-glass and measures 0.4 cm on series 4, image 261 that is stable.  The lungs show no findings consistent with emphysema.    Pleura:   No effusion..    Heart and pericardium: Normal size without effusion.    Aorta and vasculature: Atherosclerosis including coronary arteries.    Chest wall and skeletal structures: Unremarkable except age-appropriate degenerative changes.    Upper abdomen: Unremarkable.  Impression: Lung-RADS Category:  2 - Benign Appearance or Behavior - continue annual screening with LDCT in 12 months.    Clinically or potentially clinically significant non lung cancer finding:  None.    Prior Lung Cancer Modifier:  No history of prior lung cancer.    Electronically signed by: Gabino Ramirez DO  Date:    04/27/2021  Time:    14:39        Review of Systems   Constitutional:  Negative for activity change, appetite change, chills, fatigue and unexpected weight change.   HENT:  Negative for congestion, ear pain, postnasal drip, sneezing, sore throat and trouble swallowing.    Eyes:  Negative for pain and visual disturbance.   Respiratory:  Negative for cough and shortness of breath.    Cardiovascular:  Negative for chest pain and leg swelling.   Gastrointestinal:  Negative for abdominal pain, constipation, diarrhea, nausea and vomiting.   Endocrine: Negative for cold intolerance and  heat intolerance.   Genitourinary:  Negative for difficulty urinating, dysuria and flank pain.   Musculoskeletal:  Positive for arthralgias and back pain. Negative for joint swelling and neck pain.   Skin:  Negative for color change and rash.   Neurological:  Positive for numbness. Negative for dizziness, seizures and headaches.   Psychiatric/Behavioral:  Positive for dysphoric mood. Negative for behavioral problems and sleep disturbance. The patient is not nervous/anxious.    Objective:     Vitals:    09/15/22 1033   BP: 135/88   Pulse: 65   Temp: 98.2 °F (36.8 °C)   SpO2: 98%   Weight: 95.4 kg (210 lb 3.3 oz)   Height: 6' (1.829 m)     Physical Exam  Vitals reviewed.   Constitutional:       General: He is not in acute distress.     Appearance: Normal appearance. He is well-developed, well-groomed and overweight.   HENT:      Head: Normocephalic and atraumatic.      Right Ear: Tympanic membrane and external ear normal.      Left Ear: Tympanic membrane and external ear normal.      Nose: Nose normal.      Mouth/Throat:      Mouth: Mucous membranes are moist.      Pharynx: Oropharynx is clear.   Eyes:      Conjunctiva/sclera: Conjunctivae normal.      Pupils: Pupils are equal, round, and reactive to light.   Neck:      Thyroid: No thyromegaly.   Cardiovascular:      Rate and Rhythm: Normal rate and regular rhythm.      Heart sounds: No murmur heard.    No friction rub. No gallop.   Pulmonary:      Effort: Pulmonary effort is normal. No respiratory distress.      Breath sounds: Normal breath sounds.   Abdominal:      General: Bowel sounds are normal. There is no distension.      Palpations: Abdomen is soft.      Tenderness: There is no abdominal tenderness. There is no rebound.   Musculoskeletal:         General: Normal range of motion.      Cervical back: Normal range of motion and neck supple.      Lumbar back: Deformity, spasms and bony tenderness present.   Lymphadenopathy:      Cervical: No cervical adenopathy.    Skin:     General: Skin is warm and dry.          Neurological:      General: No focal deficit present.      Mental Status: He is alert and oriented to person, place, and time.      Coordination: Coordination normal.   Psychiatric:         Attention and Perception: Attention normal.         Mood and Affect: Mood and affect normal.         Speech: Speech normal.         Behavior: Behavior normal. Behavior is cooperative.         Thought Content: Thought content normal.         Cognition and Memory: Cognition normal.         Judgment: Judgment normal.     Assessment:     1. Routine general medical examination at a health care facility    2. Benign prostatic hyperplasia without lower urinary tract symptoms    3. At risk for coronary artery disease    4. Tobacco use    5. Hyperlipidemia, unspecified hyperlipidemia type    6. Prediabetes    7. Caregiver stress    8. Spinal stenosis of lumbar region, unspecified whether neurogenic claudication present    9. DDD (degenerative disc disease), lumbar    10. Spinal stenosis, lumbosacral region    11. Personal history of tobacco use, presenting hazards to health    12. Colon cancer screening    13. Need for pneumococcal vaccination    14. Skin cancer screening      Plan:   Rafa was seen today for follow-up.    Diagnoses and all orders for this visit:    Routine general medical examination at a health care facility - labs ordered. Discussed Health Maintenance issues.     -     TSH; Future  -     Lipid Panel; Future  -     Hemoglobin A1C; Future  -     Comprehensive Metabolic Panel; Future  -     CBC Auto Differential; Future  -     Microalbumin/Creatinine Ratio, Urine; Future  -     PSA, Screening; Future    Benign prostatic hyperplasia without lower urinary tract symptoms  Comments:  Restart flomax. Refilled today.   Orders:  -     tamsulosin (FLOMAX) 0.4 mg Cap; Take 2 capsules (0.8 mg total) by mouth once daily.  -     TSH; Future  -     Lipid Panel; Future  -      Hemoglobin A1C; Future  -     Comprehensive Metabolic Panel; Future  -     CBC Auto Differential; Future  -     Microalbumin/Creatinine Ratio, Urine; Future  -     PSA, Screening; Future    At risk for coronary artery disease  -     TSH; Future  -     Lipid Panel; Future  -     Hemoglobin A1C; Future  -     Comprehensive Metabolic Panel; Future  -     CBC Auto Differential; Future  -     Microalbumin/Creatinine Ratio, Urine; Future  -     PSA, Screening; Future    Tobacco use- desires to work on quitting. Refer and start wellbutrin.   -     Ambulatory referral/consult to Smoking Cessation Program; Future  -     TSH; Future  -     Lipid Panel; Future  -     Hemoglobin A1C; Future  -     Comprehensive Metabolic Panel; Future  -     CBC Auto Differential; Future  -     Microalbumin/Creatinine Ratio, Urine; Future  -     PSA, Screening; Future  -     buPROPion (WELLBUTRIN XL) 150 MG TB24 tablet; Take 1 tablet (150 mg total) by mouth once daily.    Hyperlipidemia, unspecified hyperlipidemia type- off meds, out of rx, restart. Labs today.   -     atorvastatin (LIPITOR) 20 MG tablet; Take 1 tablet (20 mg total) by mouth once daily.  -     TSH; Future  -     Lipid Panel; Future  -     Hemoglobin A1C; Future  -     Comprehensive Metabolic Panel; Future  -     CBC Auto Differential; Future  -     Microalbumin/Creatinine Ratio, Urine; Future  -     PSA, Screening; Future    Prediabetes- labs today.   -     TSH; Future  -     Lipid Panel; Future  -     Hemoglobin A1C; Future  -     Comprehensive Metabolic Panel; Future  -     CBC Auto Differential; Future  -     Microalbumin/Creatinine Ratio, Urine; Future  -     PSA, Screening; Future    Caregiver stress- new, starting meds.   -     buPROPion (WELLBUTRIN XL) 150 MG TB24 tablet; Take 1 tablet (150 mg total) by mouth once daily.    Spinal stenosis of lumbar region, unspecified whether neurogenic claudication present-noted in the past with 2 episodes of do recommend updating  x-ray imaging as well as MRI.  Concern for possible spinal cord narrowing with straining to lift his wife at times.  Stressed importance of proper lifting techniques and follow-up based on imaging results    DDD (degenerative disc disease), lumbar-noted in the past with 2 episodes of do recommend updating x-ray imaging as well as MRI.  Concern for possible spinal cord narrowing with straining to lift his wife at times.  Stressed importance of proper lifting techniques and follow-up based on imaging results  -     X-Ray Lumbar Complete Including Flex And Ext; Future  -     MRI Lumbar Spine Without Contrast; Future    Spinal stenosis, lumbosacral region-noted in the past with 2 episodes of do recommend updating x-ray imaging as well as MRI.  Concern for possible spinal cord narrowing with straining to lift his wife at times.  Stressed importance of proper lifting techniques and follow-up based on imaging results  -     X-Ray Lumbar Complete Including Flex And Ext; Future  -     MRI Lumbar Spine Without Contrast; Future    Personal history of tobacco use, presenting hazards to health- schedule.   -     CT Chest Lung Screening Low Dose; Future    Colon cancer screening schedule   -     Ambulatory referral/consult to Endo Procedure ; Future    Need for pneumococcal vaccination  -     (In Office Administered) Pneumococcal Conjugate Vaccine (20 Valent) (IM)    Skin cancer screening  -     Ambulatory referral/consult to Dermatology; Future        Health Maintenance Due   Topic Date Due    TETANUS VACCINE  Never done    Shingles Vaccine (1 of 2) Never done    Colorectal Cancer Screening  08/20/2012    COVID-19 Vaccine (3 - Booster for Pfizer series) 09/02/2021    PROSTATE-SPECIFIC ANTIGEN  04/22/2022    Lipid Panel  04/22/2022    LDCT Lung Screen  04/27/2022    Influenza Vaccine (1) 09/01/2022       Follow up in about 3 months (around 12/15/2022) for f/u office visit Dr. Quinn/ imaging .    Patient Instructions    [facility] vaccine clinic [phone]

## 2022-09-16 LAB
COMPLEXED PSA SERPL-MCNC: 4 NG/ML (ref 0–4)
TSH SERPL DL<=0.005 MIU/L-ACNC: 1.38 UIU/ML (ref 0.4–4)

## 2022-09-19 ENCOUNTER — HOSPITAL ENCOUNTER (OUTPATIENT)
Dept: RADIOLOGY | Facility: HOSPITAL | Age: 62
Discharge: HOME | End: 2022-09-19
Attending: FAMILY MEDICINE
Payer: COMMERCIAL

## 2022-09-19 DIAGNOSIS — M51.36 DDD (DEGENERATIVE DISC DISEASE), LUMBAR: ICD-10-CM

## 2022-09-19 DIAGNOSIS — M48.07 SPINAL STENOSIS, LUMBOSACRAL REGION: ICD-10-CM

## 2022-09-19 PROCEDURE — 72114 XR LUMBAR SPINE 5 VIEW WITH FLEX AND EXT: ICD-10-PCS | Mod: 26,,, | Performed by: RADIOLOGY

## 2022-09-19 PROCEDURE — 72114 X-RAY EXAM L-S SPINE BENDING: CPT | Mod: TC

## 2022-09-19 PROCEDURE — 72114 X-RAY EXAM L-S SPINE BENDING: CPT | Mod: 26,,, | Performed by: RADIOLOGY

## 2022-09-19 NOTE — PROGRESS NOTES
Rafa,     Your lab results are within recommended goals for your age and conditions. No change in therapy is needed. Please let me know if you have further questions.    Sincerely,   Jaky Quinn MD

## 2022-09-20 ENCOUNTER — PATIENT MESSAGE (OUTPATIENT)
Dept: PRIMARY CARE CLINIC | Facility: CLINIC | Age: 62
End: 2022-09-20
Payer: COMMERCIAL

## 2022-09-20 NOTE — PROGRESS NOTES
1. Multilevel degenerative disc changes, worst at L2-L3. Several areas of arthritis noted.    Please proceed with the MRI of the back next. Jaky Quinn MD

## 2022-10-03 ENCOUNTER — PATIENT MESSAGE (OUTPATIENT)
Dept: PRIMARY CARE CLINIC | Facility: CLINIC | Age: 62
End: 2022-10-03
Payer: COMMERCIAL

## 2022-10-03 DIAGNOSIS — M25.552 LEFT HIP PAIN: Primary | ICD-10-CM

## 2022-10-03 NOTE — TELEPHONE ENCOUNTER
I have signed for the following orders AND/OR meds.  Please call the patient and ask the patient to schedule the testing AND/OR inform about any medications that were sent.      Orders Placed This Encounter   Procedures    X-Ray Hip 2 or 3 views Left (with Pelvis when performed)     Standing Status:   Future     Standing Expiration Date:   10/3/2023     Order Specific Question:   May the Radiologist modify the order per protocol to meet the clinical needs of the patient?     Answer:   Yes     Order Specific Question:   Release to patient     Answer:   Immediate

## 2022-10-19 ENCOUNTER — HOSPITAL ENCOUNTER (OUTPATIENT)
Dept: RADIOLOGY | Facility: HOSPITAL | Age: 62
Discharge: HOME | End: 2022-10-19
Attending: FAMILY MEDICINE
Payer: COMMERCIAL

## 2022-10-19 DIAGNOSIS — M51.36 DDD (DEGENERATIVE DISC DISEASE), LUMBAR: ICD-10-CM

## 2022-10-19 DIAGNOSIS — M48.07 SPINAL STENOSIS, LUMBOSACRAL REGION: ICD-10-CM

## 2022-10-19 DIAGNOSIS — M25.552 LEFT HIP PAIN: ICD-10-CM

## 2022-10-19 DIAGNOSIS — Z87.891 PERSONAL HISTORY OF TOBACCO USE, PRESENTING HAZARDS TO HEALTH: ICD-10-CM

## 2022-10-19 PROCEDURE — 71271 CT THORAX LUNG CANCER SCR C-: CPT | Mod: 26,,, | Performed by: RADIOLOGY

## 2022-10-19 PROCEDURE — 73502 X-RAY EXAM HIP UNI 2-3 VIEWS: CPT | Mod: 26,LT,, | Performed by: RADIOLOGY

## 2022-10-19 PROCEDURE — 72148 MRI LUMBAR SPINE WITHOUT CONTRAST: ICD-10-PCS | Mod: 26,,, | Performed by: RADIOLOGY

## 2022-10-19 PROCEDURE — 71271 CT THORAX LUNG CANCER SCR C-: CPT | Mod: TC

## 2022-10-19 PROCEDURE — 72148 MRI LUMBAR SPINE W/O DYE: CPT | Mod: TC

## 2022-10-19 PROCEDURE — 73502 X-RAY EXAM HIP UNI 2-3 VIEWS: CPT | Mod: TC,LT

## 2022-10-19 PROCEDURE — 73502 XR HIP WITH PELVIS WHEN PERFORMED, 2 OR 3 VIEWS LEFT: ICD-10-PCS | Mod: 26,LT,, | Performed by: RADIOLOGY

## 2022-10-19 PROCEDURE — 72148 MRI LUMBAR SPINE W/O DYE: CPT | Mod: 26,,, | Performed by: RADIOLOGY

## 2022-10-19 PROCEDURE — 71271 CT CHEST LUNG SCREENING LOW DOSE: ICD-10-PCS | Mod: 26,,, | Performed by: RADIOLOGY

## 2022-10-20 NOTE — PROGRESS NOTES
Impression:     Lung-RADS Category:  2 - Benign Appearance or Behavior - continue annual screening with LDCT in 12 months.     Clinically or potentially clinically significant non lung cancer finding:  None.     Prior Lung Cancer Modifier:  No history of prior lung cancer.

## 2022-10-20 NOTE — PROGRESS NOTES
Impression:     Multilevel multifactorial degenerative changes are present greatest at L2-L3 with moderate left neural foraminal narrowing and L3-L4 with moderate right neural foraminal narrowing.       Follow up with Pain management for the back as we discussed. Jaky Quinn MD

## 2022-10-25 ENCOUNTER — CLINICAL SUPPORT (OUTPATIENT)
Dept: SMOKING CESSATION | Facility: CLINIC | Age: 62
End: 2022-10-25

## 2022-10-25 ENCOUNTER — TELEPHONE (OUTPATIENT)
Dept: SMOKING CESSATION | Facility: CLINIC | Age: 62
End: 2022-10-25
Payer: COMMERCIAL

## 2022-10-25 DIAGNOSIS — F17.200 NICOTINE DEPENDENCE: ICD-10-CM

## 2022-10-25 PROCEDURE — 99404 PR PREVENT COUNSEL,INDIV,60 MIN: ICD-10-PCS | Mod: 95,,, | Performed by: SPEECH-LANGUAGE PATHOLOGIST

## 2022-10-25 PROCEDURE — 99404 PREV MED CNSL INDIV APPRX 60: CPT | Mod: 95,,, | Performed by: SPEECH-LANGUAGE PATHOLOGIST

## 2022-10-25 NOTE — PROGRESS NOTES
At SOC, patient reports smoking 8-10 cpd since getting on Welbutrin 1-2 weeks ago. States he was smoking 20 cpd prior to being prescribed it by his dr. Discussed the role of tobacco cessation program, role of NRT & behavioral changes to assist the patient to reach his goal of being tobacco free. The patient reports he is willing to utilize the Buproprion prescribed by his dr & behavioral therapy & will return for a follow up visit.  Education & instruction on the role of the NRT & dosage.  The patient verbalized understanding & willingness to apply. Patient instructed to call CTTS anytime. Patient states his goal is to be at 5 cigarettes or less by next session. Follow up visit set with the patient for  11/21/2022 at 11:00 am

## 2022-10-25 NOTE — TELEPHONE ENCOUNTER
Call to patient regarding his cancelled 11:00 am intake appointment for tobacco cessation. Patient states he would like to try a virtual appointment. Will rescheduled patient today for 11:00 am virtual intake

## 2022-11-21 ENCOUNTER — TELEPHONE (OUTPATIENT)
Dept: SMOKING CESSATION | Facility: CLINIC | Age: 62
End: 2022-11-21
Payer: COMMERCIAL

## 2022-11-21 NOTE — TELEPHONE ENCOUNTER
Call to patient to offer phone session. Patient states he needs to call back to reschedule due to he has to go to the airport to  some people

## 2022-11-22 ENCOUNTER — TELEPHONE (OUTPATIENT)
Dept: SMOKING CESSATION | Facility: CLINIC | Age: 62
End: 2022-11-22
Payer: COMMERCIAL

## 2022-11-28 ENCOUNTER — TELEPHONE (OUTPATIENT)
Dept: SMOKING CESSATION | Facility: CLINIC | Age: 62
End: 2022-11-28
Payer: COMMERCIAL

## 2022-11-28 NOTE — TELEPHONE ENCOUNTER
Call to the patient's mobile phone regarding missed Tobacco Cessation Program visit. Voicemail left

## 2022-11-29 ENCOUNTER — HOSPITAL ENCOUNTER (OUTPATIENT)
Dept: PREADMISSION TESTING | Facility: HOSPITAL | Age: 62
Discharge: HOME | End: 2022-11-29
Payer: COMMERCIAL

## 2022-11-29 DIAGNOSIS — Z12.11 COLON CANCER SCREENING: Primary | ICD-10-CM

## 2022-11-29 RX ORDER — POLYETHYLENE GLYCOL 3350, SODIUM SULFATE ANHYDROUS, SODIUM BICARBONATE, SODIUM CHLORIDE, POTASSIUM CHLORIDE 236; 22.74; 6.74; 5.86; 2.97 G/4L; G/4L; G/4L; G/4L; G/4L
4 POWDER, FOR SOLUTION ORAL ONCE
Qty: 4000 ML | Refills: 0 | Status: SHIPPED | OUTPATIENT
Start: 2022-11-29 | End: 2022-11-29

## 2022-11-30 ENCOUNTER — TELEPHONE (OUTPATIENT)
Dept: SMOKING CESSATION | Facility: CLINIC | Age: 62
End: 2022-11-30
Payer: COMMERCIAL

## 2022-12-05 ENCOUNTER — TELEPHONE (OUTPATIENT)
Dept: SMOKING CESSATION | Facility: CLINIC | Age: 62
End: 2022-12-05
Payer: COMMERCIAL

## 2022-12-05 NOTE — TELEPHONE ENCOUNTER
Call to the patient's home & mobile phone regarding rescheduling missed Tobacco Cessation Program visit. Voicemail left on both numbers

## 2022-12-20 ENCOUNTER — TELEPHONE (OUTPATIENT)
Dept: SMOKING CESSATION | Facility: CLINIC | Age: 62
End: 2022-12-20
Payer: COMMERCIAL

## 2022-12-20 ENCOUNTER — PATIENT MESSAGE (OUTPATIENT)
Dept: SMOKING CESSATION | Facility: CLINIC | Age: 62
End: 2022-12-20
Payer: COMMERCIAL

## 2022-12-28 ENCOUNTER — TELEPHONE (OUTPATIENT)
Dept: SMOKING CESSATION | Facility: CLINIC | Age: 62
End: 2022-12-28
Payer: COMMERCIAL

## 2022-12-28 ENCOUNTER — CLINICAL SUPPORT (OUTPATIENT)
Dept: SMOKING CESSATION | Facility: CLINIC | Age: 62
End: 2022-12-28

## 2022-12-28 DIAGNOSIS — F17.200 NICOTINE DEPENDENCE: Primary | ICD-10-CM

## 2022-12-28 PROCEDURE — 99402 PREV MED CNSL INDIV APPRX 30: CPT | Mod: S$GLB,,, | Performed by: SPEECH-LANGUAGE PATHOLOGIST

## 2022-12-28 PROCEDURE — 99999 PR PBB SHADOW E&M-EST. PATIENT-LVL II: ICD-10-PCS | Mod: PBBFAC,,, | Performed by: SPEECH-LANGUAGE PATHOLOGIST

## 2022-12-28 PROCEDURE — 99999 PR PBB SHADOW E&M-EST. PATIENT-LVL II: CPT | Mod: PBBFAC,,, | Performed by: SPEECH-LANGUAGE PATHOLOGIST

## 2022-12-28 PROCEDURE — 99402 PR PREVENT COUNSEL,INDIV,30 MIN: ICD-10-PCS | Mod: S$GLB,,, | Performed by: SPEECH-LANGUAGE PATHOLOGIST

## 2022-12-28 RX ORDER — IBUPROFEN 200 MG
1 TABLET ORAL DAILY
Qty: 28 PATCH | Refills: 0 | Status: SHIPPED | OUTPATIENT
Start: 2022-12-28 | End: 2024-02-20 | Stop reason: ALTCHOICE

## 2022-12-28 NOTE — PROGRESS NOTES
Individual Follow-Up Form    12/28/2022    Quit Date: TBD    Clinical Status of Patient: Outpatient    Continuing Medication: yes BUPROPION PRESCRIBED BY HIS DR    Other Medications: ordered 14 mg patches this date      Target Symptoms: Withdrawal and medication side effects. The following were  rated moderate (3) to severe (4) on TCRS:  Moderate (3): anger/irritable/frustration; desire/crave; depression mood/sad; difficulty concentration;   Severe (4): insomnia    Comments: DELAY WITH GETTING PATIENT LOADED ON THE SCHEDULE. Telephone visit. Patient reports he continues to smoke 8-10 cpd. At time of this call, patient has smoked 2 cigarettes. He reports he takes Bupropion that is prescribed by his Dr. Discussed adding additional NRT to the patient's regimen such a nicotine patch & patient agreed to 14 mg patches being ordered. Administered TCRS with patient reporting moderate anger/irritable/frustration; desire/crave; depression mood/sad; difficulty concentrating & severe symptoms of insomnia. Discussed with patient possible side effects of Bupropion which is prescribed by his Dr to discuss. Provided the patient with a copy of the TCRS via text for him to reference to monitor symptoms. Discussed s/s of nicotine withdrawal. Discussed patient's smoking behaviors & patterns. Discussed alternative behaviors & strategies for the patient to apply to include chewing a piece of gum after a meal & extending his time to not smoke after a meal, moving his pack of cigarettes & smoking location & utilizing his patches that were ordered today. Discussed aggressive daily tapering as well as informed patient that he would be considered a former smoker with a 24 hour quit. Discussed starting his quit today to enter the new year as a non smoker. Patient states his goal is to be smoke free by next session. Follow up set up for 1/18/2022 at 4:00 pm.     Diagnosis: F17.200    Next Visit: 3 weeks

## 2023-01-03 ENCOUNTER — ANESTHESIA EVENT (OUTPATIENT)
Dept: ENDOSCOPY | Facility: HOSPITAL | Age: 63
End: 2023-01-03
Payer: COMMERCIAL

## 2023-01-03 NOTE — ANESTHESIA PREPROCEDURE EVALUATION
01/03/2023  Rafa Padilla is a 62 y.o., male.  Past Medical History:   Diagnosis Date    Anxiety     BPH (benign prostatic hyperplasia)     Degenerative disc disease     Hyperlipidemia     Prediabetes     Spinal stenosis, lumbar     Tobacco use    History reviewed. No pertinent surgical history.  No current facility-administered medications for this encounter.    Current Outpatient Medications:     aspirin 81 mg Tab, Take by mouth. 1 Tablet Oral Every day, Disp: , Rfl:     atorvastatin (LIPITOR) 20 MG tablet, Take 1 tablet (20 mg total) by mouth once daily., Disp: 90 tablet, Rfl: 3    buPROPion (WELLBUTRIN XL) 150 MG TB24 tablet, Take 1 tablet (150 mg total) by mouth once daily., Disp: 90 tablet, Rfl: 3    nicotine (NICODERM CQ) 14 mg/24 hr, Place 1 patch onto the skin once daily., Disp: 28 patch, Rfl: 0    sildenafil (VIAGRA) 100 MG tablet, 1/2 or 1 tab po q 24 hours prn sexual activity (Patient not taking: Reported on 10/25/2022), Disp: 15 tablet, Rfl: 1    tamsulosin (FLOMAX) 0.4 mg Cap, Take 2 capsules (0.8 mg total) by mouth once daily., Disp: 180 capsule, Rfl: 3        Pre-op Assessment    I have reviewed the Patient Summary Reports.     I have reviewed the Nursing Notes. I have reviewed the NPO Status.   I have reviewed the Medications.     Review of Systems  Anesthesia Hx:  No problems with previous Anesthesia  Neg history of prior surgery. Denies Family Hx of Anesthesia complications.   Denies Personal Hx of Anesthesia complications.   Social:  Social Alcohol Use, Smoker    Hematology/Oncology:  Hematology Normal   Oncology Normal     EENT/Dental:EENT/Dental Normal   Cardiovascular:   Hypertension Denies MI.  Denies CAD.     Denies Angina. hyperlipidemia    Renal/:  Renal/ Normal     Hepatic/GI:   Bowel Prep.    Musculoskeletal:   Arthritis     Neurological:  Neurology Normal     Endocrine:   Prediabetes  Obesity / BMI > 30  Dermatological:  Skin Normal    Psych:   Psychiatric History anxiety          Physical Exam  General: Well nourished, Cooperative, Alert and Oriented    Airway:  Mallampati: I   Mouth Opening: Normal  TM Distance: Normal  Tongue: Normal  Neck ROM: Normal ROM    Dental:  Intact    Chest/Lungs:  Clear to auscultation, Normal Respiratory Rate    Heart:  Rate: Normal  Rhythm: Regular Rhythm        Anesthesia Plan  Type of Anesthesia, risks & benefits discussed:    Anesthesia Type: Gen Natural Airway  Intra-op Monitoring Plan: Standard ASA Monitors  Post Op Pain Control Plan: multimodal analgesia  Induction:  IV  Informed Consent: Informed consent signed with the Patient and all parties understand the risks and agree with anesthesia plan.  All questions answered. Patient consented to blood products? No  ASA Score: 2  Day of Surgery Review of History & Physical: H&P Update referred to the surgeon/provider.    Ready For Surgery From Anesthesia Perspective.     .

## 2023-01-04 ENCOUNTER — HOSPITAL ENCOUNTER (OUTPATIENT)
Facility: HOSPITAL | Age: 63
Discharge: HOME | End: 2023-01-04
Attending: INTERNAL MEDICINE | Admitting: INTERNAL MEDICINE
Payer: COMMERCIAL

## 2023-01-04 ENCOUNTER — ANESTHESIA (OUTPATIENT)
Dept: ENDOSCOPY | Facility: HOSPITAL | Age: 63
End: 2023-01-04
Payer: COMMERCIAL

## 2023-01-04 VITALS
WEIGHT: 216.19 LBS | TEMPERATURE: 98 F | HEART RATE: 49 BPM | SYSTOLIC BLOOD PRESSURE: 154 MMHG | OXYGEN SATURATION: 100 % | RESPIRATION RATE: 17 BRPM | BODY MASS INDEX: 29.28 KG/M2 | HEIGHT: 72 IN | DIASTOLIC BLOOD PRESSURE: 88 MMHG

## 2023-01-04 PROCEDURE — D9220A PRA ANESTHESIA: Mod: 33,,, | Performed by: NURSE ANESTHETIST, CERTIFIED REGISTERED

## 2023-01-04 PROCEDURE — 27201028 HC NEEDLE, SCLERO: Performed by: INTERNAL MEDICINE

## 2023-01-04 PROCEDURE — 37000008 HC ANESTHESIA 1ST 15 MINUTES: Performed by: INTERNAL MEDICINE

## 2023-01-04 PROCEDURE — 45385 COLONOSCOPY W/LESION REMOVAL: CPT | Mod: PT,59 | Performed by: INTERNAL MEDICINE

## 2023-01-04 PROCEDURE — 45385 COLONOSCOPY W/LESION REMOVAL: CPT | Mod: 33,59,, | Performed by: INTERNAL MEDICINE

## 2023-01-04 PROCEDURE — 45390 COLONOSCOPY W/RESECTION: CPT | Mod: PT | Performed by: INTERNAL MEDICINE

## 2023-01-04 PROCEDURE — 63600175 PHARM REV CODE 636 W HCPCS: Performed by: NURSE ANESTHETIST, CERTIFIED REGISTERED

## 2023-01-04 PROCEDURE — 45385 PR COLONOSCOPY,REMV LESN,SNARE: ICD-10-PCS | Mod: 33,59,, | Performed by: INTERNAL MEDICINE

## 2023-01-04 PROCEDURE — 88305 TISSUE EXAM BY PATHOLOGIST: CPT | Performed by: PATHOLOGY

## 2023-01-04 PROCEDURE — 25000003 PHARM REV CODE 250: Performed by: NURSE ANESTHETIST, CERTIFIED REGISTERED

## 2023-01-04 PROCEDURE — 88305 TISSUE EXAM BY PATHOLOGIST: ICD-10-PCS | Mod: 26,,, | Performed by: PATHOLOGY

## 2023-01-04 PROCEDURE — 37000009 HC ANESTHESIA EA ADD 15 MINS: Performed by: INTERNAL MEDICINE

## 2023-01-04 PROCEDURE — 27201089 HC SNARE, DISP (ANY): Performed by: INTERNAL MEDICINE

## 2023-01-04 PROCEDURE — D9220A PRA ANESTHESIA: ICD-10-PCS | Mod: 33,,, | Performed by: NURSE ANESTHETIST, CERTIFIED REGISTERED

## 2023-01-04 PROCEDURE — 45390 COLONOSCOPY W/RESECTION: CPT | Mod: 33,,, | Performed by: INTERNAL MEDICINE

## 2023-01-04 PROCEDURE — 45390: ICD-10-PCS | Mod: 33,,, | Performed by: INTERNAL MEDICINE

## 2023-01-04 PROCEDURE — 63600175 PHARM REV CODE 636 W HCPCS: Performed by: INTERNAL MEDICINE

## 2023-01-04 PROCEDURE — 27202363 HC INJECTION AGENT, SUBMUCOSAL, ANY: Performed by: INTERNAL MEDICINE

## 2023-01-04 PROCEDURE — 88305 TISSUE EXAM BY PATHOLOGIST: CPT | Mod: 26,,, | Performed by: PATHOLOGY

## 2023-01-04 RX ORDER — PROPOFOL 10 MG/ML
VIAL (ML) INTRAVENOUS
Status: DISCONTINUED | OUTPATIENT
Start: 2023-01-04 | End: 2023-01-04

## 2023-01-04 RX ORDER — SODIUM CHLORIDE, SODIUM LACTATE, POTASSIUM CHLORIDE, CALCIUM CHLORIDE 600; 310; 30; 20 MG/100ML; MG/100ML; MG/100ML; MG/100ML
INJECTION, SOLUTION INTRAVENOUS CONTINUOUS
Status: DISCONTINUED | OUTPATIENT
Start: 2023-01-04 | End: 2023-01-06 | Stop reason: HOSPADM

## 2023-01-04 RX ORDER — LIDOCAINE HYDROCHLORIDE 20 MG/ML
INJECTION, SOLUTION EPIDURAL; INFILTRATION; INTRACAUDAL; PERINEURAL
Status: DISCONTINUED | OUTPATIENT
Start: 2023-01-04 | End: 2023-01-04

## 2023-01-04 RX ADMIN — PROPOFOL 20 MG: 10 INJECTION, EMULSION INTRAVENOUS at 12:01

## 2023-01-04 RX ADMIN — SODIUM CHLORIDE, POTASSIUM CHLORIDE, SODIUM LACTATE AND CALCIUM CHLORIDE: 600; 310; 30; 20 INJECTION, SOLUTION INTRAVENOUS at 10:01

## 2023-01-04 RX ADMIN — PROPOFOL 30 MG: 10 INJECTION, EMULSION INTRAVENOUS at 12:01

## 2023-01-04 RX ADMIN — PROPOFOL 100 MG: 10 INJECTION, EMULSION INTRAVENOUS at 11:01

## 2023-01-04 RX ADMIN — LIDOCAINE HYDROCHLORIDE 40 MG: 20 INJECTION, SOLUTION EPIDURAL; INFILTRATION; INTRACAUDAL; PERINEURAL at 11:01

## 2023-01-04 RX ADMIN — PROPOFOL 10 MG: 10 INJECTION, EMULSION INTRAVENOUS at 12:01

## 2023-01-04 NOTE — ANESTHESIA POSTPROCEDURE EVALUATION
Anesthesia Post Evaluation    Patient: Rafa Padilla    Procedure(s) Performed: Procedure(s) (LRB):  COLONOSCOPY (N/A)    Final Anesthesia Type: general      Patient location during evaluation: PACU  Patient participation: Yes- Able to Participate  Level of consciousness: awake and alert and oriented  Post-procedure vital signs: reviewed and stable  Pain management: adequate  Airway patency: patent    PONV status at discharge: No PONV  Anesthetic complications: no      Cardiovascular status: blood pressure returned to baseline, stable and hemodynamically stable  Respiratory status: unassisted  Hydration status: euvolemic  Follow-up not needed.          Vitals Value Taken Time   /88 01/04/23 1255     01/04/23 1259   Pulse 49 01/04/23 1255   Resp 17 01/04/23 1255   SpO2 100 % 01/04/23 1255         Event Time   Out of Recovery 12:57:09         Pain/Brigitte Score: Brigitte Score: 10 (1/4/2023 12:55 PM)

## 2023-01-04 NOTE — TRANSFER OF CARE
Anesthesia Transfer of Care Note    Patient: Rafa Padilla    Procedure(s) Performed: Procedure(s) (LRB):  COLONOSCOPY (N/A)    Patient location: PACU    Anesthesia Type: general    Transport from OR: Transported from OR on room air with adequate spontaneous ventilation    Post pain: adequate analgesia    Post assessment: no apparent anesthetic complications and tolerated procedure well    Post vital signs: stable    Level of consciousness: sedated    Nausea/Vomiting: no nausea/vomiting    Complications: none    Transfer of care protocol was followed      Last vitals:   Visit Vitals  /77 (BP Location: Right arm, Patient Position: Lying)   Pulse (!) 46   Temp 36.6 °C (97.8 °F) (Temporal)   Resp 18   Ht 6' (1.829 m)   Wt 98.1 kg (216 lb 2.6 oz)   SpO2 99%   BMI 29.32 kg/m²

## 2023-01-04 NOTE — PLAN OF CARE
Discharge instructions reviewed with pt and family, handouts given, verbalized understanding with no further questions at this time. Dr. Fermin spoke to pt at bedside, reviewed procedure and answered questions, MD telephone number provided per AVS sheet. No pain or nausea noted, tolerating po fluids without difficulty, no other complaints noted. Fall precautions reviewed, consents in chart, PIV to be removed at discharge.

## 2023-01-04 NOTE — H&P
PRE PROCEDURE H&P    Patient Name: Rafa Padilla  MRN: [id number]  : 1960  Date of Procedure:  2023  Referring Physician: Jaky Quinn MD  Primary Physician: Jaky Quinn MD  Procedure Physician: Chintan Fermin MD       Planned Procedure: Colonoscopy  Diagnosis: family history of colon cancer  Chief Complaint: Same as above    HPI: Patient is an 62 y.o. male is here for the above.     Last colonoscopy: 12 years ago  Family history: sister had colon cancer, nephew (different sister) had colon cancer  Anticoagulation: none    Past Medical History:   Past Medical History:   Diagnosis Date    Anxiety     BPH (benign prostatic hyperplasia)     Degenerative disc disease     Hyperlipidemia     Prediabetes     Spinal stenosis, lumbar     Tobacco use         Past Surgical History:  History reviewed. No pertinent surgical history.     Home Medications:  Prior to Admission medications    Medication Sig Start Date End Date Taking? Authorizing Provider   aspirin 81 mg Tab Take by mouth. 1 Tablet Oral Every day   Yes Historical Provider   atorvastatin (LIPITOR) 20 MG tablet Take 1 tablet (20 mg total) by mouth once daily. 9/15/22  Yes Jaky Quinn MD   buPROPion (WELLBUTRIN XL) 150 MG TB24 tablet Take 1 tablet (150 mg total) by mouth once daily. 9/15/22  Yes Jaky Quinn MD   tamsulosin (FLOMAX) 0.4 mg Cap Take 2 capsules (0.8 mg total) by mouth once daily. 9/15/22  Yes Jaky Quinn MD   nicotine (NICODERM CQ) 14 mg/24 hr Place 1 patch onto the skin once daily. 22   Aysha Ramirez MD   sildenafil (VIAGRA) 100 MG tablet 1/2 or 1 tab po q 24 hours prn sexual activity  Patient not taking: Reported on 10/25/2022 1/21/19   Da Weston MD        Allergies:  Review of patient's allergies indicates:  No Known Allergies     Social History:   Social History     Socioeconomic History    Marital status:      Spouse name: Aurora    Number of children: 2   Occupational History      Employer: [employer]   Tobacco Use    Smoking status: Every Day     Packs/day: 0.50     Years: 40.00     Pack years: 20.00     Types: Cigarettes    Smokeless tobacco: Never   Substance and Sexual Activity    Alcohol use: Yes     Comment: occasionally    Drug use: No    Sexual activity: Yes     Partners: Female     Birth control/protection: None       Family History:  Family History   Problem Relation Age of Onset    Arthritis Mother     COPD Mother     Diabetes Mother     Heart disease Mother     Hypertension Mother     No Known Problems Father        ROS: No acute cardiac events, no acute respiratory complaints.     Physical Exam (all patients):    BP (!) 191/90 (BP Location: Right arm, Patient Position: Sitting)   Pulse 65   Temp 97.8 °F (36.6 °C) (Temporal)   Resp 18   Ht 6' (1.829 m)   Wt 98.1 kg (216 lb 2.6 oz)   SpO2 97%   BMI 29.32 kg/m²   Lungs: Clear to auscultation bilaterally, respirations unlabored  Heart: Regular rate and rhythm, S1 and S2 normal, no obvious murmurs  Abdomen:         Soft, non-tender, bowel sounds normal, no masses, no organomegaly    Lab Results   Component Value Date    WBC 7.25 09/15/2022    MCV 93 09/15/2022    RDW 12.8 09/15/2022     09/15/2022     09/15/2022    HGBA1C 6.1 (H) 09/15/2022    BUN 16 09/15/2022     09/15/2022    K 4.4 09/15/2022     09/15/2022        SEDATION PLAN: per anesthesia      History reviewed, vital signs satisfactory, cardiopulmonary status satisfactory, sedation options, risks and plans have been discussed with the patient  All their questions were answered and the patient agrees to the sedation procedures as planned and the patient is deemed an appropriate candidate for the sedation as planned.    Procedure explained to patient, informed consent obtained and placed in chart.    Chintan Fermin  1/4/2023  11:55 AM

## 2023-01-04 NOTE — PROVATION PATIENT INSTRUCTIONS
Discharge Summary/Instructions after an Endoscopic Procedure  Patient Name: Rafa Padilla  Patient MRN: [id number]  Patient YOB: 1960 Wednesday, January 4, 2023  Chintan Fermin MD  Dear patient,  As a result of recent federal legislation (The Federal Cures Act), you may   receive lab or pathology results from your procedure in your [facility]   account before your physician is able to contact you. Your physician or   their representative will relay the results to you with their   recommendations at their soonest availability.  Thank you,  RESTRICTIONS:  During your procedure today, you received medications for sedation.  These   medications may affect your judgment, balance and coordination.  Therefore,   for 24 hours, you have the following restrictions:   - DO NOT drive a car, operate machinery, make legal/financial decisions,   sign important papers or drink alcohol.    ACTIVITY:  Today: no heavy lifting, straining or running due to procedural   sedation/anesthesia.  The following day: return to full activity including work.  DIET:  Eat and drink normally unless instructed otherwise.     TREATMENT FOR COMMON SIDE EFFECTS:  - Mild abdominal pain, nausea, belching, bloating or excessive gas:  rest,   eat lightly and use a heating pad.  - Sore Throat: treat with throat lozenges and/or gargle with warm salt   water.  - Because air was used during the procedure, expelling large amounts of air   from your rectum or belching is normal.  - If a bowel prep was taken, you may not have a bowel movement for 1-3 days.    This is normal.  SYMPTOMS TO WATCH FOR AND REPORT TO YOUR PHYSICIAN:  1. Abdominal pain or bloating, other than gas cramps.  2. Chest pain.  3. Back pain.  4. Signs of infection such as: chills or fever occurring within 24 hours   after the procedure.  5. Rectal bleeding, which would show as bright red, maroon, or black stools.   (A tablespoon of blood from the rectum is not serious,  especially if   hemorrhoids are present.)  6. Vomiting.  7. Weakness or dizziness.  GO DIRECTLY TO THE NEAREST EMERGENCY ROOM IF YOU HAVE ANY OF THE FOLLOWING:      Difficulty breathing              Chills and/or fever over 101 F   Persistent vomiting and/or vomiting blood   Severe abdominal pain   Severe chest pain   Black, tarry stools   Bleeding- more than one tablespoon   Any other symptom or condition that you feel may need urgent attention  Your doctor recommends these additional instructions:  If any biopsies were taken, your doctors clinic will contact you in 1 to 2   weeks with any results.  - Discharge patient to home.   - Resume previous diet.   - Continue present medications.   - Await pathology results.   - Repeat colonoscopy in 1 year because the bowel preparation was   suboptimal.  For questions, problems or results please call your physician Chintan Fermin MD at Work:  [phone]  If you have any questions about the above instructions, call the GI   department at [phone] or call the endoscopy unit at [phone]   from 7am until 3 pm.  [facility] [place], EMERGENCY ROOM PHONE NUMBER:   [phone]  IF A COMPLICATION OR EMERGENCY SITUATION ARISES AND YOU ARE UNABLE TO REACH   YOUR PHYSICIAN - GO DIRECTLY TO THE EMERGENCY ROOM.  I have read or have had read to me these discharge instructions for my   procedure and have received a written copy.  I understand these   instructions and will follow-up with my physician if I have any questions.     __________________________________       _____________________________________  Nurse Signature                                          Patient/Designated   Responsible Party Signature  MD Chintan Shine MD  1/4/2023 12:46:15 PM  This report has been verified and signed electronically.  Dear patient,  As a result of recent federal legislation (The Federal Cures Act), you may    receive lab or pathology results from your procedure in your [facility]   account before your physician is able to contact you. Your physician or   their representative will relay the results to you with their   recommendations at their soonest availability.  Thank you,  PROVATION

## 2023-01-10 LAB
FINAL PATHOLOGIC DIAGNOSIS: NORMAL
Lab: NORMAL

## 2023-01-17 ENCOUNTER — TELEPHONE (OUTPATIENT)
Dept: PRIMARY CARE CLINIC | Facility: CLINIC | Age: 63
End: 2023-01-17
Payer: COMMERCIAL

## 2023-01-17 NOTE — TELEPHONE ENCOUNTER
----- Message from Diego Moon sent at 1/17/2023 11:11 AM CST -----  Contact: Rafa Ferrari is requesting a call back in regards to his colonoscopy result. Please call him back at [phone]        Thanks  CF

## 2023-01-18 ENCOUNTER — TELEPHONE (OUTPATIENT)
Dept: SMOKING CESSATION | Facility: CLINIC | Age: 63
End: 2023-01-18
Payer: COMMERCIAL

## 2023-01-18 NOTE — TELEPHONE ENCOUNTER
Phoned patient to advise colonscopy results.  Left voice mail for patient to return call for results.

## 2023-01-31 NOTE — TELEPHONE ENCOUNTER
Phoned patient and advised colonoscopy results and the recommended follow up.  He verbalized understanding.

## 2023-02-01 ENCOUNTER — CLINICAL SUPPORT (OUTPATIENT)
Dept: SMOKING CESSATION | Facility: CLINIC | Age: 63
End: 2023-02-01

## 2023-02-01 DIAGNOSIS — F17.200 NICOTINE DEPENDENCE: Primary | ICD-10-CM

## 2023-02-01 PROCEDURE — 99999 PR PBB SHADOW E&M-EST. PATIENT-LVL I: CPT | Mod: PBBFAC,,,

## 2023-02-01 PROCEDURE — 99407 PR TOBACCO USE CESSATION INTENSIVE >10 MINUTES: ICD-10-PCS | Mod: S$GLB,,,

## 2023-02-01 PROCEDURE — 99407 BEHAV CHNG SMOKING > 10 MIN: CPT | Mod: S$GLB,,,

## 2023-02-01 PROCEDURE — 99999 PR PBB SHADOW E&M-EST. PATIENT-LVL I: ICD-10-PCS | Mod: PBBFAC,,,

## 2023-02-01 NOTE — PROGRESS NOTES
Called pt to f/u on his 3 month smoking cessation quit status. Pt stated he is still smoking, reports quitting for 5 days, but then went back. Informed him of benefit period, phone follow ups, and contact information. Will complete smart form and will continue to follow up on quit #1 episode.

## 2023-02-02 ENCOUNTER — PATIENT MESSAGE (OUTPATIENT)
Dept: GASTROENTEROLOGY | Facility: CLINIC | Age: 63
End: 2023-02-02
Payer: COMMERCIAL

## 2023-02-14 ENCOUNTER — HOSPITAL ENCOUNTER (OUTPATIENT)
Dept: RADIOLOGY | Facility: HOSPITAL | Age: 63
Discharge: HOME | End: 2023-02-14
Attending: NURSE PRACTITIONER
Payer: COMMERCIAL

## 2023-02-14 ENCOUNTER — TELEPHONE (OUTPATIENT)
Dept: INTERNAL MEDICINE | Facility: CLINIC | Age: 63
End: 2023-02-14
Payer: COMMERCIAL

## 2023-02-14 ENCOUNTER — OFFICE VISIT (OUTPATIENT)
Dept: INTERNAL MEDICINE | Facility: CLINIC | Age: 63
End: 2023-02-14
Payer: COMMERCIAL

## 2023-02-14 VITALS
RESPIRATION RATE: 16 BRPM | SYSTOLIC BLOOD PRESSURE: 156 MMHG | WEIGHT: 219.81 LBS | HEIGHT: 72 IN | OXYGEN SATURATION: 99 % | TEMPERATURE: 98 F | BODY MASS INDEX: 29.77 KG/M2 | HEART RATE: 75 BPM | DIASTOLIC BLOOD PRESSURE: 88 MMHG

## 2023-02-14 DIAGNOSIS — R03.0 ELEVATED BLOOD PRESSURE READING IN OFFICE WITHOUT DIAGNOSIS OF HYPERTENSION: ICD-10-CM

## 2023-02-14 DIAGNOSIS — M25.551 ACUTE RIGHT HIP PAIN: Primary | ICD-10-CM

## 2023-02-14 DIAGNOSIS — Z72.0 NICOTINE ABUSE: ICD-10-CM

## 2023-02-14 DIAGNOSIS — M48.061 SPINAL STENOSIS OF LUMBAR REGION, UNSPECIFIED WHETHER NEUROGENIC CLAUDICATION PRESENT: ICD-10-CM

## 2023-02-14 DIAGNOSIS — M51.36 DDD (DEGENERATIVE DISC DISEASE), LUMBAR: Chronic | ICD-10-CM

## 2023-02-14 DIAGNOSIS — M25.551 ACUTE RIGHT HIP PAIN: ICD-10-CM

## 2023-02-14 DIAGNOSIS — R53.81 PHYSICAL DECONDITIONING: ICD-10-CM

## 2023-02-14 PROCEDURE — 73502 X-RAY EXAM HIP UNI 2-3 VIEWS: CPT | Mod: TC,RT

## 2023-02-14 PROCEDURE — 99999 PR PBB SHADOW E&M-EST. PATIENT-LVL V: ICD-10-PCS | Mod: PBBFAC,,, | Performed by: NURSE PRACTITIONER

## 2023-02-14 PROCEDURE — 73502 X-RAY EXAM HIP UNI 2-3 VIEWS: CPT | Mod: 26,RT,, | Performed by: RADIOLOGY

## 2023-02-14 PROCEDURE — 99215 PR OFFICE/OUTPT VISIT, EST, LEVL V, 40-54 MIN: ICD-10-PCS | Mod: S$GLB,,, | Performed by: NURSE PRACTITIONER

## 2023-02-14 PROCEDURE — 73502 XR HIP WITH PELVIS WHEN PERFORMED, 2 OR 3  VIEWS RIGHT: ICD-10-PCS | Mod: 26,RT,, | Performed by: RADIOLOGY

## 2023-02-14 PROCEDURE — 99999 PR PBB SHADOW E&M-EST. PATIENT-LVL V: CPT | Mod: PBBFAC,,, | Performed by: NURSE PRACTITIONER

## 2023-02-14 PROCEDURE — 99215 OFFICE O/P EST HI 40 MIN: CPT | Mod: S$GLB,,, | Performed by: NURSE PRACTITIONER

## 2023-02-14 RX ORDER — IBUPROFEN 600 MG/1
600 TABLET ORAL 3 TIMES DAILY PRN
Qty: 90 TABLET | Refills: 1 | Status: SHIPPED | OUTPATIENT
Start: 2023-02-14 | End: 2023-04-17 | Stop reason: SDUPTHER

## 2023-02-14 NOTE — TELEPHONE ENCOUNTER
We discussed Xray results  I explained surgical clip seen is likely from recent polyp removal with clip placement.  3. I advised to follow-up with pain management as planned.      Hernando Kraft, NP

## 2023-02-14 NOTE — PROGRESS NOTES
HPI     Chief Complaint  Chief Complaint   Patient presents with    Back Pain    Hip Pain       HPI  Rafa Padilla is a 62 y.o. male with multiple medical diagnoses as listed in the medical history and problem list that presents for Acute Right Hip Pain/Chronic Low Back Pain.  This patient is new to me.   Acute Right Hip Pain/Chronic Low Back Pain: hx of DDD and spinal stenosis at the lumbar spine found on 09/2022 MRI. XRay of left hip unremarkable. He has not visited with pain management as advised at last visit. He reports a fall that prompt previous visit for left hip xray. He had fall while attempting to help his wife carry out ADLS. Wife has brittany's disease and is experiencing severe symptoms. He reports today with right hip pain. No new falls or trauma. He realizes he is not using proper body mechanics when assisting his wife. Exacerbating factors are standing long periods of time. Rates pain 9/10 when standing. Able to decrease to 6/10 after a while. Pain often radiates down to right foot. Takes [id number] mg as needed that usually takes the edge off.      2. Elevated Blood Pressure Reading in Office without a Diagnosis of HTN: Eats Boudin, red meats and crackling regularly. Also a smoker. Currently in Pain. Reports he knows he is not suppose eat a lot of the pork and highly seasoned. Actively engaged in smoking cessation program. No hypertensives onboard     History     PAST MEDICAL HISTORY:  Past Medical History:   Diagnosis Date    Anxiety     BPH (benign prostatic hyperplasia)     Degenerative disc disease     Hyperlipidemia     Prediabetes     Spinal stenosis, lumbar     Tobacco use        PAST SURGICAL HISTORY:  History reviewed. No pertinent surgical history.    SOCIAL HISTORY:  Social History     Socioeconomic History    Marital status:      Spouse name: Aurora    Number of children: 2   Occupational History     Employer: [employer]   Tobacco Use    Smoking status:  Every Day     Packs/day: 0.50     Years: 40.00     Pack years: 20.00     Types: Cigarettes    Smokeless tobacco: Never   Substance and Sexual Activity    Alcohol use: Yes     Comment: occasionally    Drug use: No    Sexual activity: Yes     Partners: Female     Birth control/protection: None       FAMILY HISTORY:  Family History   Problem Relation Age of Onset    Arthritis Mother     COPD Mother     Diabetes Mother     Heart disease Mother     Hypertension Mother     No Known Problems Father        ALLERGIES AND MEDICATIONS: updated and reviewed.  Review of patient's allergies indicates:  No Known Allergies  Current Outpatient Medications   Medication Sig Dispense Refill    aspirin 81 mg Tab Take by mouth. 1 Tablet Oral Every day      atorvastatin (LIPITOR) 20 MG tablet Take 1 tablet (20 mg total) by mouth once daily. 90 tablet 3    tamsulosin (FLOMAX) 0.4 mg Cap Take 2 capsules (0.8 mg total) by mouth once daily. 180 capsule 3    buPROPion (WELLBUTRIN XL) 150 MG TB24 tablet Take 1 tablet (150 mg total) by mouth once daily. (Patient not taking: Reported on 2/14/2023) 90 tablet 3    ibuprofen (ADVIL,MOTRIN) 600 MG tablet Take 1 tablet (600 mg total) by mouth 3 (three) times daily as needed for Pain. 90 tablet 1    nicotine (NICODERM CQ) 14 mg/24 hr Place 1 patch onto the skin once daily. (Patient not taking: Reported on 2/14/2023) 28 patch 0    sildenafil (VIAGRA) 100 MG tablet 1/2 or 1 tab po q 24 hours prn sexual activity (Patient not taking: Reported on 2/14/2023) 15 tablet 1     No current facility-administered medications for this visit.       Exam     ROS  Review of Systems   Constitutional:  Negative for appetite change, chills, fatigue and fever.   HENT:  Negative for congestion, ear pain, postnasal drip, rhinorrhea, sneezing, sore throat and tinnitus.    Respiratory:  Negative for cough and shortness of breath.    Cardiovascular:  Negative for chest pain and palpitations.   Gastrointestinal:  Negative for  abdominal pain, constipation, diarrhea, nausea and vomiting.   Genitourinary:  Negative for difficulty urinating and dysuria.   Musculoskeletal:  Positive for arthralgias, back pain and gait problem.   Neurological:  Negative for headaches.   Psychiatric/Behavioral:  Negative for sleep disturbance.          Physical Exam  Vitals:    02/14/23 1031 02/14/23 1055   BP: (!) 160/98 (!) 156/88   BP Location: Right arm    Patient Position: Sitting    BP Method: Large (Manual)    Pulse: 75    Resp: 16    Temp: 98.2 °F (36.8 °C)    TempSrc: Tympanic    SpO2: 99%    Weight: 99.7 kg (219 lb 12.8 oz)    Height: 6' (1.829 m)     Body mass index is 29.81 kg/m².  Weight: 99.7 kg (219 lb 12.8 oz)   Height: 6' (182.9 cm)   Physical Exam  Constitutional:       General: He is not in acute distress.     Appearance: Normal appearance. He is well-developed.   HENT:      Head: Normocephalic and atraumatic.      Right Ear: External ear normal.      Left Ear: External ear normal.      Nose: Nose normal.      Mouth/Throat:      Pharynx: No oropharyngeal exudate.   Eyes:      Pupils: Pupils are equal, round, and reactive to light.   Cardiovascular:      Rate and Rhythm: Normal rate and regular rhythm.      Pulses: Normal pulses.      Heart sounds: No murmur heard.    No friction rub. No gallop.   Pulmonary:      Effort: Pulmonary effort is normal. No respiratory distress.      Breath sounds: Normal breath sounds. No wheezing.   Abdominal:      General: Bowel sounds are normal.      Palpations: Abdomen is soft.   Musculoskeletal:      Cervical back: Neck supple.      Lumbar back: Spasms and tenderness present.      Right hip: Tenderness present. Decreased range of motion.   Lymphadenopathy:      Cervical: No cervical adenopathy.   Skin:     General: Skin is warm and dry.   Neurological:      Mental Status: He is alert and oriented to person, place, and time.   Psychiatric:         Mood and Affect: Mood normal.         Health Maintenance          Date Due Completion Date    TETANUS VACCINE Never done ---    Shingles Vaccine (1 of 2) Never done ---    COVID-19 Vaccine (3 - Booster for Pfizer series) 05/28/2021 4/2/2021    Influenza Vaccine (1) 09/01/2022 12/3/2019    PROSTATE-SPECIFIC ANTIGEN 09/15/2023 9/15/2022    Hemoglobin A1c (Prediabetes) 09/15/2023 9/15/2022    Lipid Panel 09/15/2023 9/15/2022    LDCT Lung Screen 10/19/2023 10/19/2022    Colorectal Cancer Screening 01/04/2024 1/4/2023            Assessment & Plan     Assessment & Plan  Problem List Items Addressed This Visit          Neuro    DDD (degenerative disc disease), lumbar (Chronic)  - See right hip pain    Relevant Medications    ibuprofen (ADVIL,MOTRIN) 600 MG tablet, TID PRN    Other Relevant Orders    Ambulatory referral/consult to Pain Clinic    Ambulatory referral/consult to Physical/Occupational Therapy    Spinal stenosis, lumbar  - See right hip pain    Relevant Medications    ibuprofen (ADVIL,MOTRIN) 600 MG tablet, TID PRN    Other Relevant Orders    Ambulatory referral/consult to Pain Clinic    Ambulatory referral/consult to Physical/Occupational Therapy     Other Visit Diagnoses       Acute right hip pain    -  Primary  - Ibuprofen 600 mg TID PRN with water and meals    Relevant Orders    Ambulatory referral/consult to Physical/Occupational Therapy    X-Ray Hip 2 or 3 views Right (with Pelvis when performed) (Completed)    Elevated blood pressure reading in office without diagnosis of hypertension      - We discussed decreasing salt in diet, decreasing red meats, increasing water intake  - Discussed DASH  dieting  - Start on first line Hypertensive in B/P elevated in 2 weeks    Physical deconditioning        Relevant Orders    Ambulatory referral/consult to Physical/Occupational Therapy    Nicotine abuse      - Follow by Smoking cessation              Health Maintenance reviewed: Deferred per patient    Follow-up: 6 months for Chronic conditions or sooner if needed     40+ minutes  of total time spent on the encounter, which includes face to face time and non-face to face time preparing to see the patient (eg, review of tests), Obtaining and/or reviewing separately obtained history, documenting clinical information in the electronic or other health record, independently interpreting results (not separately reported) and communicating results to the patient/family/caregiver, or Care coordination (not separately reported).

## 2023-04-12 ENCOUNTER — OFFICE VISIT (OUTPATIENT)
Dept: PAIN MEDICINE | Facility: CLINIC | Age: 63
End: 2023-04-12
Payer: COMMERCIAL

## 2023-04-12 VITALS
BODY MASS INDEX: 29.48 KG/M2 | HEIGHT: 72 IN | DIASTOLIC BLOOD PRESSURE: 94 MMHG | HEART RATE: 87 BPM | WEIGHT: 217.63 LBS | SYSTOLIC BLOOD PRESSURE: 187 MMHG

## 2023-04-12 DIAGNOSIS — M51.36 DDD (DEGENERATIVE DISC DISEASE), LUMBAR: Chronic | ICD-10-CM

## 2023-04-12 DIAGNOSIS — M48.061 SPINAL STENOSIS OF LUMBAR REGION, UNSPECIFIED WHETHER NEUROGENIC CLAUDICATION PRESENT: ICD-10-CM

## 2023-04-12 DIAGNOSIS — M48.062 SPINAL STENOSIS OF LUMBAR REGION WITH NEUROGENIC CLAUDICATION: ICD-10-CM

## 2023-04-12 DIAGNOSIS — M43.16 SPONDYLOLISTHESIS OF LUMBAR REGION: ICD-10-CM

## 2023-04-12 DIAGNOSIS — M47.816 LUMBAR SPONDYLOSIS: ICD-10-CM

## 2023-04-12 DIAGNOSIS — M54.16 LUMBAR RADICULOPATHY: Primary | ICD-10-CM

## 2023-04-12 PROCEDURE — 99204 PR OFFICE/OUTPT VISIT, NEW, LEVL IV, 45-59 MIN: ICD-10-PCS | Mod: S$GLB,,, | Performed by: ANESTHESIOLOGY

## 2023-04-12 PROCEDURE — 99999 PR PBB SHADOW E&M-EST. PATIENT-LVL IV: ICD-10-PCS | Mod: PBBFAC,,, | Performed by: ANESTHESIOLOGY

## 2023-04-12 PROCEDURE — 99999 PR PBB SHADOW E&M-EST. PATIENT-LVL IV: CPT | Mod: PBBFAC,,, | Performed by: ANESTHESIOLOGY

## 2023-04-12 PROCEDURE — 99204 OFFICE O/P NEW MOD 45 MIN: CPT | Mod: S$GLB,,, | Performed by: ANESTHESIOLOGY

## 2023-04-12 RX ORDER — METHYLPREDNISOLONE 4 MG/1
TABLET ORAL
Qty: 21 EACH | Refills: 0 | Status: SHIPPED | OUTPATIENT
Start: 2023-04-12 | End: 2023-08-31

## 2023-04-12 RX ORDER — PREGABALIN 50 MG/1
50 CAPSULE ORAL 2 TIMES DAILY
Qty: 60 CAPSULE | Refills: 1 | Status: SHIPPED | OUTPATIENT
Start: 2023-04-12 | End: 2023-05-17 | Stop reason: SDUPTHER

## 2023-04-13 NOTE — PROGRESS NOTES
New Patient Interventional Pain Note (Initial Visit)    Referring Physician: Hernando Kraft NP    PCP: Jaky Quinn MD    Chief Complaint:   Chief Complaint   Patient presents with    Low-back Pain     Radiating to right leg        SUBJECTIVE:    Rafa Padilla is a 62 y.o. male who presents to the clinic for the evaluation of lower back pain.  Patient reports 4 month history of lower back pain.  Patient denies any inciting events.  Patient denies any previous traumas to his lower back.  Patient denies any previous surgical intervention on his lumbar spine.  Pain is described as an aching sharp pain that starts in his right lower back and then radiates down the lateral and posterior aspect of his right lower extremity to his foot.  Pain is worse with prolonged standing and extension, better with flexion and heat.  Pain is rated a 7/10 Denies any fevers, chills, saddle anesthesia, or bowel and bladder incontinence      Non-Pharmacologic Treatments:  Physical Therapy/Home Exercise: yes  Ice/Heat:yes  TENS: no  Acupuncture: no  Massage: yes  Chiropractic: no        Previous Pain Medications:  NSAIDs, Tylenol, muscle relaxers, topicals     report:  Reviewed and consistent with medication use as prescribed.    Pain Procedures:   none      Imaging:     Results for orders placed during the hospital encounter of 10/19/22    MRI Lumbar Spine Without Contrast    Narrative  EXAMINATION:  MRI LUMBAR SPINE WITHOUT CONTRAST    CLINICAL HISTORY:  Spinal stenosis, lumbar;numbness bilateral upper thighs x 2 occasions; Other intervertebral disc degeneration, lumbar region    TECHNIQUE:  Multiplanar, multisequence MR images were acquired from the thoracolumbar junction to the sacrum without the administration of contrast.    COMPARISON:  None.    FINDINGS:  Alignment: Slight retrolisthesis of L2 on L3 and L3 on L4.    Vertebrae: Mild edema is present within the L2-L3 and L3-L4 vertebral bodies adjacent to the discs,  degenerative in etiology.    Discs: Multilevel disc desiccation is present.    Cord: No abnormal signal within the cord.  Conus terminates at L1    Degenerative findings:    T12-L1: No significant disc disease, neural foraminal narrowing or spinal canal stenosis.    L1-L2: Disc bulge and left facet arthropathy contributes to mild left neural foraminal narrowing without right neural foraminal narrowing or spinal canal stenosis.    L2-L3: Retrolisthesis, disc bulge and facet arthropathy with moderate left and mild right neural foraminal narrowing and mild spinal canal stenosis.    L3-L4: Retrolisthesis, disc bulge and facet arthropathy contributes to moderate right and mild left neural foraminal narrowing and mild spinal canal stenosis.    L4-L5: Disc bulge and facet arthropathy with mild bilateral neural foraminal narrowing without spinal canal stenosis    L5-S1: Disc bulge and facet arthropathy with mild left greater than right neural foraminal narrowing without spinal canal stenosis.    Paraspinal muscles & soft tissues: Unremarkable.    Impression  Multilevel multifactorial degenerative changes are present greatest at L2-L3 with moderate left neural foraminal narrowing and L3-L4 with moderate right neural foraminal narrowing.      Electronically signed by: Rey Conde  Date:    10/19/2022  Time:    18:26    Results for orders placed during the hospital encounter of 09/19/22    X-Ray Lumbar Complete Including Flex And Ext    Narrative  EXAMINATION:  XR LUMBAR SPINE 5 VIEW WITH FLEX AND EXT    INDICATION:  Other intervertebral disc degeneration, lumbar region    COMPARISON:  Lumbar spine radiographs from 08/31/2012    FINDINGS:  AP, oblique, lateral views of the lumbar spine are obtained with flexion and extension views.  5 non-rib-bearing lumbar type vertebral bodies are present.  There is 1-2 mm retrolisthesis of L2 on L3 and L3 on L4.  No other anterolisthesis or retrolisthesis.  There is no abnormal motion  with dynamic maneuvers.  Mild-to-moderate degenerative disc changes are present throughout the lumbar spine, worst at L2-L3.    Impression  1. Multilevel degenerative disc changes, worst at L2-L3.      Electronically signed by: Radhames Braxton MD  Date:    09/19/2022  Time:    11:34  t.  .      Past Medical History:   Diagnosis Date    Anxiety     BPH (benign prostatic hyperplasia)     Degenerative disc disease     Hyperlipidemia     Prediabetes     Spinal stenosis, lumbar     Tobacco use      History reviewed. No pertinent surgical history.  Social History     Socioeconomic History    Marital status:      Spouse name: Aurora    Number of children: 2   Occupational History     Employer: [employer]   Tobacco Use    Smoking status: Every Day     Packs/day: 0.50     Years: 40.00     Pack years: 20.00     Types: Cigarettes    Smokeless tobacco: Never   Substance and Sexual Activity    Alcohol use: Yes     Comment: occasionally    Drug use: No    Sexual activity: Yes     Partners: Female     Birth control/protection: None     Family History   Problem Relation Age of Onset    Arthritis Mother     COPD Mother     Diabetes Mother     Heart disease Mother     Hypertension Mother     No Known Problems Father        Review of patient's allergies indicates:  No Known Allergies    Current Outpatient Medications   Medication Sig    aspirin 81 mg Tab Take by mouth. 1 Tablet Oral Every day    atorvastatin (LIPITOR) 20 MG tablet Take 1 tablet (20 mg total) by mouth once daily.    ibuprofen (ADVIL,MOTRIN) 600 MG tablet Take 1 tablet (600 mg total) by mouth 3 (three) times daily as needed for Pain.    tamsulosin (FLOMAX) 0.4 mg Cap Take 2 capsules (0.8 mg total) by mouth once daily.    buPROPion (WELLBUTRIN XL) 150 MG TB24 tablet Take 1 tablet (150 mg total) by mouth once daily. (Patient not taking: Reported on 2/14/2023)    methylPREDNISolone (MEDROL DOSEPACK) 4 mg tablet use as directed    nicotine (NICODERM  CQ) 14 mg/24 hr Place 1 patch onto the skin once daily. (Patient not taking: Reported on 2/14/2023)    pregabalin (LYRICA) 50 MG capsule Take 1 capsule (50 mg total) by mouth 2 (two) times daily.    sildenafil (VIAGRA) 100 MG tablet 1/2 or 1 tab po q 24 hours prn sexual activity (Patient not taking: Reported on 2/14/2023)     No current facility-administered medications for this visit.         ROS  Review of Systems   Constitutional:  Negative for activity change, appetite change and fever.   HENT:  Negative for facial swelling, rhinorrhea and sore throat.    Eyes:  Negative for pain and redness.   Respiratory:  Negative for cough, chest tightness, shortness of breath, wheezing and stridor.    Cardiovascular:  Negative for chest pain, palpitations and leg swelling.   Gastrointestinal:  Negative for abdominal pain, blood in stool, constipation, diarrhea, nausea and vomiting.   Endocrine: Negative for polydipsia, polyphagia and polyuria.   Genitourinary:  Positive for urgency. Negative for dysuria and hematuria.   Musculoskeletal:  Positive for arthralgias, back pain, gait problem and myalgias. Negative for joint swelling, neck pain and neck stiffness.   Skin:  Negative for rash.   Allergic/Immunologic: Negative for food allergies.   Neurological:  Positive for weakness and numbness. Negative for dizziness, tremors, seizures, syncope, facial asymmetry, speech difficulty, light-headedness and headaches.   Psychiatric/Behavioral:  Negative for agitation, hallucinations, self-injury and suicidal ideas. The patient is not nervous/anxious and is not hyperactive.           OBJECTIVE:  BP (!) 187/94 (BP Location: Left arm, Patient Position: Sitting)   Pulse 87   Ht 6' (1.829 m)   Wt 98.7 kg (217 lb 9.5 oz)   BMI 29.51 kg/m²         Physical Exam  Constitutional:       General: He is not in acute distress.     Appearance: Normal appearance. He is not ill-appearing.   HENT:      Head: Normocephalic and atraumatic.       Nose: No congestion or rhinorrhea.   Eyes:      Extraocular Movements: Extraocular movements intact.      Pupils: Pupils are equal, round, and reactive to light.   Cardiovascular:      Pulses: Normal pulses.   Pulmonary:      Effort: Pulmonary effort is normal.   Abdominal:      General: Abdomen is flat.      Palpations: Abdomen is soft.   Musculoskeletal:      Cervical back: Normal range of motion and neck supple.   Skin:     General: Skin is warm and dry.      Capillary Refill: Capillary refill takes less than 2 seconds.   Neurological:      General: No focal deficit present.      Mental Status: He is alert and oriented to person, place, and time.      Sensory: No sensory deficit.      Motor: Weakness present. No abnormal muscle tone.      Gait: Gait abnormal.      Deep Tendon Reflexes:      Reflex Scores:       Patellar reflexes are 2+ on the right side and 2+ on the left side.       Achilles reflexes are 2+ on the right side and 2+ on the left side.     Comments: Antalgic gait  4/5 strength in right plantar flexion and dorsiflexion   Psychiatric:         Mood and Affect: Mood normal.         Behavior: Behavior normal.         Thought Content: Thought content normal.         Musculoskeletal:    Lumbar Exam  Incision: no  Pain with Flexion: no  Pain with Extension: yes  ROM:  Decreased  Paraspinous TTP:  Right lumbar paraspinous  Facet TTP:  L5-S1  Facet Loading:  Positive on the right  SLR:  Positive on the right at 70°  SIJ TTP:  Negative bilaterally  LUIS ARMANDO:  Negative bilaterally      LABS:  Lab Results   Component Value Date    WBC 7.25 09/15/2022    HGB 16.3 09/15/2022    HCT 49.3 09/15/2022    MCV 93 09/15/2022     09/15/2022       CMP  Sodium   Date Value Ref Range Status   09/15/2022 140 136 - 145 mmol/L Final     Potassium   Date Value Ref Range Status   09/15/2022 4.4 3.5 - 5.1 mmol/L Final     Chloride   Date Value Ref Range Status   09/15/2022 105 95 - 110 mmol/L Final     CO2   Date Value Ref  Range Status   09/15/2022 27 23 - 29 mmol/L Final     Glucose   Date Value Ref Range Status   09/15/2022 107 70 - 110 mg/dL Final     BUN   Date Value Ref Range Status   09/15/2022 16 8 - 23 mg/dL Final     Creatinine   Date Value Ref Range Status   09/15/2022 0.9 0.5 - 1.4 mg/dL Final     Calcium   Date Value Ref Range Status   09/15/2022 10.0 8.7 - 10.5 mg/dL Final     Total Protein   Date Value Ref Range Status   09/15/2022 7.2 6.0 - 8.4 g/dL Final     Albumin   Date Value Ref Range Status   09/15/2022 4.5 3.5 - 5.2 g/dL Final     Total Bilirubin   Date Value Ref Range Status   09/15/2022 0.5 0.1 - 1.0 mg/dL Final     Comment:     For infants and newborns, interpretation of results should be based  on gestational age, weight and in agreement with clinical  observations.    Premature Infant recommended reference ranges:  Up to 24 hours.............<8.0 mg/dL  Up to 48 hours............<12.0 mg/dL  3-5 days..................<15.0 mg/dL  6-29 days.................<15.0 mg/dL       Alkaline Phosphatase   Date Value Ref Range Status   09/15/2022 43 (L) 55 - 135 U/L Final     AST   Date Value Ref Range Status   09/15/2022 22 10 - 40 U/L Final     ALT   Date Value Ref Range Status   09/15/2022 25 10 - 44 U/L Final     Anion Gap   Date Value Ref Range Status   09/15/2022 8 8 - 16 mmol/L Final     eGFR if    Date Value Ref Range Status   04/22/2021 >60.0 >60 mL/min/1.73 m^2 Final     eGFR if non    Date Value Ref Range Status   04/22/2021 >60.0 >60 mL/min/1.73 m^2 Final     Comment:     Calculation used to obtain the estimated glomerular filtration  rate (eGFR) is the CKD-EPI equation.          Lab Results   Component Value Date    HGBA1C 6.1 (H) 09/15/2022             ASSESSMENT:       62 y.o. year old male with lower back pain, consistent with     1. Lumbar radiculopathy  pregabalin (LYRICA) 50 MG capsule    methylPREDNISolone (MEDROL DOSEPACK) 4 mg tablet      2. DDD (degenerative disc  disease), lumbar  Ambulatory referral/consult to Pain Clinic    pregabalin (LYRICA) 50 MG capsule    methylPREDNISolone (MEDROL DOSEPACK) 4 mg tablet      3. Spinal stenosis of lumbar region, unspecified whether neurogenic claudication present  Ambulatory referral/consult to Pain Clinic    pregabalin (LYRICA) 50 MG capsule    methylPREDNISolone (MEDROL DOSEPACK) 4 mg tablet      4. Lumbar spondylosis  pregabalin (LYRICA) 50 MG capsule    methylPREDNISolone (MEDROL DOSEPACK) 4 mg tablet      5. Spondylolisthesis of lumbar region  pregabalin (LYRICA) 50 MG capsule    methylPREDNISolone (MEDROL DOSEPACK) 4 mg tablet      6. Spinal stenosis of lumbar region with neurogenic claudication  pregabalin (LYRICA) 50 MG capsule    methylPREDNISolone (MEDROL DOSEPACK) 4 mg tablet        Lumbar radiculopathy  -     pregabalin (LYRICA) 50 MG capsule; Take 1 capsule (50 mg total) by mouth 2 (two) times daily.  Dispense: 60 capsule; Refill: 1  -     methylPREDNISolone (MEDROL DOSEPACK) 4 mg tablet; use as directed  Dispense: 21 each; Refill: 0    DDD (degenerative disc disease), lumbar  -     Ambulatory referral/consult to Pain Clinic  -     pregabalin (LYRICA) 50 MG capsule; Take 1 capsule (50 mg total) by mouth 2 (two) times daily.  Dispense: 60 capsule; Refill: 1  -     methylPREDNISolone (MEDROL DOSEPACK) 4 mg tablet; use as directed  Dispense: 21 each; Refill: 0    Spinal stenosis of lumbar region, unspecified whether neurogenic claudication present  -     Ambulatory referral/consult to Pain Clinic  -     pregabalin (LYRICA) 50 MG capsule; Take 1 capsule (50 mg total) by mouth 2 (two) times daily.  Dispense: 60 capsule; Refill: 1  -     methylPREDNISolone (MEDROL DOSEPACK) 4 mg tablet; use as directed  Dispense: 21 each; Refill: 0    Lumbar spondylosis  -     pregabalin (LYRICA) 50 MG capsule; Take 1 capsule (50 mg total) by mouth 2 (two) times daily.  Dispense: 60 capsule; Refill: 1  -     methylPREDNISolone (MEDROL DOSEPACK)  4 mg tablet; use as directed  Dispense: 21 each; Refill: 0    Spondylolisthesis of lumbar region  -     pregabalin (LYRICA) 50 MG capsule; Take 1 capsule (50 mg total) by mouth 2 (two) times daily.  Dispense: 60 capsule; Refill: 1  -     methylPREDNISolone (MEDROL DOSEPACK) 4 mg tablet; use as directed  Dispense: 21 each; Refill: 0    Spinal stenosis of lumbar region with neurogenic claudication  -     pregabalin (LYRICA) 50 MG capsule; Take 1 capsule (50 mg total) by mouth 2 (two) times daily.  Dispense: 60 capsule; Refill: 1  -     methylPREDNISolone (MEDROL DOSEPACK) 4 mg tablet; use as directed  Dispense: 21 each; Refill: 0             PLAN:   - Interventions:   None at this time.  Can consider L5-S1 interlaminar  epidural steroid injection if pain continues  - Anticoagulation use:   yes aspirin    - Medications:   Start Lyrica 50 mg twice a day   Start Medrol Dosepak for acute flare-up of lumbar radiculopathy    - Therapy:    Patient has completed formal physical therapy with mild relief.  Continue home exercises.  Patient given physician directed home exercise program today in clinic    - Imaging/Diagnostic:   MRI of lumbar spine reviewed and findings discussed with patient.  Significant for grade 1 retrolisthesis of L2 upon L3 with diffuse foraminal stenosis    - Consults:   none at this time    - Counseled patient regarding the importance of physical therapy    - Patient Questions: Answered all of the patient's questions regarding diagnosis, therapy, and treatment     This condition does not require this patient to take time off of work, and the primary goal of our Pain Management services is to improve the patient's functional capacity.     - Follow up visit: return to clinic in 5-6 weeks        The above plan and management options were discussed at length with patient. Patient is in agreement with the above and verbalized understanding.    I discussed the goals of interventional chronic pain management  with the patient on today's visit.  I explained the utility of injections for diagnostic and therapeutic purposes.  We discussed a multimodal approach to pain including treating the patient's given worst pain at any given time.  We will use a systematic approach to addressing pain.  We will also adopt a multimodal approach that includes injections, adjuvant medications, physical therapy, at times psychiatry.  There may be a limited role for opioid use intermittently in the treatment of pain, more particularly for acute pain although no one approach can be used as a sole treatment modality.    I emphasized the importance of regular exercise, core strengthening and stretching, diet and weight loss as a cornerstone of long-term pain management.      Clinton Monaco MD  Interventional Pain Management  [facility]    Disclaimer:  This note was prepared using voice recognition system and is likely to have sound alike errors that may have been overlooked even after proof reading.  Please call me with any questions

## 2023-04-17 DIAGNOSIS — M51.36 DDD (DEGENERATIVE DISC DISEASE), LUMBAR: Chronic | ICD-10-CM

## 2023-04-17 DIAGNOSIS — M48.061 SPINAL STENOSIS OF LUMBAR REGION, UNSPECIFIED WHETHER NEUROGENIC CLAUDICATION PRESENT: ICD-10-CM

## 2023-04-17 RX ORDER — IBUPROFEN 600 MG/1
600 TABLET ORAL 3 TIMES DAILY PRN
Qty: 90 TABLET | Refills: 1 | Status: SHIPPED | OUTPATIENT
Start: 2023-04-17 | End: 2023-07-03 | Stop reason: SDUPTHER

## 2023-04-26 ENCOUNTER — CLINICAL SUPPORT (OUTPATIENT)
Dept: SMOKING CESSATION | Facility: CLINIC | Age: 63
End: 2023-04-26

## 2023-04-26 DIAGNOSIS — F17.200 NICOTINE DEPENDENCE: Primary | ICD-10-CM

## 2023-04-26 PROCEDURE — 99407 BEHAV CHNG SMOKING > 10 MIN: CPT | Mod: S$GLB,,,

## 2023-04-26 PROCEDURE — 99407 PR TOBACCO USE CESSATION INTENSIVE >10 MINUTES: ICD-10-PCS | Mod: S$GLB,,,

## 2023-04-26 PROCEDURE — 99999 PR PBB SHADOW E&M-EST. PATIENT-LVL I: CPT | Mod: PBBFAC,,,

## 2023-04-26 PROCEDURE — 99999 PR PBB SHADOW E&M-EST. PATIENT-LVL I: ICD-10-PCS | Mod: PBBFAC,,,

## 2023-04-26 NOTE — PROGRESS NOTES
Spoke with patient today in regard to smoking cessation progress for 6 month telephone follow up, he states not tobacco free. Patient states he is dealing with a back injury and not ready to return to the program at this time. Informed patient of benefit period, future follow ups, and contact information if any further help or support is needed. Will complete smart form for 6 month follow up on Quit attempt #1.

## 2023-05-17 ENCOUNTER — OFFICE VISIT (OUTPATIENT)
Dept: PAIN MEDICINE | Facility: CLINIC | Age: 63
End: 2023-05-17
Payer: COMMERCIAL

## 2023-05-17 DIAGNOSIS — M54.16 LUMBAR RADICULOPATHY: ICD-10-CM

## 2023-05-17 DIAGNOSIS — M51.36 DDD (DEGENERATIVE DISC DISEASE), LUMBAR: Chronic | ICD-10-CM

## 2023-05-17 DIAGNOSIS — M47.816 LUMBAR SPONDYLOSIS: ICD-10-CM

## 2023-05-17 DIAGNOSIS — M48.061 SPINAL STENOSIS OF LUMBAR REGION, UNSPECIFIED WHETHER NEUROGENIC CLAUDICATION PRESENT: ICD-10-CM

## 2023-05-17 DIAGNOSIS — M43.16 SPONDYLOLISTHESIS OF LUMBAR REGION: ICD-10-CM

## 2023-05-17 DIAGNOSIS — M48.062 SPINAL STENOSIS OF LUMBAR REGION WITH NEUROGENIC CLAUDICATION: ICD-10-CM

## 2023-05-17 PROCEDURE — 99214 PR OFFICE/OUTPT VISIT, EST, LEVL IV, 30-39 MIN: ICD-10-PCS | Mod: S$GLB,,, | Performed by: PHYSICIAN ASSISTANT

## 2023-05-17 PROCEDURE — 99999 PR PBB SHADOW E&M-EST. PATIENT-LVL III: CPT | Mod: PBBFAC,,, | Performed by: PHYSICIAN ASSISTANT

## 2023-05-17 PROCEDURE — 99999 PR PBB SHADOW E&M-EST. PATIENT-LVL III: ICD-10-PCS | Mod: PBBFAC,,, | Performed by: PHYSICIAN ASSISTANT

## 2023-05-17 PROCEDURE — 99214 OFFICE O/P EST MOD 30 MIN: CPT | Mod: S$GLB,,, | Performed by: PHYSICIAN ASSISTANT

## 2023-05-17 RX ORDER — MELOXICAM 7.5 MG/1
7.5 TABLET ORAL 2 TIMES DAILY
Qty: 60 TABLET | Refills: 3 | Status: SHIPPED | OUTPATIENT
Start: 2023-05-17 | End: 2023-07-03

## 2023-05-17 RX ORDER — CYCLOBENZAPRINE HCL 10 MG
10 TABLET ORAL NIGHTLY
Qty: 60 TABLET | Refills: 2 | Status: SHIPPED | OUTPATIENT
Start: 2023-05-17 | End: 2024-02-20 | Stop reason: ALTCHOICE

## 2023-05-17 RX ORDER — PREGABALIN 50 MG/1
100 CAPSULE ORAL 3 TIMES DAILY
Qty: 180 CAPSULE | Refills: 1 | Status: SHIPPED | OUTPATIENT
Start: 2023-05-17 | End: 2023-07-03 | Stop reason: SDUPTHER

## 2023-05-17 NOTE — PROGRESS NOTES
Est Patient Interventional Pain Note (Follow up Visit)    Referring Physician: No ref. provider found    PCP: Jaky Quinn MD    Chief Complaint:   Chief Complaint   Patient presents with    Low-back Pain    Hip Pain        SUBJECTIVE:  Interval History (5/17/2023):  Rafa Padilla presents today for follow-up visit.  Patient was last seen on 4/12/2023. At that visit, the plan was to start Lyrica and consider lumbar DOTTIE. Patient is not interested in injections at this time. He reports continued low back pain with radiation into his right lower extremity. Pain is worsened with prolonged standing, sitting, or laying down. He was previously started on Lyrica 50 mg BID, but states he discontinued because it was not helping. Takes Ibuprofen, 2 tablets every 3-4 hours per day. Patient reports pain as 8/10 today.    X-ray hip 02/24/2023  FINDINGS:  The bony pelvis is intact.  The right femoral head is well seated within the acetabulum.  No acute fracture or dislocation.  The hip joint space is well preserved.  Metallic density seen projecting over the right hemipelvis possibly representing a surgical staple.  This was not seen on prior films from 10/19/2022.    Initial HPI 04/12/2023  Rafa Padilla is a 62 y.o. male who presents to the clinic for the evaluation of lower back pain.  Patient reports 4 month history of lower back pain.  Patient denies any inciting events.  Patient denies any previous traumas to his lower back.  Patient denies any previous surgical intervention on his lumbar spine.  Pain is described as an aching sharp pain that starts in his right lower back and then radiates down the lateral and posterior aspect of his right lower extremity to his foot.  Pain is worse with prolonged standing and extension, better with flexion and heat.  Pain is rated a 7/10 Denies any fevers, chills, saddle anesthesia, or bowel and bladder incontinence      Non-Pharmacologic Treatments:  Physical Therapy/Home  Exercise: yes  Ice/Heat:yes  TENS: no  Acupuncture: no  Massage: yes  Chiropractic: no        Previous Pain Medications:  NSAIDs, Tylenol, muscle relaxers, topicals     report:  Reviewed and consistent with medication use as prescribed.    Pain Procedures:   none      Imaging:     Results for orders placed during the hospital encounter of 10/19/22    MRI Lumbar Spine Without Contrast    Narrative  EXAMINATION:  MRI LUMBAR SPINE WITHOUT CONTRAST    CLINICAL HISTORY:  Spinal stenosis, lumbar;numbness bilateral upper thighs x 2 occasions; Other intervertebral disc degeneration, lumbar region    TECHNIQUE:  Multiplanar, multisequence MR images were acquired from the thoracolumbar junction to the sacrum without the administration of contrast.    COMPARISON:  None.    FINDINGS:  Alignment: Slight retrolisthesis of L2 on L3 and L3 on L4.    Vertebrae: Mild edema is present within the L2-L3 and L3-L4 vertebral bodies adjacent to the discs, degenerative in etiology.    Discs: Multilevel disc desiccation is present.    Cord: No abnormal signal within the cord.  Conus terminates at L1    Degenerative findings:    T12-L1: No significant disc disease, neural foraminal narrowing or spinal canal stenosis.    L1-L2: Disc bulge and left facet arthropathy contributes to mild left neural foraminal narrowing without right neural foraminal narrowing or spinal canal stenosis.    L2-L3: Retrolisthesis, disc bulge and facet arthropathy with moderate left and mild right neural foraminal narrowing and mild spinal canal stenosis.    L3-L4: Retrolisthesis, disc bulge and facet arthropathy contributes to moderate right and mild left neural foraminal narrowing and mild spinal canal stenosis.    L4-L5: Disc bulge and facet arthropathy with mild bilateral neural foraminal narrowing without spinal canal stenosis    L5-S1: Disc bulge and facet arthropathy with mild left greater than right neural foraminal narrowing without spinal canal  stenosis.    Paraspinal muscles & soft tissues: Unremarkable.    Impression  Multilevel multifactorial degenerative changes are present greatest at L2-L3 with moderate left neural foraminal narrowing and L3-L4 with moderate right neural foraminal narrowing.      Electronically signed by: Rey Conde  Date:    10/19/2022  Time:    18:26    Results for orders placed during the hospital encounter of 09/19/22    X-Ray Lumbar Complete Including Flex And Ext    Narrative  EXAMINATION:  XR LUMBAR SPINE 5 VIEW WITH FLEX AND EXT    INDICATION:  Other intervertebral disc degeneration, lumbar region    COMPARISON:  Lumbar spine radiographs from 08/31/2012    FINDINGS:  AP, oblique, lateral views of the lumbar spine are obtained with flexion and extension views.  5 non-rib-bearing lumbar type vertebral bodies are present.  There is 1-2 mm retrolisthesis of L2 on L3 and L3 on L4.  No other anterolisthesis or retrolisthesis.  There is no abnormal motion with dynamic maneuvers.  Mild-to-moderate degenerative disc changes are present throughout the lumbar spine, worst at L2-L3.    Impression  1. Multilevel degenerative disc changes, worst at L2-L3.      Electronically signed by: Radhames Braxton MD  Date:    09/19/2022  Time:    11:34  t.  .      Past Medical History:   Diagnosis Date    Anxiety     BPH (benign prostatic hyperplasia)     Degenerative disc disease     Hyperlipidemia     Prediabetes     Spinal stenosis, lumbar     Tobacco use      No past surgical history on file.  Social History     Socioeconomic History    Marital status:      Spouse name: Aurora    Number of children: 2   Occupational History     Employer: [employer]   Tobacco Use    Smoking status: Every Day     Packs/day: 0.50     Years: 40.00     Pack years: 20.00     Types: Cigarettes    Smokeless tobacco: Never   Substance and Sexual Activity    Alcohol use: Yes     Comment: occasionally    Drug use: No    Sexual activity: Yes      Partners: Female     Birth control/protection: None     Family History   Problem Relation Age of Onset    Arthritis Mother     COPD Mother     Diabetes Mother     Heart disease Mother     Hypertension Mother     No Known Problems Father        Review of patient's allergies indicates:  No Known Allergies    Current Outpatient Medications   Medication Sig    aspirin 81 mg Tab Take by mouth. 1 Tablet Oral Every day    atorvastatin (LIPITOR) 20 MG tablet Take 1 tablet (20 mg total) by mouth once daily.    buPROPion (WELLBUTRIN XL) 150 MG TB24 tablet Take 1 tablet (150 mg total) by mouth once daily.    ibuprofen (ADVIL,MOTRIN) 600 MG tablet Take 1 tablet (600 mg total) by mouth 3 (three) times daily as needed for Pain.    methylPREDNISolone (MEDROL DOSEPACK) 4 mg tablet use as directed    nicotine (NICODERM CQ) 14 mg/24 hr Place 1 patch onto the skin once daily.    sildenafil (VIAGRA) 100 MG tablet 1/2 or 1 tab po q 24 hours prn sexual activity    tamsulosin (FLOMAX) 0.4 mg Cap Take 2 capsules (0.8 mg total) by mouth once daily.    cyclobenzaprine (FLEXERIL) 10 MG tablet Take 1 tablet (10 mg total) by mouth every evening. May cause drowsiness.    meloxicam (MOBIC) 7.5 MG tablet Take 1 tablet (7.5 mg total) by mouth 2 (two) times a day. as needed for pain, Do not take with other NSAIDs (ibuprofen, advil, aleve)    pregabalin (LYRICA) 50 MG capsule Take 2 capsules (100 mg total) by mouth 3 (three) times daily.     No current facility-administered medications for this visit.         ROS  Review of Systems   Constitutional:  Negative for activity change, appetite change and fever.   HENT:  Negative for facial swelling, rhinorrhea and sore throat.    Eyes:  Negative for pain and redness.   Respiratory:  Negative for cough, chest tightness, shortness of breath, wheezing and stridor.    Cardiovascular:  Negative for chest pain, palpitations and leg swelling.   Gastrointestinal:  Negative for abdominal pain, blood in stool,  constipation, diarrhea, nausea and vomiting.   Endocrine: Negative for polydipsia, polyphagia and polyuria.   Genitourinary:  Positive for urgency. Negative for dysuria and hematuria.   Musculoskeletal:  Positive for arthralgias, back pain, gait problem and myalgias. Negative for joint swelling, neck pain and neck stiffness.   Skin:  Negative for rash.   Allergic/Immunologic: Negative for food allergies.   Neurological:  Positive for weakness and numbness. Negative for dizziness, tremors, seizures, syncope, facial asymmetry, speech difficulty, light-headedness and headaches.   Psychiatric/Behavioral:  Negative for agitation, hallucinations, self-injury and suicidal ideas. The patient is not nervous/anxious and is not hyperactive.           OBJECTIVE:  There were no vitals taken for this visit.        Physical Exam  Constitutional:       General: He is not in acute distress.     Appearance: Normal appearance. He is not ill-appearing.   HENT:      Head: Normocephalic and atraumatic.      Nose: No congestion or rhinorrhea.   Eyes:      Extraocular Movements: Extraocular movements intact.      Pupils: Pupils are equal, round, and reactive to light.   Cardiovascular:      Pulses: Normal pulses.   Pulmonary:      Effort: Pulmonary effort is normal.   Abdominal:      General: Abdomen is flat.      Palpations: Abdomen is soft.   Musculoskeletal:      Cervical back: Normal range of motion and neck supple.   Skin:     General: Skin is warm and dry.      Capillary Refill: Capillary refill takes less than 2 seconds.   Neurological:      General: No focal deficit present.      Mental Status: He is alert and oriented to person, place, and time.      Sensory: No sensory deficit.      Motor: Weakness present. No abnormal muscle tone.      Gait: Gait abnormal.      Deep Tendon Reflexes:      Reflex Scores:       Patellar reflexes are 2+ on the right side and 2+ on the left side.       Achilles reflexes are 2+ on the right side and  2+ on the left side.     Comments: Antalgic gait  4/5 strength in right plantar flexion and dorsiflexion   Psychiatric:         Mood and Affect: Mood normal.         Behavior: Behavior normal.         Thought Content: Thought content normal.         Musculoskeletal:    Lumbar Exam  Incision: no  Pain with Flexion: no  Pain with Extension: yes  ROM:  Decreased  Paraspinous TTP:  Right lumbar paraspinous  Facet TTP:  L5-S1  Facet Loading:  Positive on the right  SLR:  Positive on the right at 70°  SIJ TTP:  Negative bilaterally  LUIS ARMANDO:  Negative bilaterally      LABS:  Lab Results   Component Value Date    WBC 7.25 09/15/2022    HGB 16.3 09/15/2022    HCT 49.3 09/15/2022    MCV 93 09/15/2022     09/15/2022       CMP  Sodium   Date Value Ref Range Status   09/15/2022 140 136 - 145 mmol/L Final     Potassium   Date Value Ref Range Status   09/15/2022 4.4 3.5 - 5.1 mmol/L Final     Chloride   Date Value Ref Range Status   09/15/2022 105 95 - 110 mmol/L Final     CO2   Date Value Ref Range Status   09/15/2022 27 23 - 29 mmol/L Final     Glucose   Date Value Ref Range Status   09/15/2022 107 70 - 110 mg/dL Final     BUN   Date Value Ref Range Status   09/15/2022 16 8 - 23 mg/dL Final     Creatinine   Date Value Ref Range Status   09/15/2022 0.9 0.5 - 1.4 mg/dL Final     Calcium   Date Value Ref Range Status   09/15/2022 10.0 8.7 - 10.5 mg/dL Final     Total Protein   Date Value Ref Range Status   09/15/2022 7.2 6.0 - 8.4 g/dL Final     Albumin   Date Value Ref Range Status   09/15/2022 4.5 3.5 - 5.2 g/dL Final     Total Bilirubin   Date Value Ref Range Status   09/15/2022 0.5 0.1 - 1.0 mg/dL Final     Comment:     For infants and newborns, interpretation of results should be based  on gestational age, weight and in agreement with clinical  observations.    Premature Infant recommended reference ranges:  Up to 24 hours.............<8.0 mg/dL  Up to 48 hours............<12.0 mg/dL  3-5 days..................<15.0  mg/dL  6-29 days.................<15.0 mg/dL       Alkaline Phosphatase   Date Value Ref Range Status   09/15/2022 43 (L) 55 - 135 U/L Final     AST   Date Value Ref Range Status   09/15/2022 22 10 - 40 U/L Final     ALT   Date Value Ref Range Status   09/15/2022 25 10 - 44 U/L Final     Anion Gap   Date Value Ref Range Status   09/15/2022 8 8 - 16 mmol/L Final     eGFR if    Date Value Ref Range Status   04/22/2021 >60.0 >60 mL/min/1.73 m^2 Final     eGFR if non    Date Value Ref Range Status   04/22/2021 >60.0 >60 mL/min/1.73 m^2 Final     Comment:     Calculation used to obtain the estimated glomerular filtration  rate (eGFR) is the CKD-EPI equation.          Lab Results   Component Value Date    HGBA1C 6.1 (H) 09/15/2022             ASSESSMENT:       62 y.o. year old male with lower back pain, consistent with     1. DDD (degenerative disc disease), lumbar  meloxicam (MOBIC) 7.5 MG tablet    pregabalin (LYRICA) 50 MG capsule      2. Spinal stenosis of lumbar region, unspecified whether neurogenic claudication present  pregabalin (LYRICA) 50 MG capsule      3. Lumbar spondylosis  pregabalin (LYRICA) 50 MG capsule      4. Lumbar radiculopathy  pregabalin (LYRICA) 50 MG capsule      5. Spondylolisthesis of lumbar region  pregabalin (LYRICA) 50 MG capsule      6. Spinal stenosis of lumbar region with neurogenic claudication  pregabalin (LYRICA) 50 MG capsule          DDD (degenerative disc disease), lumbar  -     meloxicam (MOBIC) 7.5 MG tablet; Take 1 tablet (7.5 mg total) by mouth 2 (two) times a day. as needed for pain, Do not take with other NSAIDs (ibuprofen, advil, aleve)  Dispense: 60 tablet; Refill: 3  -     pregabalin (LYRICA) 50 MG capsule; Take 2 capsules (100 mg total) by mouth 3 (three) times daily.  Dispense: 180 capsule; Refill: 1    Spinal stenosis of lumbar region, unspecified whether neurogenic claudication present  -     pregabalin (LYRICA) 50 MG capsule; Take 2  "capsules (100 mg total) by mouth 3 (three) times daily.  Dispense: 180 capsule; Refill: 1    Lumbar spondylosis  -     pregabalin (LYRICA) 50 MG capsule; Take 2 capsules (100 mg total) by mouth 3 (three) times daily.  Dispense: 180 capsule; Refill: 1    Lumbar radiculopathy  -     pregabalin (LYRICA) 50 MG capsule; Take 2 capsules (100 mg total) by mouth 3 (three) times daily.  Dispense: 180 capsule; Refill: 1    Spondylolisthesis of lumbar region  -     pregabalin (LYRICA) 50 MG capsule; Take 2 capsules (100 mg total) by mouth 3 (three) times daily.  Dispense: 180 capsule; Refill: 1    Spinal stenosis of lumbar region with neurogenic claudication  -     pregabalin (LYRICA) 50 MG capsule; Take 2 capsules (100 mg total) by mouth 3 (three) times daily.  Dispense: 180 capsule; Refill: 1    Other orders  -     cyclobenzaprine (FLEXERIL) 10 MG tablet; Take 1 tablet (10 mg total) by mouth every evening. May cause drowsiness.  Dispense: 60 tablet; Refill: 2               PLAN:   - Interventions:   None at this time.  Can consider L5-S1 interlaminar  epidural steroid injection if pain continues, patient defers injections at this time  - Anticoagulation use:   yes aspirin    - Medications:   Restart Lyrica Take 1 pill at night for 5 days.  Then take 1 pill at night and 1 pill in the morning, continue increasing by one tablet every 5 days until you achieve 100 mg three times daily   Begin Meloxicam 7.5 mg BID and DISCONTINUE ibuprofen and all other NSAIDs   - Start Flexeril (cyclobenzaprine) 10mg BID PRN muscle spasms (or can take 1/2 tablet). Patient understands this may cause drowsiness.    We discussed our roles and goals as interventional pain management, patient "jokingly" asking for oxy and fentanyl throughout visit, advised that this is not something that we will be providing    - Therapy:    Patient has completed formal physical therapy with mild relief.  Continue home exercises.  Patient given physician directed home " exercise program today in clinic    - Imaging/Diagnostic:   MRI of lumbar spine reviewed and findings discussed with patient.  Significant for grade 1 retrolisthesis of L2 upon L3 with diffuse foraminal stenosis    - Consults:   none at this time    - Counseled patient regarding the importance of physical therapy    - Patient Questions: Answered all of the patient's questions regarding diagnosis, therapy, and treatment     This condition does not require this patient to take time off of work, and the primary goal of our Pain Management services is to improve the patient's functional capacity.     - Follow up visit: return to clinic in 3 monhts        The above plan and management options were discussed at length with patient. Patient is in agreement with the above and verbalized understanding.    I discussed the goals of interventional chronic pain management with the patient on today's visit.  I explained the utility of injections for diagnostic and therapeutic purposes.  We discussed a multimodal approach to pain including treating the patient's given worst pain at any given time.  We will use a systematic approach to addressing pain.  We will also adopt a multimodal approach that includes injections, adjuvant medications, physical therapy, at times psychiatry.  There may be a limited role for opioid use intermittently in the treatment of pain, more particularly for acute pain although no one approach can be used as a sole treatment modality.    I emphasized the importance of regular exercise, core strengthening and stretching, diet and weight loss as a cornerstone of long-term pain management.      Briseyda Ruiz PA-C  Interventional Pain Management  [facility]    Disclaimer:  This note was prepared using voice recognition system and is likely to have sound alike errors that may have been overlooked even after proof reading.  Please call me with any questions

## 2023-05-24 ENCOUNTER — TELEPHONE (OUTPATIENT)
Dept: PAIN MEDICINE | Facility: CLINIC | Age: 63
End: 2023-05-24
Payer: COMMERCIAL

## 2023-05-24 NOTE — TELEPHONE ENCOUNTER
Called the pharmacy to check on prescription, then called patient to let him know script has be called in.

## 2023-05-24 NOTE — TELEPHONE ENCOUNTER
----- Message from Daniela Dillard sent at 5/24/2023 11:10 AM CDT -----  Contact: self [phone]  Patient called in this morning stating the pharmacy stated the never received the prescription for meloxicam (MOBIC) 7.5 MG tablet. Can you please refax it. Please call back [phone]. Thanks curtis

## 2023-07-03 ENCOUNTER — OFFICE VISIT (OUTPATIENT)
Dept: PAIN MEDICINE | Facility: CLINIC | Age: 63
End: 2023-07-03
Payer: COMMERCIAL

## 2023-07-03 VITALS
RESPIRATION RATE: 16 BRPM | WEIGHT: 217.81 LBS | HEART RATE: 66 BPM | SYSTOLIC BLOOD PRESSURE: 161 MMHG | DIASTOLIC BLOOD PRESSURE: 97 MMHG | BODY MASS INDEX: 29.5 KG/M2 | HEIGHT: 72 IN

## 2023-07-03 DIAGNOSIS — M48.061 SPINAL STENOSIS OF LUMBAR REGION, UNSPECIFIED WHETHER NEUROGENIC CLAUDICATION PRESENT: ICD-10-CM

## 2023-07-03 DIAGNOSIS — M51.36 DDD (DEGENERATIVE DISC DISEASE), LUMBAR: Chronic | ICD-10-CM

## 2023-07-03 DIAGNOSIS — M47.816 LUMBAR SPONDYLOSIS: ICD-10-CM

## 2023-07-03 DIAGNOSIS — M43.16 SPONDYLOLISTHESIS OF LUMBAR REGION: ICD-10-CM

## 2023-07-03 DIAGNOSIS — M54.16 LUMBAR RADICULOPATHY: ICD-10-CM

## 2023-07-03 DIAGNOSIS — M48.062 SPINAL STENOSIS OF LUMBAR REGION WITH NEUROGENIC CLAUDICATION: ICD-10-CM

## 2023-07-03 PROCEDURE — 99999 PR PBB SHADOW E&M-EST. PATIENT-LVL IV: ICD-10-PCS | Mod: PBBFAC,,, | Performed by: PHYSICIAN ASSISTANT

## 2023-07-03 PROCEDURE — 99999 PR PBB SHADOW E&M-EST. PATIENT-LVL IV: CPT | Mod: PBBFAC,,, | Performed by: PHYSICIAN ASSISTANT

## 2023-07-03 PROCEDURE — 99214 OFFICE O/P EST MOD 30 MIN: CPT | Mod: S$GLB,,, | Performed by: PHYSICIAN ASSISTANT

## 2023-07-03 PROCEDURE — 99214 PR OFFICE/OUTPT VISIT, EST, LEVL IV, 30-39 MIN: ICD-10-PCS | Mod: S$GLB,,, | Performed by: PHYSICIAN ASSISTANT

## 2023-07-03 RX ORDER — IBUPROFEN 600 MG/1
600 TABLET ORAL 3 TIMES DAILY PRN
Qty: 90 TABLET | Refills: 1 | Status: SHIPPED | OUTPATIENT
Start: 2023-07-03 | End: 2024-02-20 | Stop reason: ALTCHOICE

## 2023-07-03 RX ORDER — PREGABALIN 100 MG/1
100 CAPSULE ORAL 3 TIMES DAILY
Qty: 90 CAPSULE | Refills: 2 | Status: SHIPPED | OUTPATIENT
Start: 2023-07-03 | End: 2024-02-20 | Stop reason: ALTCHOICE

## 2023-07-03 NOTE — PROGRESS NOTES
Est Patient Interventional Pain Note (Follow up Visit)    Referring Physician: No ref. provider found    PCP: Jaky Quinn MD    Chief Complaint:   Chief Complaint   Patient presents with    Low-back Pain     Patient has pain in lower back, hip  and pain radiates down right leg.  Pain scale 7/10        SUBJECTIVE:  Interval History (7/3/2023):  Rafa Padilla presents today for follow-up visit.  Patient was last seen on 5/17/2023. At that visit, the plan was to restart Lyrica and increase to achieve therapeutic dose and switch to Mobic to decrease overuse of Ibuprofen. He reports excellent relief in radicular pain with Lyrica 200 mg TID, up until recently. He reports he had a pain flair recently with overdoing it with household chores. He reports he did a lot of sweeping, mopping, and cleaning over the weekend. His son is present today. States he also helps take care of his wife who is over 200 pounds and worries that this may also be exacerbating his pain. Patient reports pain as 7/10 today.'    Interval History (5/17/2023):  Rafa Padilla presents today for follow-up visit.  Patient was last seen on 4/12/2023. At that visit, the plan was to start Lyrica and consider lumbar DOTTIE. Patient is not interested in injections at this time. He reports continued low back pain with radiation into his right lower extremity. Pain is worsened with prolonged standing, sitting, or laying down. He was previously started on Lyrica 50 mg BID, but states he discontinued because it was not helping. Takes Ibuprofen, 2 tablets every 3-4 hours per day. Patient reports pain as 8/10 today.    X-ray hip 02/24/2023  FINDINGS:  The bony pelvis is intact.  The right femoral head is well seated within the acetabulum.  No acute fracture or dislocation.  The hip joint space is well preserved.  Metallic density seen projecting over the right hemipelvis possibly representing a surgical staple.  This was not seen on prior films from  10/19/2022.    Initial HPI 04/12/2023  Rafa Padilla is a 62 y.o. male who presents to the clinic for the evaluation of lower back pain.  Patient reports 4 month history of lower back pain.  Patient denies any inciting events.  Patient denies any previous traumas to his lower back.  Patient denies any previous surgical intervention on his lumbar spine.  Pain is described as an aching sharp pain that starts in his right lower back and then radiates down the lateral and posterior aspect of his right lower extremity to his foot.  Pain is worse with prolonged standing and extension, better with flexion and heat.  Pain is rated a 7/10 Denies any fevers, chills, saddle anesthesia, or bowel and bladder incontinence      Non-Pharmacologic Treatments:  Physical Therapy/Home Exercise: yes  Ice/Heat:yes  TENS: no  Acupuncture: no  Massage: yes  Chiropractic: no        Previous Pain Medications:  NSAIDs, Tylenol, muscle relaxers, topicals     report:  Reviewed and consistent with medication use as prescribed.    Pain Procedures:   none      Imaging:     Results for orders placed during the hospital encounter of 10/19/22    MRI Lumbar Spine Without Contrast    Narrative  EXAMINATION:  MRI LUMBAR SPINE WITHOUT CONTRAST    CLINICAL HISTORY:  Spinal stenosis, lumbar;numbness bilateral upper thighs x 2 occasions; Other intervertebral disc degeneration, lumbar region    TECHNIQUE:  Multiplanar, multisequence MR images were acquired from the thoracolumbar junction to the sacrum without the administration of contrast.    COMPARISON:  None.    FINDINGS:  Alignment: Slight retrolisthesis of L2 on L3 and L3 on L4.    Vertebrae: Mild edema is present within the L2-L3 and L3-L4 vertebral bodies adjacent to the discs, degenerative in etiology.    Discs: Multilevel disc desiccation is present.    Cord: No abnormal signal within the cord.  Conus terminates at L1    Degenerative findings:    T12-L1: No significant disc disease, neural  foraminal narrowing or spinal canal stenosis.    L1-L2: Disc bulge and left facet arthropathy contributes to mild left neural foraminal narrowing without right neural foraminal narrowing or spinal canal stenosis.    L2-L3: Retrolisthesis, disc bulge and facet arthropathy with moderate left and mild right neural foraminal narrowing and mild spinal canal stenosis.    L3-L4: Retrolisthesis, disc bulge and facet arthropathy contributes to moderate right and mild left neural foraminal narrowing and mild spinal canal stenosis.    L4-L5: Disc bulge and facet arthropathy with mild bilateral neural foraminal narrowing without spinal canal stenosis    L5-S1: Disc bulge and facet arthropathy with mild left greater than right neural foraminal narrowing without spinal canal stenosis.    Paraspinal muscles & soft tissues: Unremarkable.    Impression  Multilevel multifactorial degenerative changes are present greatest at L2-L3 with moderate left neural foraminal narrowing and L3-L4 with moderate right neural foraminal narrowing.      Electronically signed by: Rey Conde  Date:    10/19/2022  Time:    18:26    Results for orders placed during the hospital encounter of 09/19/22    X-Ray Lumbar Complete Including Flex And Ext    Narrative  EXAMINATION:  XR LUMBAR SPINE 5 VIEW WITH FLEX AND EXT    INDICATION:  Other intervertebral disc degeneration, lumbar region    COMPARISON:  Lumbar spine radiographs from 08/31/2012    FINDINGS:  AP, oblique, lateral views of the lumbar spine are obtained with flexion and extension views.  5 non-rib-bearing lumbar type vertebral bodies are present.  There is 1-2 mm retrolisthesis of L2 on L3 and L3 on L4.  No other anterolisthesis or retrolisthesis.  There is no abnormal motion with dynamic maneuvers.  Mild-to-moderate degenerative disc changes are present throughout the lumbar spine, worst at L2-L3.    Impression  1. Multilevel degenerative disc changes, worst at L2-L3.      Electronically  signed by: Radhames Braxton MD  Date:    09/19/2022  Time:    11:34  t.  .      Past Medical History:   Diagnosis Date    Anxiety     BPH (benign prostatic hyperplasia)     Degenerative disc disease     Hyperlipidemia     Prediabetes     Spinal stenosis, lumbar     Tobacco use      History reviewed. No pertinent surgical history.  Social History     Socioeconomic History    Marital status:      Spouse name: Aurora    Number of children: 2   Occupational History     Employer: [employer]   Tobacco Use    Smoking status: Every Day     Packs/day: 0.50     Years: 40.00     Pack years: 20.00     Types: Cigarettes    Smokeless tobacco: Never   Substance and Sexual Activity    Alcohol use: Yes     Comment: occasionally    Drug use: No    Sexual activity: Yes     Partners: Female     Birth control/protection: None     Family History   Problem Relation Age of Onset    Arthritis Mother     COPD Mother     Diabetes Mother     Heart disease Mother     Hypertension Mother     No Known Problems Father        Review of patient's allergies indicates:  No Known Allergies    Current Outpatient Medications   Medication Sig    aspirin 81 mg Tab Take by mouth. 1 Tablet Oral Every day    atorvastatin (LIPITOR) 20 MG tablet Take 1 tablet (20 mg total) by mouth once daily.    cyclobenzaprine (FLEXERIL) 10 MG tablet Take 1 tablet (10 mg total) by mouth every evening. May cause drowsiness.    sildenafil (VIAGRA) 100 MG tablet 1/2 or 1 tab po q 24 hours prn sexual activity    tamsulosin (FLOMAX) 0.4 mg Cap Take 2 capsules (0.8 mg total) by mouth once daily.    buPROPion (WELLBUTRIN XL) 150 MG TB24 tablet Take 1 tablet (150 mg total) by mouth once daily. (Patient not taking: Reported on 7/3/2023)    ibuprofen (ADVIL,MOTRIN) 600 MG tablet Take 1 tablet (600 mg total) by mouth 3 (three) times daily as needed for Pain.    methylPREDNISolone (MEDROL DOSEPACK) 4 mg tablet use as directed (Patient not taking: Reported on  7/3/2023)    nicotine (NICODERM CQ) 14 mg/24 hr Place 1 patch onto the skin once daily. (Patient not taking: Reported on 7/3/2023)    pregabalin (LYRICA) 100 MG capsule Take 1 capsule (100 mg total) by mouth 3 (three) times daily.     No current facility-administered medications for this visit.         ROS  Review of Systems   Constitutional:  Negative for activity change, appetite change and fever.   HENT:  Negative for facial swelling, rhinorrhea and sore throat.    Eyes:  Negative for pain and redness.   Respiratory:  Negative for cough, chest tightness, shortness of breath, wheezing and stridor.    Cardiovascular:  Negative for chest pain, palpitations and leg swelling.   Gastrointestinal:  Negative for abdominal pain, blood in stool, constipation, diarrhea, nausea and vomiting.   Endocrine: Negative for polydipsia, polyphagia and polyuria.   Genitourinary:  Positive for urgency. Negative for dysuria and hematuria.   Musculoskeletal:  Positive for arthralgias, back pain, gait problem and myalgias. Negative for joint swelling, neck pain and neck stiffness.   Skin:  Negative for rash.   Allergic/Immunologic: Negative for food allergies.   Neurological:  Positive for weakness and numbness. Negative for dizziness, tremors, seizures, syncope, facial asymmetry, speech difficulty, light-headedness and headaches.   Psychiatric/Behavioral:  Negative for agitation, hallucinations, self-injury and suicidal ideas. The patient is not nervous/anxious and is not hyperactive.           OBJECTIVE:  BP (!) 161/97   Pulse 66   Resp 16   Ht 6' (1.829 m)   Wt 98.8 kg (217 lb 13 oz)   BMI 29.54 kg/m²         Physical Exam  Constitutional:       General: He is not in acute distress.     Appearance: Normal appearance. He is not ill-appearing.   HENT:      Head: Normocephalic and atraumatic.      Nose: No congestion or rhinorrhea.   Eyes:      Extraocular Movements: Extraocular movements intact.      Pupils: Pupils are equal,  round, and reactive to light.   Cardiovascular:      Pulses: Normal pulses.   Pulmonary:      Effort: Pulmonary effort is normal.   Abdominal:      General: Abdomen is flat.      Palpations: Abdomen is soft.   Musculoskeletal:      Cervical back: Normal range of motion and neck supple.   Skin:     General: Skin is warm and dry.      Capillary Refill: Capillary refill takes less than 2 seconds.   Neurological:      General: No focal deficit present.      Mental Status: He is alert and oriented to person, place, and time.      Sensory: No sensory deficit.      Motor: Weakness present. No abnormal muscle tone.      Gait: Gait abnormal.      Deep Tendon Reflexes:      Reflex Scores:       Patellar reflexes are 2+ on the right side and 2+ on the left side.       Achilles reflexes are 2+ on the right side and 2+ on the left side.     Comments: Antalgic gait  4/5 strength in right plantar flexion and dorsiflexion   Psychiatric:         Mood and Affect: Mood normal.         Behavior: Behavior normal.         Thought Content: Thought content normal.         Musculoskeletal:    Lumbar Exam  Incision: no  Pain with Flexion: no  Pain with Extension: yes  ROM:  Decreased  Paraspinous TTP:  Right lumbar paraspinous  Facet TTP:  L5-S1  Facet Loading:  Positive on the right  SLR:  Positive on the right at 70°  SIJ TTP:  Negative bilaterally  LUIS ARMANDO:  Negative bilaterally      LABS:  Lab Results   Component Value Date    WBC 7.25 09/15/2022    HGB 16.3 09/15/2022    HCT 49.3 09/15/2022    MCV 93 09/15/2022     09/15/2022       CMP  Sodium   Date Value Ref Range Status   09/15/2022 140 136 - 145 mmol/L Final     Potassium   Date Value Ref Range Status   09/15/2022 4.4 3.5 - 5.1 mmol/L Final     Chloride   Date Value Ref Range Status   09/15/2022 105 95 - 110 mmol/L Final     CO2   Date Value Ref Range Status   09/15/2022 27 23 - 29 mmol/L Final     Glucose   Date Value Ref Range Status   09/15/2022 107 70 - 110 mg/dL Final      BUN   Date Value Ref Range Status   09/15/2022 16 8 - 23 mg/dL Final     Creatinine   Date Value Ref Range Status   09/15/2022 0.9 0.5 - 1.4 mg/dL Final     Calcium   Date Value Ref Range Status   09/15/2022 10.0 8.7 - 10.5 mg/dL Final     Total Protein   Date Value Ref Range Status   09/15/2022 7.2 6.0 - 8.4 g/dL Final     Albumin   Date Value Ref Range Status   09/15/2022 4.5 3.5 - 5.2 g/dL Final     Total Bilirubin   Date Value Ref Range Status   09/15/2022 0.5 0.1 - 1.0 mg/dL Final     Comment:     For infants and newborns, interpretation of results should be based  on gestational age, weight and in agreement with clinical  observations.    Premature Infant recommended reference ranges:  Up to 24 hours.............<8.0 mg/dL  Up to 48 hours............<12.0 mg/dL  3-5 days..................<15.0 mg/dL  6-29 days.................<15.0 mg/dL       Alkaline Phosphatase   Date Value Ref Range Status   09/15/2022 43 (L) 55 - 135 U/L Final     AST   Date Value Ref Range Status   09/15/2022 22 10 - 40 U/L Final     ALT   Date Value Ref Range Status   09/15/2022 25 10 - 44 U/L Final     Anion Gap   Date Value Ref Range Status   09/15/2022 8 8 - 16 mmol/L Final     eGFR if    Date Value Ref Range Status   04/22/2021 >60.0 >60 mL/min/1.73 m^2 Final     eGFR if non    Date Value Ref Range Status   04/22/2021 >60.0 >60 mL/min/1.73 m^2 Final     Comment:     Calculation used to obtain the estimated glomerular filtration  rate (eGFR) is the CKD-EPI equation.          Lab Results   Component Value Date    HGBA1C 6.1 (H) 09/15/2022             ASSESSMENT:       62 y.o. year old male with lower back pain, consistent with     1. DDD (degenerative disc disease), lumbar  pregabalin (LYRICA) 100 MG capsule    ibuprofen (ADVIL,MOTRIN) 600 MG tablet    Ambulatory referral/consult to Physical/Occupational Therapy      2. Spinal stenosis of lumbar region, unspecified whether neurogenic claudication  present  pregabalin (LYRICA) 100 MG capsule    ibuprofen (ADVIL,MOTRIN) 600 MG tablet    Ambulatory referral/consult to Physical/Occupational Therapy      3. Lumbar spondylosis  pregabalin (LYRICA) 100 MG capsule      4. Lumbar radiculopathy  pregabalin (LYRICA) 100 MG capsule      5. Spondylolisthesis of lumbar region  pregabalin (LYRICA) 100 MG capsule      6. Spinal stenosis of lumbar region with neurogenic claudication  pregabalin (LYRICA) 100 MG capsule            DDD (degenerative disc disease), lumbar  -     pregabalin (LYRICA) 100 MG capsule; Take 1 capsule (100 mg total) by mouth 3 (three) times daily.  Dispense: 90 capsule; Refill: 2  -     ibuprofen (ADVIL,MOTRIN) 600 MG tablet; Take 1 tablet (600 mg total) by mouth 3 (three) times daily as needed for Pain.  Dispense: 90 tablet; Refill: 1  -     Ambulatory referral/consult to Physical/Occupational Therapy; Future; Expected date: 07/10/2023    Spinal stenosis of lumbar region, unspecified whether neurogenic claudication present  -     pregabalin (LYRICA) 100 MG capsule; Take 1 capsule (100 mg total) by mouth 3 (three) times daily.  Dispense: 90 capsule; Refill: 2  -     ibuprofen (ADVIL,MOTRIN) 600 MG tablet; Take 1 tablet (600 mg total) by mouth 3 (three) times daily as needed for Pain.  Dispense: 90 tablet; Refill: 1  -     Ambulatory referral/consult to Physical/Occupational Therapy; Future; Expected date: 07/10/2023    Lumbar spondylosis  -     pregabalin (LYRICA) 100 MG capsule; Take 1 capsule (100 mg total) by mouth 3 (three) times daily.  Dispense: 90 capsule; Refill: 2    Lumbar radiculopathy  -     pregabalin (LYRICA) 100 MG capsule; Take 1 capsule (100 mg total) by mouth 3 (three) times daily.  Dispense: 90 capsule; Refill: 2    Spondylolisthesis of lumbar region  -     pregabalin (LYRICA) 100 MG capsule; Take 1 capsule (100 mg total) by mouth 3 (three) times daily.  Dispense: 90 capsule; Refill: 2    Spinal stenosis of lumbar region with  "neurogenic claudication  -     pregabalin (LYRICA) 100 MG capsule; Take 1 capsule (100 mg total) by mouth 3 (three) times daily.  Dispense: 90 capsule; Refill: 2                 PLAN:   - Interventions:   None at this time.  Can consider L5-S1 interlaminar  epidural steroid injection if pain continues, patient defers injections at this time  - Will prescribe Medrol Dose pack with instructions - for acute pain flare:  Day 1: 2 tablets before breakfast, 1 tablet after lunch, 1 tablet after dinner, 2 tablets at bedtime   Day 2: 1 tablet before breakfast, 1 tablet after lunch, 1 tablet after dinner, 2 tablets at bedtime   Day 3: 1 tablet before breakfast, 1 tablet after lunch, 1 tablet after dinner, 1 tablet at bedtime   Day 4: 1 tablet before breakfast, 1 tablet after lunch, 1 tablet at bedtime   Day 5: 1 tablet before breakfast, 1 tablet at bedtime   Day 6: 1 tablet before breakfast.    - Anticoagulation use:   yes aspirin    - Medications:   Restart Lyrica Take 1 pill at night for 5 days.  Then take 1 pill at night and 1 pill in the morning, continue increasing by one tablet every 5 days until you achieve 100 mg three times daily   He reports Ibuprofen offers more relief than Mobic, will discontinue Mobic and continue Ibuprofen 600 mg   -  Flexeril (cyclobenzaprine) 10mg BID PRN muscle spasms (or can take 1/2 tablet). Patient understands this may cause drowsiness.    We discussed our roles and goals as interventional pain management, patient "jokingly" asking for oxy and fentanyl throughout visit, advised that this is not something that we will be providing    - Therapy:    Patient has completed formal physical therapy with mild relief. New orders placed to restart physical therapy for low back     - Imaging/Diagnostic:   MRI of lumbar spine reviewed and findings discussed with patient.  Significant for grade 1 retrolisthesis of L2 upon L3 with diffuse foraminal stenosis    - Consults:   none at this time    - " Counseled patient regarding the importance of physical therapy    - Patient Questions: Answered all of the patient's questions regarding diagnosis, therapy, and treatment     This condition does not require this patient to take time off of work, and the primary goal of our Pain Management services is to improve the patient's functional capacity.     - Follow up visit: return to clinic in 3 monhts        The above plan and management options were discussed at length with patient. Patient is in agreement with the above and verbalized understanding.    I discussed the goals of interventional chronic pain management with the patient on today's visit.  I explained the utility of injections for diagnostic and therapeutic purposes.  We discussed a multimodal approach to pain including treating the patient's given worst pain at any given time.  We will use a systematic approach to addressing pain.  We will also adopt a multimodal approach that includes injections, adjuvant medications, physical therapy, at times psychiatry.  There may be a limited role for opioid use intermittently in the treatment of pain, more particularly for acute pain although no one approach can be used as a sole treatment modality.    I emphasized the importance of regular exercise, core strengthening and stretching, diet and weight loss as a cornerstone of long-term pain management.      Briseyda Ruiz PA-C  Interventional Pain Management  [facility]    Disclaimer:  This note was prepared using voice recognition system and is likely to have sound alike errors that may have been overlooked even after proof reading.  Please call me with any questions

## 2023-07-19 ENCOUNTER — CLINICAL SUPPORT (OUTPATIENT)
Dept: REHABILITATION | Facility: HOSPITAL | Age: 63
End: 2023-07-19
Payer: COMMERCIAL

## 2023-07-19 DIAGNOSIS — R53.1 DECREASED STRENGTH, ENDURANCE, AND MOBILITY: ICD-10-CM

## 2023-07-19 DIAGNOSIS — M48.061 SPINAL STENOSIS OF LUMBAR REGION, UNSPECIFIED WHETHER NEUROGENIC CLAUDICATION PRESENT: ICD-10-CM

## 2023-07-19 DIAGNOSIS — R26.89 FUNCTIONAL GAIT ABNORMALITY: ICD-10-CM

## 2023-07-19 DIAGNOSIS — M51.36 DDD (DEGENERATIVE DISC DISEASE), LUMBAR: Chronic | ICD-10-CM

## 2023-07-19 DIAGNOSIS — M25.60 DECREASED RANGE OF MOTION: ICD-10-CM

## 2023-07-19 DIAGNOSIS — R68.89 DECREASED STRENGTH, ENDURANCE, AND MOBILITY: ICD-10-CM

## 2023-07-19 DIAGNOSIS — Z74.09 DECREASED STRENGTH, ENDURANCE, AND MOBILITY: ICD-10-CM

## 2023-07-19 PROCEDURE — 97162 PT EVAL MOD COMPLEX 30 MIN: CPT

## 2023-07-19 NOTE — PROGRESS NOTES
[facility]   Physical Therapy Initial Evaluation      Date: 7/19/2023   Name: Rafa [facility] Number: [id number]    Therapy Diagnosis:   Encounter Diagnoses   Name Primary?    DDD (degenerative disc disease), lumbar     Spinal stenosis of lumbar region, unspecified whether neurogenic claudication present     Functional gait abnormality     Decreased strength, endurance, and mobility     Decreased range of motion       Physician: Briseyda Ruiz,*     Physician Orders: PT Eval and Treat  Medical Diagnosis from Referral:   M51.36 (ICD-10-CM) - DDD (degenerative disc disease), lumbar  M48.061 (ICD-10-CM) - Spinal stenosis of lumbar region, unspecified whether neurogenic claudication present  Evaluation Date: 7/19/2023  Authorization Period Expiration: 12/31/2023  Plan of Care Expiration: 10/19/2023  Progress Note Due: 8/19/2023  Visit # / Visits authorized: 1/1   FOTO: 1/3 (last performed on 7/19/2023)    Precautions: Standard    Time In: 1303  Time Out: 1405  Total Billable Time (timed & untimed codes): 53 minutes    Subjective     Date of onset: pain last 6 months started back in jan/feb,     History of current condition - Rafa reports history of chronic low back pain with exacerbation ~6 months ago. 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 Patient had a similar injury in 2013 and went to therapy with good results. Patient reports pain with prolonged sitting and standing as well as lifting and extension. Forward bending eases pain, Patient feels almost no pain first thing in the morning, but as he moves around pain gets worse. Patient is taking medication for pain but wants to get off all pain medication. Patient reports pain gets better with forward flexion and laying down. Patient said his son gave him some exercises to do at home including forward stretching and getting on and off the floor.    Falls: no falls    Imaging: [x] Xray [] MRI  [] CT: Performed on: 2/14/2023    Pain:  Current 4/10, worst 9/10, best 2/10   Location: [x] Right   [] Left:  low back to hip and shooting nerve pain  Description: burning, deep, electric, numbness, tingling, and shooting  Aggravating Factors: sitting on a stool,sitting to long in a chair, lying in right side in bed, arching back.   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   Current Level of Function: Patient has pain with standing and sitting for extended times also pain increased when he goes grocery shopping or when lifting his wife when she falls.     Dominant Extremity:    [x] Right    [] Left    Pts goals: Pt reported goals are to decrease overall pain levels in order to return to prior functional level. Improve flexibility, tranfer wife pain free, and grocey shopping pain free.     Medical History:   Past Medical History:   Diagnosis Date    Anxiety     BPH (benign prostatic hyperplasia)     Degenerative disc disease     Hyperlipidemia     Prediabetes     Spinal stenosis, lumbar     Tobacco use        Surgical History:   Rafa Padilla  has no past surgical history on file.    Medications:   Rafa has a current medication list which includes the following prescription(s): aspirin, atorvastatin, bupropion, cyclobenzaprine, ibuprofen, methylprednisolone, nicotine, pregabalin, sildenafil, and tamsulosin.    Allergies:   Review of patient's allergies indicates:  No Known Allergies     Objective        RANGE OF MOTION:   Lumbar ROM Right  (spine) Left   Pain/Dysfunction with Movement Goal   Lumbar Flexion (60º) 75% ---     Lumbar Extension (30º) 25% --- painful 50%   Lumbar Side  Bending (25º) 25% 75% Pain on R 50% on R   Lumbar Rotation 75% 75% No pain        Hip AROM/PROM Right Left Pain/Dysfunction with Movement Goal   Hip Flexion (120º) 100 100     Hip Extension (30º) 10 10 Compensated with hip/trunk rotation 20 B    Hip Abduction (45º) 30 30     Hip IR (45º) 25 25 Compensation with hip flexors    Hip ER (45º) 25 25 Compensation with hip flexors           STRENGTH:   L/E MMT Right  (spine) Left Pain/Dysfunction with Movement Goal   Hip Flexion  4+/5 4+/5  4+/5 B   Hip Extension  3+/5 3+/5  4+/5 B   Hip Abduction  3/5 4+/5  4+/5 B   Knee Extension 5/5 5/5  5/5 B   Knee Flexion 4-/5 4/5  5/5 B   Hip IR 4-/5 4/5 Pain in glute area  4+/5 B   Hip ER 4-/5 4/5  4+/5 B         SPECIAL TESTS:    Right  (spine) Left  Goal   SLUMP [x] Positive    [] Negative [] Positive    [x] Negative Negative B    Straight Leg Raise [x] Positive    [] Negative [] Positive    [x] Negative Negative B    ASIS Compression [x] Positive    [] Negative --- Negative           SENSATION  [x] Intact to Light Touch   [] Impaired:      PALPATION: Muscles: Increased tone and tenderness to palpation of: right , glutes, piriformis, TFL , ITB, quadriceps.     POSTURE:  Pt presents with postural abnormalities which include:    [x] Forward Head   [] Increased Lumbar Lordosis   [x] Rounded Shoulder   [] Genu Recurvatum   [] Increased Thoracic Kyphosis [] Genu Valgus   [] Trunk Deviated    [] Pes Planus   [] Scapular Winging    [] Other:       GAIT ANALYSIS The patient ambulated with the following assistive device: quad cane; the pt presents with the following gait abnormalities: trendelenburg, bradykinetic, increased SYL, decreased step length on right, and decreased hip extension on right      FUNCTIONAL MOVEMENT PATTERNS  Movement Analysis Notes   Bed Mobility  [x]Functional  []Dysfunctional:  []Painful  [x]Non-Painful       Transfers [x]Functional  []Dysfunctional:  [x]Painful  []Non-Painful    Mild pain in glute area   Sit to  Stand [x]Functional  []Dysfunctional:  [x]Painful  []Non-Painful    Mild pain in glute area about 1/10   Squat []Functional  [x]Dysfunctional:  []Painful  [x]Non-Painful    Excessive anterior translation of the knees resulting in heels lifting from the ground. Unilateral upper extremity support utilized. Crepitus noted in knee with no pain.      Function:     Intake Outcome Measure for FOTO Lumbar spine Survey    Therapist reviewed FOTO scores for Rafa on 7/19/2023.   FOTO documents entered into EPIC - see Media section.    Intake Score: 42%         Treatment     Total Treatment time (time-based codes) separate from Evaluation: (0) minutes     Rafa received the treatments listed below:      THERAPEUTIC EXERCISES to develop strength, endurance, ROM, flexibility, posture, and core stabilization for (0) minutes including:    Performed Today:      Interventions DEFERRED today                Next Session:  Pelvic tilts  Abdominal bracing  Clamshells   Bridges   Standing hip 3 way   March     Patient Education and Home Exercises     Education provided: (0) minutes  PURPOSE: Patient educated on the impairments noted above and the effects of physical therapy intervention to improve overall condition and QOL.   EXERCISE: Patient was educated on all the above exercise prior/during/after for proper posture, positioning, and execution for safe performance with home exercise program.   STRENGTH: Patient educated on the importance of improved core and extremity strength in order to improve alignment of the spine and extremities with static positions and dynamic movement.     Written Home Exercises Provided: yes.  Exercises were reviewed and Rafa was able to demonstrate them prior to the end of the session.  Rafa demonstrated fair  understanding of the education provided. See EMR under Patient Instructions for exercises provided during therapy sessions.    Assessment     Rafa is a 62 y.o. male referred to  outpatient Physical Therapy with a medical diagnosis of spinal stenosis with accompanied weakness of hip and core musculature. Pt presents with impairments in the following categories: IMPAIRMENTS: ROM, strength, endurance, posture, gait mechanics, core strength and stability, and functional movement patterns    Patient referred to therapy for low back pain with radiating symptoms down R lower extremity. Patient had weakness in hips, core and legs. Pain increased with extension and decreased with forward flexion. Patient was independent with all  functional activities and pain free with all but transfers and sit to stand. Symptoms and objective consistent with spinal stenosis, as noted on imaging, leading to compensation to avoid pain leading to decreased strength in core and hips. Patient will benefit from core and hip strengthening as well as neuro reeducation to improve muscle recruitment patterns and decrease pain.     Pt prognosis is Good  Pt will benefit from skilled outpatient Physical Therapy to address the deficits stated above and in the chart below, provide pt/family education, and to maximize pt's level of independence.     Plan of care discussed with patient: Yes  Pt's spiritual, cultural and educational needs considered and patient is agreeable to the plan of care and goals as stated below:     Anticipated Barriers for therapy: co-morbidities and chronicity of condition    Medical Necessity is demonstrated by the following  History  Co-morbidities and personal factors that may impact the plan of care [] LOW: no personal factors / co-morbidities  [x] MODERATE: 1-2 personal factors / co-morbidities  [] HIGH: 3+ personal factors / co-morbidities    Moderate / High Support Documentation: Chronic condition, sedentary lifestyle, occupation, tobacco use  Past Medical History:   Diagnosis Date    Anxiety     BPH (benign prostatic hyperplasia)     Degenerative disc disease     Hyperlipidemia     Prediabetes      "Spinal stenosis, lumbar     Tobacco use         Examination  Body Structures and Functions, activity limitations and participation restrictions that may impact the plan of care [] LOW: addressing 1-2 elements  [x] MODERATE: 3+ elements  [] HIGH: 4+ elements (please support below)    Moderate / High Support Documentation: See above in "Current Level of Function"      Clinical Presentation [] LOW: stable  [x] MODERATE: Evolving  [] HIGH: Unstable     Decision Making/ Complexity Score: moderate         Short Term Goals:  6 weeks Status  Date Met   PAIN: Pt will report worst pain of 6/10 in order to progress toward max functional ability and improve quality of life. [x] Progressing  [] Met  [] Not Met    FUNCTION: Patient will demonstrate improved function as indicated by a score of greater than or equal to 47 out of 100 on FOTO. [x] Progressing  [] Met  [] Not Met    MOBILITY: Patient will improve AROM to 50% of stated goals, listed in objective measures above, in order to progress towards independence with functional activities.  [x] Progressing  [] Met  [] Not Met    STRENGTH: Patient will improve strength to 50% of stated goals, listed in objective measures above, in order to progress towards independence with functional activities. [x] Progressing  [] Met  [] Not Met    POSTURE: Patient will correct postural deviations in sitting and standing, to decrease pain and promote long term stability.  [x] Progressing  [] Met  [] Not Met    GAIT: Patient will demonstrate improved gait mechanics including decrease use of cane in order to improve functional mobility, improve quality of life, and decrease risk of further injury or fall.  [x] Progressing  [] Met  [] Not Met    HEP: Patient will demonstrate independence with HEP in order to progress toward functional independence. [x] Progressing  [] Met  [] Not Met      Long Term Goals:  12 weeks Status Date Met   PAIN: Pt will report worst pain of 3/10 in order to progress " toward max functional ability and improve quality of life [x] Progressing  [] Met  [] Not Met    FUNCTION: Patient will demonstrate improved function as indicated by a score of greater than or equal to 52 out of 100 on FOTO. [x] Progressing  [] Met  [] Not Met    MOBILITY: Patient will improve AROM to stated goals, listed in objective measures above, in order to return to maximal functional potential and improve quality of life.  [x] Progressing  [] Met  [] Not Met    STRENGTH: Patient will improve strength to stated goals, listed in objective measures above, in order to improve functional independence and quality of life.  [x] Progressing  [] Met  [] Not Met    GAIT: Patient will demonstrate normalized gait mechanics with minimal compensation in order to return to PLOF. [x] Progressing  [] Met  [] Not Met    Patient will return to normal ADL's, IADL's, community involvement, recreational activities, and work-related activities with less than or equal to 4/10 pain and maximal function.  [x] Progressing  [] Met  [] Not Met      Plan     Plan of care Certification: 7/19/2023 to 10/19/2023.    Outpatient Physical Therapy 1 times weekly for 12 weeks to include any combination of the following interventions: virtual visits, dry needling, modalities, electrical stimulation (IFC, Pre-Mod, Attended with Functional Dry Needling), Cervical/Lumbar Traction, Gait Training, Manual Therapy, Neuromuscular Re-ed, Patient Education, Self Care, Therapeutic Exercise, and Therapeutic Activites     Christopher Cantu, FIONA Akbar, PT, DPT

## 2023-07-21 PROBLEM — R26.89 FUNCTIONAL GAIT ABNORMALITY: Status: ACTIVE | Noted: 2023-07-21

## 2023-07-21 PROBLEM — R53.1 DECREASED STRENGTH, ENDURANCE, AND MOBILITY: Status: ACTIVE | Noted: 2023-07-21

## 2023-07-21 PROBLEM — Z74.09 DECREASED STRENGTH, ENDURANCE, AND MOBILITY: Status: ACTIVE | Noted: 2023-07-21

## 2023-07-21 PROBLEM — M25.60 DECREASED RANGE OF MOTION: Status: ACTIVE | Noted: 2023-07-21

## 2023-07-21 PROBLEM — R68.89 DECREASED STRENGTH, ENDURANCE, AND MOBILITY: Status: ACTIVE | Noted: 2023-07-21

## 2023-07-25 ENCOUNTER — CLINICAL SUPPORT (OUTPATIENT)
Dept: REHABILITATION | Facility: HOSPITAL | Age: 63
End: 2023-07-25
Payer: COMMERCIAL

## 2023-07-25 DIAGNOSIS — R26.89 FUNCTIONAL GAIT ABNORMALITY: Primary | ICD-10-CM

## 2023-07-25 DIAGNOSIS — R53.1 DECREASED STRENGTH, ENDURANCE, AND MOBILITY: ICD-10-CM

## 2023-07-25 DIAGNOSIS — M25.60 DECREASED RANGE OF MOTION: ICD-10-CM

## 2023-07-25 DIAGNOSIS — R68.89 DECREASED STRENGTH, ENDURANCE, AND MOBILITY: ICD-10-CM

## 2023-07-25 DIAGNOSIS — Z74.09 DECREASED STRENGTH, ENDURANCE, AND MOBILITY: ICD-10-CM

## 2023-07-25 PROCEDURE — 97110 THERAPEUTIC EXERCISES: CPT

## 2023-07-25 PROCEDURE — 97112 NEUROMUSCULAR REEDUCATION: CPT

## 2023-07-25 NOTE — PROGRESS NOTES
[facility]   Physical Therapy Treatment Note      Name: Rafa [facility] Number: [id number]    Therapy Diagnosis:   Encounter Diagnoses   Name Primary?    Functional gait abnormality Yes    Decreased strength, endurance, and mobility     Decreased range of motion      Physician: Briseyda Ruiz,*    Visit Date: 7/25/2023    Physician Orders: PT Eval and Treat  Medical Diagnosis from Referral:   M51.36 (ICD-10-CM) - DDD (degenerative disc disease), lumbar  M48.061 (ICD-10-CM) - Spinal stenosis of lumbar region, unspecified whether neurogenic claudication present  Evaluation Date: 7/19/2023  Authorization Period Expiration: 12/31/2023  Plan of Care Expiration: 10/19/2023  Progress Note Due: 8/19/2023  Visit # / Visits authorized: 1/19 (+1 for evaluation)   FOTO: 1/3 (last performed on 7/19/2023)    Precautions: Standard     Time In: 1350  Time Out: 1432  Total Billable Time (timed & untimed codes): 40 minutes    PTA Visit #: 0/5       Subjective     Pt reports: that he is feeling pretty good today. Reports that his pain is not that bad.   He was compliant with home exercise program.  Response to previous treatment: No adverse reactions   Functional change: No noted functional change    Pain: 3-4/10  Location: [x] Right  low back to hip and shooting nerve pain    Objective      Objective Measures updated at progress report unless specified.     Treatment     Rafa received the treatments listed below:      THERAPEUTIC EXERCISES to develop strength, endurance, ROM, flexibility, posture, and core stabilization for (10) minutes including:    Intervention Performed Today    Recumbent Bike x 6 minutes level 3   Lower Trunk Rotations x 4 minutes alternating                                   Plan for Next Visit:        NEUROMUSCULAR RE-EDUCATION ACTIVITIES to improve Balance, Coordination, Kinesthetic, Sense, Proprioception, and Posture for (30) minutes.  The following were  included:    Intervention Performed Today    Abdominal Bracing- Supine X  X  x 3 second holds 3x10 reps  + Marches 2x10 reps B  + Bridges 2x10 reps    Side Lying Clams Shells x 2x10 reps green theraband                                    Plan for Next Visit:      Next Session:  Pelvic tilts  Clamshells   Standing hip 3 way       Patient Education and Home Exercises       Education provided:   PURPOSE: Patient educated on the impairments noted above and the effects of physical therapy intervention to improve overall condition and QOL.   EXERCISE: Patient was educated on all the above exercise prior/during/after for proper posture, positioning, and execution for safe performance with home exercise program.   STRENGTH: Patient educated on the importance of improved core and extremity strength in order to improve alignment of the spine and extremities with static positions and dynamic movement.     Written Home Exercises Provided: Patient instructed to cont prior HEP. Exercises were reviewed and Rafa was able to demonstrate them prior to the end of the session.  Rafa demonstrated good  understanding of the education provided. See EMR under Patient Instructions for exercises provided during therapy sessions    Assessment     Patient tolerated treatment well today. Focused on improving lumbar mobility while working on improving abdominal engagement with incorporation of lower extremity strengthening as well. Patient reports resolution of symptoms by the end of the session and no pain reported throughout.     Rafa Is progressing well towards his goals.   Pt prognosis is Good.     Pt will continue to benefit from skilled outpatient physical therapy to address the deficits listed in the problem list box on initial evaluation, provide pt/family education and to maximize pt's level of independence in the home and community environment.     Pt's spiritual, cultural and educational needs considered and pt agreeable to  plan of care and goals.     Anticipated barriers to physical therapy: co-morbidities and chronicity of condition    Goals:      Short Term Goals:  6 weeks Status  Date Met   PAIN: Pt will report worst pain of 6/10 in order to progress toward max functional ability and improve quality of life. [x] Progressing  [] Met  [] Not Met     FUNCTION: Patient will demonstrate improved function as indicated by a score of greater than or equal to 47 out of 100 on FOTO. [x] Progressing  [] Met  [] Not Met     MOBILITY: Patient will improve AROM to 50% of stated goals, listed in objective measures above, in order to progress towards independence with functional activities.  [x] Progressing  [] Met  [] Not Met     STRENGTH: Patient will improve strength to 50% of stated goals, listed in objective measures above, in order to progress towards independence with functional activities. [x] Progressing  [] Met  [] Not Met     POSTURE: Patient will correct postural deviations in sitting and standing, to decrease pain and promote long term stability.  [x] Progressing  [] Met  [] Not Met     GAIT: Patient will demonstrate improved gait mechanics including decrease use of cane in order to improve functional mobility, improve quality of life, and decrease risk of further injury or fall.  [x] Progressing  [] Met  [] Not Met     HEP: Patient will demonstrate independence with HEP in order to progress toward functional independence. [x] Progressing  [] Met  [] Not Met        Long Term Goals:  12 weeks Status Date Met   PAIN: Pt will report worst pain of 3/10 in order to progress toward max functional ability and improve quality of life [x] Progressing  [] Met  [] Not Met     FUNCTION: Patient will demonstrate improved function as indicated by a score of greater than or equal to 52 out of 100 on FOTO. [x] Progressing  [] Met  [] Not Met     MOBILITY: Patient will improve AROM to stated goals, listed in objective measures above, in order to  return to maximal functional potential and improve quality of life.  [x] Progressing  [] Met  [] Not Met     STRENGTH: Patient will improve strength to stated goals, listed in objective measures above, in order to improve functional independence and quality of life.  [x] Progressing  [] Met  [] Not Met     GAIT: Patient will demonstrate normalized gait mechanics with minimal compensation in order to return to PLOF. [x] Progressing  [] Met  [] Not Met     Patient will return to normal ADL's, IADL's, community involvement, recreational activities, and work-related activities with less than or equal to 4/10 pain and maximal function.  [x] Progressing  [] Met  [] Not Met          Plan     Continue POC and frequency as planned. Continue to progress mobility and strengthening to tolerance.     Alyssia Akbar, PT, DPT

## 2023-08-08 ENCOUNTER — CLINICAL SUPPORT (OUTPATIENT)
Dept: REHABILITATION | Facility: HOSPITAL | Age: 63
End: 2023-08-08
Payer: COMMERCIAL

## 2023-08-08 DIAGNOSIS — Z74.09 DECREASED STRENGTH, ENDURANCE, AND MOBILITY: ICD-10-CM

## 2023-08-08 DIAGNOSIS — R68.89 DECREASED STRENGTH, ENDURANCE, AND MOBILITY: ICD-10-CM

## 2023-08-08 DIAGNOSIS — R26.89 FUNCTIONAL GAIT ABNORMALITY: Primary | ICD-10-CM

## 2023-08-08 DIAGNOSIS — M25.60 DECREASED RANGE OF MOTION: ICD-10-CM

## 2023-08-08 DIAGNOSIS — R53.1 DECREASED STRENGTH, ENDURANCE, AND MOBILITY: ICD-10-CM

## 2023-08-08 PROCEDURE — 97110 THERAPEUTIC EXERCISES: CPT

## 2023-08-08 PROCEDURE — 97112 NEUROMUSCULAR REEDUCATION: CPT

## 2023-08-08 NOTE — PROGRESS NOTES
[facility]   Physical Therapy Treatment Note      Name: Rafa [facility] Number: [id number]    Therapy Diagnosis:   Encounter Diagnoses   Name Primary?    Functional gait abnormality Yes    Decreased strength, endurance, and mobility     Decreased range of motion      Physician: Briseyda Ruiz,*    Visit Date: 8/8/2023    Physician Orders: PT Eval and Treat  Medical Diagnosis from Referral:   M51.36 (ICD-10-CM) - DDD (degenerative disc disease), lumbar  M48.061 (ICD-10-CM) - Spinal stenosis of lumbar region, unspecified whether neurogenic claudication present  Evaluation Date: 7/19/2023  Authorization Period Expiration: 12/31/2023  Plan of Care Expiration: 10/19/2023  Progress Note Due: 8/19/2023  Visit # / Visits authorized: 2/19 (+1 for evaluation)   FOTO: 1/3 (last performed on 7/19/2023)    Precautions: Standard     Time In: 1306  Time Out: 1357  Total Billable Time (timed & untimed codes): 51 minutes    PTA Visit #: 0/5     Subjective     Pt reports: that he has been sick the last couple of weeks which has limited his ability to perform his HEP exercises. Patient does report that his low back is feeling better and he is not really having any pain today.     He was compliant with home exercise program.  Response to previous treatment: No adverse reactions   Functional change: Less pain with all functional tasks     Pain: 0/10  Location: [x] Right  low back to hip and shooting nerve pain    Objective      Objective Measures updated at progress report unless specified.     Treatment     Rafa received the treatments listed below:      THERAPEUTIC EXERCISES to develop strength, endurance, ROM, flexibility, posture, and core stabilization for (10) minutes including:    Intervention Performed Today    Recumbent Bike x 6 minutes level 3   Lower Trunk Rotations x 4 minutes alternating                                   Plan for Next Visit:        NEUROMUSCULAR RE-EDUCATION  ACTIVITIES to improve Balance, Coordination, Kinesthetic, Sense, Proprioception, and Posture for (41) minutes.  The following were included:    Intervention Performed Today    Abdominal Bracing- Supine X  X  X  x 3 second holds 3x10 reps  + Hip Adduction Ball Squeezes 3x10 reps   + Marches 3x10 reps B  + Bridges 3x10 reps    Side Lying Clams Shells x 2x10 reps green theraband                                    Plan for Next Visit:      Next Session:  Pelvic tilts  Clamshells   Standing hip 3 way       Patient Education and Home Exercises       Education provided:   PURPOSE: Patient educated on the impairments noted above and the effects of physical therapy intervention to improve overall condition and QOL.   EXERCISE: Patient was educated on all the above exercise prior/during/after for proper posture, positioning, and execution for safe performance with home exercise program.   STRENGTH: Patient educated on the importance of improved core and extremity strength in order to improve alignment of the spine and extremities with static positions and dynamic movement.     Written Home Exercises Provided: Patient instructed to cont prior HEP. Exercises were reviewed and Rafa was able to demonstrate them prior to the end of the session.  Rafa demonstrated good  understanding of the education provided. See EMR under Patient Instructions for exercises provided during therapy sessions    Assessment     Patient tolerated treatment well today. Continued to work on core strengthening working more on building endurance while reminding patient of the importance of breathing throughout. Did not progress too much today due to recent illness, will progress more next visit if tolerated.     Rafa Is progressing well towards his goals.   Pt prognosis is Good.     Pt will continue to benefit from skilled outpatient physical therapy to address the deficits listed in the problem list box on initial evaluation, provide pt/family  education and to maximize pt's level of independence in the home and community environment.     Pt's spiritual, cultural and educational needs considered and pt agreeable to plan of care and goals.     Anticipated barriers to physical therapy: co-morbidities and chronicity of condition    Goals:      Short Term Goals:  6 weeks Status  Date Met   PAIN: Pt will report worst pain of 6/10 in order to progress toward max functional ability and improve quality of life. [x] Progressing  [] Met  [] Not Met     FUNCTION: Patient will demonstrate improved function as indicated by a score of greater than or equal to 47 out of 100 on FOTO. [x] Progressing  [] Met  [] Not Met     MOBILITY: Patient will improve AROM to 50% of stated goals, listed in objective measures above, in order to progress towards independence with functional activities.  [x] Progressing  [] Met  [] Not Met     STRENGTH: Patient will improve strength to 50% of stated goals, listed in objective measures above, in order to progress towards independence with functional activities. [x] Progressing  [] Met  [] Not Met     POSTURE: Patient will correct postural deviations in sitting and standing, to decrease pain and promote long term stability.  [x] Progressing  [] Met  [] Not Met     GAIT: Patient will demonstrate improved gait mechanics including decrease use of cane in order to improve functional mobility, improve quality of life, and decrease risk of further injury or fall.  [x] Progressing  [] Met  [] Not Met     HEP: Patient will demonstrate independence with HEP in order to progress toward functional independence. [x] Progressing  [] Met  [] Not Met        Long Term Goals:  12 weeks Status Date Met   PAIN: Pt will report worst pain of 3/10 in order to progress toward max functional ability and improve quality of life [x] Progressing  [] Met  [] Not Met     FUNCTION: Patient will demonstrate improved function as indicated by a score of greater than or  equal to 52 out of 100 on FOTO. [x] Progressing  [] Met  [] Not Met     MOBILITY: Patient will improve AROM to stated goals, listed in objective measures above, in order to return to maximal functional potential and improve quality of life.  [x] Progressing  [] Met  [] Not Met     STRENGTH: Patient will improve strength to stated goals, listed in objective measures above, in order to improve functional independence and quality of life.  [x] Progressing  [] Met  [] Not Met     GAIT: Patient will demonstrate normalized gait mechanics with minimal compensation in order to return to PLOF. [x] Progressing  [] Met  [] Not Met     Patient will return to normal ADL's, IADL's, community involvement, recreational activities, and work-related activities with less than or equal to 4/10 pain and maximal function.  [x] Progressing  [] Met  [] Not Met          Plan     Continue POC and frequency as planned. Continue to progress mobility and strengthening to tolerance.     Alyssia Akbar, PT, DPT

## 2023-08-31 ENCOUNTER — OFFICE VISIT (OUTPATIENT)
Dept: PAIN MEDICINE | Facility: CLINIC | Age: 63
End: 2023-08-31
Payer: COMMERCIAL

## 2023-08-31 VITALS
HEIGHT: 72 IN | RESPIRATION RATE: 18 BRPM | SYSTOLIC BLOOD PRESSURE: 160 MMHG | HEART RATE: 73 BPM | BODY MASS INDEX: 29.74 KG/M2 | DIASTOLIC BLOOD PRESSURE: 89 MMHG | WEIGHT: 219.56 LBS

## 2023-08-31 DIAGNOSIS — M54.16 LUMBAR RADICULOPATHY: ICD-10-CM

## 2023-08-31 DIAGNOSIS — M47.816 LUMBAR SPONDYLOSIS: ICD-10-CM

## 2023-08-31 DIAGNOSIS — M43.16 SPONDYLOLISTHESIS OF LUMBAR REGION: ICD-10-CM

## 2023-08-31 DIAGNOSIS — M51.36 DDD (DEGENERATIVE DISC DISEASE), LUMBAR: Primary | ICD-10-CM

## 2023-08-31 PROCEDURE — 99213 PR OFFICE/OUTPT VISIT, EST, LEVL III, 20-29 MIN: ICD-10-PCS | Mod: S$GLB,,, | Performed by: ANESTHESIOLOGY

## 2023-08-31 PROCEDURE — 99999 PR PBB SHADOW E&M-EST. PATIENT-LVL III: CPT | Mod: PBBFAC,,, | Performed by: ANESTHESIOLOGY

## 2023-08-31 PROCEDURE — 99213 OFFICE O/P EST LOW 20 MIN: CPT | Mod: S$GLB,,, | Performed by: ANESTHESIOLOGY

## 2023-08-31 PROCEDURE — 99999 PR PBB SHADOW E&M-EST. PATIENT-LVL III: ICD-10-PCS | Mod: PBBFAC,,, | Performed by: ANESTHESIOLOGY

## 2023-08-31 NOTE — PROGRESS NOTES
Est Patient Interventional Pain Note (Follow up Visit)    Referring Physician: No ref. provider found    PCP: Jaky Quinn MD    Chief Complaint:   Chief Complaint   Patient presents with    Low-back Pain     Patient has pain in lower back, very minimal.  Pain scale 1/10        SUBJECTIVE:  Interval History (08/31/2023):  Patient returns to clinic for evaluation of lower back pain.  Since last being seen patient reports significant improvement in lower back pain.  Patient reports occasional aching pain in his right lower back here but reports this is mild.  Patient denies any significant radiation to his bilateral lower extremities.  Pain is worse with prolonged walking lifting, better with stretching and heat.  Pain is currently rated 0/10. Denies any fevers, chills, changes in gait, saddle anesthesia, or bowel and bladder incontinence        Interval History (7/3/2023):  Rafa Padilla presents today for follow-up visit.  Patient was last seen on 5/17/2023. At that visit, the plan was to restart Lyrica and increase to achieve therapeutic dose and switch to Mobic to decrease overuse of Ibuprofen. He reports excellent relief in radicular pain with Lyrica 200 mg TID, up until recently. He reports he had a pain flair recently with overdoing it with household chores. He reports he did a lot of sweeping, mopping, and cleaning over the weekend. His son is present today. States he also helps take care of his wife who is over 200 pounds and worries that this may also be exacerbating his pain. Patient reports pain as 7/10 today.'    Interval History (5/17/2023):  Rafa Padilla presents today for follow-up visit.  Patient was last seen on 4/12/2023. At that visit, the plan was to start Lyrica and consider lumbar DOTTIE. Patient is not interested in injections at this time. He reports continued low back pain with radiation into his right lower extremity. Pain is worsened with prolonged standing, sitting, or laying down.  He was previously started on Lyrica 50 mg BID, but states he discontinued because it was not helping. Takes Ibuprofen, 2 tablets every 3-4 hours per day. Patient reports pain as 8/10 today.    X-ray hip 02/24/2023  FINDINGS:  The bony pelvis is intact.  The right femoral head is well seated within the acetabulum.  No acute fracture or dislocation.  The hip joint space is well preserved.  Metallic density seen projecting over the right hemipelvis possibly representing a surgical staple.  This was not seen on prior films from 10/19/2022.    Initial HPI 04/12/2023  Rafa Padilla is a 62 y.o. male who presents to the clinic for the evaluation of lower back pain.  Patient reports 4 month history of lower back pain.  Patient denies any inciting events.  Patient denies any previous traumas to his lower back.  Patient denies any previous surgical intervention on his lumbar spine.  Pain is described as an aching sharp pain that starts in his right lower back and then radiates down the lateral and posterior aspect of his right lower extremity to his foot.  Pain is worse with prolonged standing and extension, better with flexion and heat.  Pain is rated a 7/10 Denies any fevers, chills, saddle anesthesia, or bowel and bladder incontinence      Non-Pharmacologic Treatments:  Physical Therapy/Home Exercise: yes  Ice/Heat:yes  TENS: no  Acupuncture: no  Massage: yes  Chiropractic: no        Previous Pain Medications:  NSAIDs, Tylenol, muscle relaxers, topicals     report:  Reviewed and consistent with medication use as prescribed.    Pain Procedures:   none      Imaging:     Results for orders placed during the hospital encounter of 10/19/22    MRI Lumbar Spine Without Contrast    Narrative  EXAMINATION:  MRI LUMBAR SPINE WITHOUT CONTRAST    CLINICAL HISTORY:  Spinal stenosis, lumbar;numbness bilateral upper thighs x 2 occasions; Other intervertebral disc degeneration, lumbar region    TECHNIQUE:  Multiplanar, multisequence  MR images were acquired from the thoracolumbar junction to the sacrum without the administration of contrast.    COMPARISON:  None.    FINDINGS:  Alignment: Slight retrolisthesis of L2 on L3 and L3 on L4.    Vertebrae: Mild edema is present within the L2-L3 and L3-L4 vertebral bodies adjacent to the discs, degenerative in etiology.    Discs: Multilevel disc desiccation is present.    Cord: No abnormal signal within the cord.  Conus terminates at L1    Degenerative findings:    T12-L1: No significant disc disease, neural foraminal narrowing or spinal canal stenosis.    L1-L2: Disc bulge and left facet arthropathy contributes to mild left neural foraminal narrowing without right neural foraminal narrowing or spinal canal stenosis.    L2-L3: Retrolisthesis, disc bulge and facet arthropathy with moderate left and mild right neural foraminal narrowing and mild spinal canal stenosis.    L3-L4: Retrolisthesis, disc bulge and facet arthropathy contributes to moderate right and mild left neural foraminal narrowing and mild spinal canal stenosis.    L4-L5: Disc bulge and facet arthropathy with mild bilateral neural foraminal narrowing without spinal canal stenosis    L5-S1: Disc bulge and facet arthropathy with mild left greater than right neural foraminal narrowing without spinal canal stenosis.    Paraspinal muscles & soft tissues: Unremarkable.    Impression  Multilevel multifactorial degenerative changes are present greatest at L2-L3 with moderate left neural foraminal narrowing and L3-L4 with moderate right neural foraminal narrowing.      Electronically signed by: Rey Conde  Date:    10/19/2022  Time:    18:26    Results for orders placed during the hospital encounter of 09/19/22    X-Ray Lumbar Complete Including Flex And Ext    Narrative  EXAMINATION:  XR LUMBAR SPINE 5 VIEW WITH FLEX AND EXT    INDICATION:  Other intervertebral disc degeneration, lumbar region    COMPARISON:  Lumbar spine radiographs from  08/31/2012    FINDINGS:  AP, oblique, lateral views of the lumbar spine are obtained with flexion and extension views.  5 non-rib-bearing lumbar type vertebral bodies are present.  There is 1-2 mm retrolisthesis of L2 on L3 and L3 on L4.  No other anterolisthesis or retrolisthesis.  There is no abnormal motion with dynamic maneuvers.  Mild-to-moderate degenerative disc changes are present throughout the lumbar spine, worst at L2-L3.    Impression  1. Multilevel degenerative disc changes, worst at L2-L3.      Electronically signed by: Radhames Braxton MD  Date:    09/19/2022  Time:    11:34  t.  .      Past Medical History:   Diagnosis Date    Anxiety     BPH (benign prostatic hyperplasia)     Degenerative disc disease     Hyperlipidemia     Prediabetes     Spinal stenosis, lumbar     Tobacco use      History reviewed. No pertinent surgical history.  Social History     Socioeconomic History    Marital status:      Spouse name: Aurora    Number of children: 2   Occupational History     Employer: [employer]   Tobacco Use    Smoking status: Every Day     Current packs/day: 0.50     Average packs/day: 0.5 packs/day for 40.0 years (20.0 ttl pk-yrs)     Types: Cigarettes    Smokeless tobacco: Never   Substance and Sexual Activity    Alcohol use: Yes     Comment: occasionally    Drug use: No    Sexual activity: Yes     Partners: Female     Birth control/protection: None     Family History   Problem Relation Age of Onset    Arthritis Mother     COPD Mother     Diabetes Mother     Heart disease Mother     Hypertension Mother     No Known Problems Father        Review of patient's allergies indicates:  No Known Allergies    Current Outpatient Medications   Medication Sig    aspirin 81 mg Tab Take by mouth. 1 Tablet Oral Every day    atorvastatin (LIPITOR) 20 MG tablet Take 1 tablet (20 mg total) by mouth once daily.    buPROPion (WELLBUTRIN XL) 150 MG TB24 tablet Take 1 tablet (150 mg total) by mouth once  daily.    cyclobenzaprine (FLEXERIL) 10 MG tablet Take 1 tablet (10 mg total) by mouth every evening. May cause drowsiness.    ibuprofen (ADVIL,MOTRIN) 600 MG tablet Take 1 tablet (600 mg total) by mouth 3 (three) times daily as needed for Pain.    nicotine (NICODERM CQ) 14 mg/24 hr Place 1 patch onto the skin once daily.    pregabalin (LYRICA) 100 MG capsule Take 1 capsule (100 mg total) by mouth 3 (three) times daily.    sildenafil (VIAGRA) 100 MG tablet 1/2 or 1 tab po q 24 hours prn sexual activity    tamsulosin (FLOMAX) 0.4 mg Cap Take 2 capsules (0.8 mg total) by mouth once daily.     No current facility-administered medications for this visit.         ROS  Review of Systems   Constitutional:  Negative for activity change, appetite change and fever.   Respiratory:  Negative for cough, chest tightness, shortness of breath, wheezing and stridor.    Cardiovascular:  Negative for chest pain, palpitations and leg swelling.   Gastrointestinal:  Negative for abdominal pain, blood in stool, constipation, diarrhea, nausea and vomiting.   Endocrine: Negative for polydipsia, polyphagia and polyuria.   Genitourinary:  Positive for urgency. Negative for dysuria and hematuria.   Musculoskeletal:  Positive for back pain and myalgias. Negative for arthralgias, gait problem, joint swelling, neck pain and neck stiffness.   Skin:  Negative for rash.   Allergic/Immunologic: Negative for food allergies.   Neurological:  Positive for weakness. Negative for dizziness, tremors, seizures, syncope, facial asymmetry, speech difficulty, light-headedness, numbness and headaches.   Psychiatric/Behavioral:  Negative for agitation, hallucinations, self-injury and suicidal ideas. The patient is not nervous/anxious and is not hyperactive.             OBJECTIVE:  BP (!) 160/89   Pulse 73   Resp 18   Ht 6' (1.829 m)   Wt 99.6 kg (219 lb 9.3 oz)   BMI 29.78 kg/m²         Physical Exam  Constitutional:       General: He is not in acute  distress.     Appearance: Normal appearance. He is not ill-appearing.   HENT:      Head: Normocephalic and atraumatic.   Eyes:      Extraocular Movements: Extraocular movements intact.      Pupils: Pupils are equal, round, and reactive to light.   Cardiovascular:      Pulses: Normal pulses.   Pulmonary:      Effort: Pulmonary effort is normal.   Abdominal:      General: Abdomen is flat.      Palpations: Abdomen is soft.   Skin:     General: Skin is warm and dry.      Capillary Refill: Capillary refill takes less than 2 seconds.   Neurological:      General: No focal deficit present.      Mental Status: He is alert and oriented to person, place, and time.      Sensory: No sensory deficit.      Motor: No weakness or abnormal muscle tone.      Gait: Gait normal.      Deep Tendon Reflexes: Reflexes normal.   Psychiatric:         Mood and Affect: Mood normal.         Behavior: Behavior normal.         Thought Content: Thought content normal.           Musculoskeletal:    Lumbar Exam  Incision: no  Pain with Flexion: no  Pain with Extension: no  ROM:  FROM  Paraspinous TTP:  Right lumbar paraspinous  Facet TTP:  L5-S1  Facet Loading:  Negative bilaterally  SLR:  Negative bilaterally  SIJ TTP:  Negative bilaterally  LUIS ARMANDO:  Negative bilaterally      LABS:  Lab Results   Component Value Date    WBC 7.25 09/15/2022    HGB 16.3 09/15/2022    HCT 49.3 09/15/2022    MCV 93 09/15/2022     09/15/2022       CMP  Sodium   Date Value Ref Range Status   09/15/2022 140 136 - 145 mmol/L Final     Potassium   Date Value Ref Range Status   09/15/2022 4.4 3.5 - 5.1 mmol/L Final     Chloride   Date Value Ref Range Status   09/15/2022 105 95 - 110 mmol/L Final     CO2   Date Value Ref Range Status   09/15/2022 27 23 - 29 mmol/L Final     Glucose   Date Value Ref Range Status   09/15/2022 107 70 - 110 mg/dL Final     BUN   Date Value Ref Range Status   09/15/2022 16 8 - 23 mg/dL Final     Creatinine   Date Value Ref Range Status    09/15/2022 0.9 0.5 - 1.4 mg/dL Final     Calcium   Date Value Ref Range Status   09/15/2022 10.0 8.7 - 10.5 mg/dL Final     Total Protein   Date Value Ref Range Status   09/15/2022 7.2 6.0 - 8.4 g/dL Final     Albumin   Date Value Ref Range Status   09/15/2022 4.5 3.5 - 5.2 g/dL Final     Total Bilirubin   Date Value Ref Range Status   09/15/2022 0.5 0.1 - 1.0 mg/dL Final     Comment:     For infants and newborns, interpretation of results should be based  on gestational age, weight and in agreement with clinical  observations.    Premature Infant recommended reference ranges:  Up to 24 hours.............<8.0 mg/dL  Up to 48 hours............<12.0 mg/dL  3-5 days..................<15.0 mg/dL  6-29 days.................<15.0 mg/dL       Alkaline Phosphatase   Date Value Ref Range Status   09/15/2022 43 (L) 55 - 135 U/L Final     AST   Date Value Ref Range Status   09/15/2022 22 10 - 40 U/L Final     ALT   Date Value Ref Range Status   09/15/2022 25 10 - 44 U/L Final     Anion Gap   Date Value Ref Range Status   09/15/2022 8 8 - 16 mmol/L Final     eGFR if    Date Value Ref Range Status   04/22/2021 >60.0 >60 mL/min/1.73 m^2 Final     eGFR if non    Date Value Ref Range Status   04/22/2021 >60.0 >60 mL/min/1.73 m^2 Final     Comment:     Calculation used to obtain the estimated glomerular filtration  rate (eGFR) is the CKD-EPI equation.          Lab Results   Component Value Date    HGBA1C 6.1 (H) 09/15/2022             ASSESSMENT:       62 y.o. year old male with lower back pain, consistent with     1. DDD (degenerative disc disease), lumbar        2. Lumbar spondylosis        3. Lumbar radiculopathy        4. Spondylolisthesis of lumbar region                DDD (degenerative disc disease), lumbar    Lumbar spondylosis    Lumbar radiculopathy    Spondylolisthesis of lumbar region                   PLAN:   - Interventions:   None at this time.  e breakfast.    - Anticoagulation use:    yes aspirin    - Medications:   Continue Lyrica 100 mg 3 times daily   Continue Flexeril (cyclobenzaprine) 10mg BID PRN muscle spasms    Continue ibuprofen 200 mg 3 times a day p.r.n.    - Therapy:    Patient has completed formal physical therapy with mild relief. New orders placed to restart physical therapy for low back     - Imaging/Diagnostic:   MRI of lumbar spine reviewed and findings discussed with patient.  Significant for grade 1 retrolisthesis of L2 upon L3 with diffuse foraminal stenosis    - Consults:   none at this time    - Counseled patient regarding the importance of physical therapy    - Patient Questions: Answered all of the patient's questions regarding diagnosis, therapy, and treatment     This condition does not require this patient to take time off of work, and the primary goal of our Pain Management services is to improve the patient's functional capacity.     - Follow up visit: return to clinic p.r.n.        The above plan and management options were discussed at length with patient. Patient is in agreement with the above and verbalized understanding.    I discussed the goals of interventional chronic pain management with the patient on today's visit.  I explained the utility of injections for diagnostic and therapeutic purposes.  We discussed a multimodal approach to pain including treating the patient's given worst pain at any given time.  We will use a systematic approach to addressing pain.  We will also adopt a multimodal approach that includes injections, adjuvant medications, physical therapy, at times psychiatry.  There may be a limited role for opioid use intermittently in the treatment of pain, more particularly for acute pain although no one approach can be used as a sole treatment modality.    I emphasized the importance of regular exercise, core strengthening and stretching, diet and weight loss as a cornerstone of long-term pain management.      Clinton Monaco,  MD  Interventional Pain Management  [facility]    Disclaimer:  This note was prepared using voice recognition system and is likely to have sound alike errors that may have been overlooked even after proof reading.  Please call me with any questions

## 2023-09-05 NOTE — PROGRESS NOTES
Subjective:      Patient ID: Rafa Padilla is a 56 y.o. male.    Chief Complaint: Cyst (on back of head/neck)    57 yo with Patient Active Problem List:     BPH (benign prostatic hyperplasia)     Hyperlipidemia     Spinal stenosis, lumbar     DDD (degenerative disc disease), lumbar     Anxiety     Tobacco use     Prediabetes     DJD (degenerative joint disease), cervical     Myofascial pain    Here today for annual exam.  Compliant with meds without significant side effects. Feeling well and in usu state of health. 15 pkyr tob. Reports cyst to neck for years. Waxes and wanes.       Review of Systems   Constitutional: Negative for chills and fever.   HENT: Negative for ear pain and sore throat.    Respiratory: Negative for cough.    Cardiovascular: Negative for chest pain.   Gastrointestinal: Negative for abdominal pain and blood in stool.   Genitourinary: Negative for dysuria and hematuria.   Neurological: Negative for seizures and syncope.     Objective:   BP (!) 136/90 (BP Location: Right arm, Patient Position: Sitting)   Pulse 69   Temp 96.7 °F (35.9 °C) (Tympanic)   Ht 6' (1.829 m)   Wt 96.9 kg (213 lb 10 oz)   SpO2 99%   BMI 28.97 kg/m²     Physical Exam   Constitutional: He is oriented to person, place, and time. He appears well-developed and well-nourished. No distress.   HENT:   Head: Normocephalic and atraumatic.   Mouth/Throat: Oropharynx is clear and moist.   Eyes: EOM are normal. Pupils are equal, round, and reactive to light.   Neck: Neck supple. No thyromegaly present.   Cardiovascular: Normal rate and regular rhythm.    Pulmonary/Chest: Breath sounds normal. He has no wheezes. He has no rales.   Abdominal: Soft. Bowel sounds are normal. There is no tenderness.   Musculoskeletal: He exhibits no edema.   Lymphadenopathy:     He has no cervical adenopathy.   Neurological: He is alert and oriented to person, place, and time.   Skin: Skin is warm and dry.   Sebaceous cyst noted to neck and upper  back. No ttp, d/c, or fluctuance.    Psychiatric: He has a normal mood and affect. His behavior is normal.       Assessment:     1. Routine general medical examination at a health care facility    2. Benign prostatic hyperplasia without lower urinary tract symptoms    3. Hyperlipidemia, unspecified hyperlipidemia type    4. Tobacco use    5. Prediabetes    6. Sebaceous cyst      Plan:   Routine general medical examination at a health care facility    Benign prostatic hyperplasia without lower urinary tract symptoms  Comments:  Stable and controlled  Orders:  -     tamsulosin (FLOMAX) 0.4 mg Cp24; TAKE 2 CAPSULES(0.8 MG) BY MOUTH EVERY DAY  Dispense: 180 capsule; Refill: 3    Hyperlipidemia, unspecified hyperlipidemia type    Tobacco use  Comments:  Patient not ready to quit patient declines smoking cessation program    Prediabetes  -     Hemoglobin A1c; Future; Expected date: 08/21/2017    Sebaceous cyst  -     Ambulatory referral to Dermatology      Needs to do labs as previously ordered.  Heart healthy diet and regular exercise.  Health maintenance reviewed    Lab Frequency Next Occurrence   Comprehensive metabolic panel Once 01/09/2017   CBC auto differential Once 01/09/2017   TSH Once 01/09/2017   Vitamin D Once 01/09/2017   PSA, Screening Once 01/09/2017   Lipid panel Once 01/09/2017         Return in about 4 weeks (around 9/18/2017), or if symptoms worsen or fail to improve.   Home

## 2023-09-21 DIAGNOSIS — N40.0 BENIGN PROSTATIC HYPERPLASIA WITHOUT LOWER URINARY TRACT SYMPTOMS: ICD-10-CM

## 2023-09-21 RX ORDER — TAMSULOSIN HYDROCHLORIDE 0.4 MG/1
0.8 CAPSULE ORAL
Qty: 180 CAPSULE | Refills: 0 | Status: SHIPPED | OUTPATIENT
Start: 2023-09-21 | End: 2023-12-26

## 2023-09-21 NOTE — TELEPHONE ENCOUNTER
No care due was identified.  [facility] Embedded Care Due Messages. Reference number: [id number].   9/21/2023 3:51:08 AM CDT

## 2023-09-21 NOTE — TELEPHONE ENCOUNTER
Refill Decision Note   Rafa Yarbroughlory  is requesting a refill authorization.  Brief Assessment and Rationale for Refill:  Approve     Medication Therapy Plan:         Comments:     Note composed:7:46 AM 09/21/2023             Appointments     Last Visit   9/15/2022 Jaky Quinn MD   Next Visit   Visit date not found Jaky Quinn MD

## 2023-10-19 ENCOUNTER — CLINICAL SUPPORT (OUTPATIENT)
Dept: SMOKING CESSATION | Facility: CLINIC | Age: 63
End: 2023-10-19

## 2023-10-19 DIAGNOSIS — F17.200 NICOTINE DEPENDENCE: Primary | ICD-10-CM

## 2023-10-19 PROCEDURE — 99407 BEHAV CHNG SMOKING > 10 MIN: CPT | Mod: S$GLB,,, | Performed by: GENERAL PRACTICE

## 2023-10-19 PROCEDURE — 99999 PR PBB SHADOW E&M-EST. PATIENT-LVL I: ICD-10-PCS | Mod: PBBFAC,,,

## 2023-10-19 PROCEDURE — 99999 PR PBB SHADOW E&M-EST. PATIENT-LVL I: CPT | Mod: PBBFAC,,,

## 2023-10-19 PROCEDURE — 99407 PR TOBACCO USE CESSATION INTENSIVE >10 MINUTES: ICD-10-PCS | Mod: S$GLB,,, | Performed by: GENERAL PRACTICE

## 2023-10-19 NOTE — PROGRESS NOTES
Spoke with patient today in regard to smoking cessation progress 12 month telephone follow up, he states that he is not tobacco free.  Patient is currently smoking 12 cigarettes daily.  Commended patient on the accomplishments thus far.  Informed patient of benefit period, future follow up, and contact information if any further help or support is needed.  Will complete smart form for 12 month follow up on Quit attempt #1 and resolve episode.

## 2023-12-25 DIAGNOSIS — N40.0 BENIGN PROSTATIC HYPERPLASIA WITHOUT LOWER URINARY TRACT SYMPTOMS: ICD-10-CM

## 2023-12-25 NOTE — TELEPHONE ENCOUNTER
Care Due:                  Date            Visit Type   Department     Provider  --------------------------------------------------------------------------------                                EP -                              PRIMARY      [facility] PRIMARY  Last Visit: 09-      CARE ([facility])   CARE           Jaky Quinn  Next Visit: None Scheduled  None         None Found                                                            Last  Test          Frequency    Reason                     Performed    Due Date  --------------------------------------------------------------------------------    Office Visit  12 months..  atorvastatin, buPROPion,   09-   09-                             nicotine, tamsulosin.....    CMP.........  12 months..  atorvastatin.............  09-   09-    Lipid Panel.  12 months..  atorvastatin.............  09-   09-    [facility] Embedded Care Due Messages. Reference number: [id number].   12/25/2023 3:45:32 AM CST

## 2023-12-26 ENCOUNTER — PATIENT MESSAGE (OUTPATIENT)
Dept: PRIMARY CARE CLINIC | Facility: CLINIC | Age: 63
End: 2023-12-26
Payer: COMMERCIAL

## 2023-12-26 DIAGNOSIS — N40.0 BENIGN PROSTATIC HYPERPLASIA WITHOUT LOWER URINARY TRACT SYMPTOMS: ICD-10-CM

## 2023-12-26 RX ORDER — TAMSULOSIN HYDROCHLORIDE 0.4 MG/1
0.8 CAPSULE ORAL
Qty: 180 CAPSULE | Refills: 0 | OUTPATIENT
Start: 2023-12-26

## 2023-12-26 RX ORDER — TAMSULOSIN HYDROCHLORIDE 0.4 MG/1
0.8 CAPSULE ORAL
Qty: 180 CAPSULE | Refills: 0 | Status: SHIPPED | OUTPATIENT
Start: 2023-12-26 | End: 2024-03-28

## 2023-12-26 NOTE — TELEPHONE ENCOUNTER
----- Message from Michelle Acevedo sent at 12/26/2023 12:55 PM CST -----  Type:  RX Refill Request    Who Called: pt   Refill or New Rx:refill  RX Name and Strength:tamsulosin (FLOMAX) 0.4 mg Cap  How is the patient currently taking it? (ex. 1XDay):tamsulosin (FLOMAX) 0.4 mg Cap  Is this a 30 day or 90 day RX:180  Preferred Pharmacy with phone number:[id number] - [place] - [address] AT [address] [address]  Phone: [phone] Fax: [phone]  Would the patient rather a call back or a response via [facility]? call  Best Call Back Number:[phone]  Additional Information:

## 2023-12-26 NOTE — TELEPHONE ENCOUNTER
No care due was identified.  [facility] Embedded Care Due Messages. Reference number: [id number].   12/26/2023 12:38:36 PM CST

## 2023-12-31 DIAGNOSIS — E78.5 HYPERLIPIDEMIA, UNSPECIFIED HYPERLIPIDEMIA TYPE: ICD-10-CM

## 2023-12-31 NOTE — TELEPHONE ENCOUNTER
No care due was identified.  [facility] Embedded Care Due Messages. Reference number: [id number].   12/31/2023 11:16:53 AM CST

## 2024-01-02 RX ORDER — ATORVASTATIN CALCIUM 20 MG/1
20 TABLET, FILM COATED ORAL
Qty: 90 TABLET | Refills: 0 | Status: SHIPPED | OUTPATIENT
Start: 2024-01-02

## 2024-02-20 ENCOUNTER — LAB VISIT (OUTPATIENT)
Dept: LAB | Facility: HOSPITAL | Age: 64
End: 2024-02-20
Attending: FAMILY MEDICINE
Payer: COMMERCIAL

## 2024-02-20 ENCOUNTER — OFFICE VISIT (OUTPATIENT)
Dept: PRIMARY CARE CLINIC | Facility: CLINIC | Age: 64
End: 2024-02-20
Payer: COMMERCIAL

## 2024-02-20 VITALS
DIASTOLIC BLOOD PRESSURE: 86 MMHG | SYSTOLIC BLOOD PRESSURE: 162 MMHG | HEIGHT: 72 IN | OXYGEN SATURATION: 96 % | RESPIRATION RATE: 19 BRPM | TEMPERATURE: 98 F | BODY MASS INDEX: 30.99 KG/M2 | WEIGHT: 228.81 LBS | HEART RATE: 88 BPM

## 2024-02-20 DIAGNOSIS — Z12.11 COLON CANCER SCREENING: ICD-10-CM

## 2024-02-20 DIAGNOSIS — I10 HYPERTENSION, UNSPECIFIED TYPE: ICD-10-CM

## 2024-02-20 DIAGNOSIS — Z87.891 PERSONAL HISTORY OF TOBACCO USE, PRESENTING HAZARDS TO HEALTH: ICD-10-CM

## 2024-02-20 DIAGNOSIS — N40.0 BENIGN PROSTATIC HYPERPLASIA WITHOUT LOWER URINARY TRACT SYMPTOMS: ICD-10-CM

## 2024-02-20 DIAGNOSIS — M48.061 SPINAL STENOSIS OF LUMBAR REGION, UNSPECIFIED WHETHER NEUROGENIC CLAUDICATION PRESENT: ICD-10-CM

## 2024-02-20 DIAGNOSIS — E78.5 HYPERLIPIDEMIA, UNSPECIFIED HYPERLIPIDEMIA TYPE: ICD-10-CM

## 2024-02-20 DIAGNOSIS — Z23 NEED FOR TDAP VACCINATION: ICD-10-CM

## 2024-02-20 DIAGNOSIS — Z23 NEED FOR SHINGLES VACCINE: ICD-10-CM

## 2024-02-20 DIAGNOSIS — R35.0 URINARY FREQUENCY: ICD-10-CM

## 2024-02-20 DIAGNOSIS — Z00.00 ROUTINE GENERAL MEDICAL EXAMINATION AT A HEALTH CARE FACILITY: Primary | ICD-10-CM

## 2024-02-20 DIAGNOSIS — Z00.00 ROUTINE GENERAL MEDICAL EXAMINATION AT A HEALTH CARE FACILITY: ICD-10-CM

## 2024-02-20 DIAGNOSIS — R73.03 PREDIABETES: ICD-10-CM

## 2024-02-20 LAB
ALBUMIN SERPL BCP-MCNC: 4.2 G/DL (ref 3.5–5.2)
ALBUMIN/CREAT UR: 16.9 UG/MG (ref 0–30)
ALP SERPL-CCNC: 53 U/L (ref 55–135)
ALT SERPL W/O P-5'-P-CCNC: 35 U/L (ref 10–44)
ANION GAP SERPL CALC-SCNC: 8 MMOL/L (ref 8–16)
AST SERPL-CCNC: 27 U/L (ref 10–40)
BASOPHILS # BLD AUTO: 0.1 K/UL (ref 0–0.2)
BASOPHILS NFR BLD: 1.4 % (ref 0–1.9)
BILIRUB SERPL-MCNC: 0.4 MG/DL (ref 0.1–1)
BUN SERPL-MCNC: 15 MG/DL (ref 8–23)
CALCIUM SERPL-MCNC: 10.4 MG/DL (ref 8.7–10.5)
CHLORIDE SERPL-SCNC: 106 MMOL/L (ref 95–110)
CHOLEST SERPL-MCNC: 137 MG/DL (ref 120–199)
CHOLEST/HDLC SERPL: 3 {RATIO} (ref 2–5)
CO2 SERPL-SCNC: 28 MMOL/L (ref 23–29)
COMPLEXED PSA SERPL-MCNC: 5.1 NG/ML (ref 0–4)
CREAT SERPL-MCNC: 1 MG/DL (ref 0.5–1.4)
CREAT UR-MCNC: 295 MG/DL (ref 23–375)
DIFFERENTIAL METHOD BLD: NORMAL
EOSINOPHIL # BLD AUTO: 0.3 K/UL (ref 0–0.5)
EOSINOPHIL NFR BLD: 3.5 % (ref 0–8)
ERYTHROCYTE [DISTWIDTH] IN BLOOD BY AUTOMATED COUNT: 13 % (ref 11.5–14.5)
EST. GFR  (NO RACE VARIABLE): >60 ML/MIN/1.73 M^2
ESTIMATED AVG GLUCOSE: 140 MG/DL (ref 68–131)
GLUCOSE SERPL-MCNC: 115 MG/DL (ref 70–110)
HBA1C MFR BLD: 6.5 % (ref 4–5.6)
HCT VFR BLD AUTO: 50.5 % (ref 40–54)
HDLC SERPL-MCNC: 46 MG/DL (ref 40–75)
HDLC SERPL: 33.6 % (ref 20–50)
HGB BLD-MCNC: 17 G/DL (ref 14–18)
IMM GRANULOCYTES # BLD AUTO: 0.02 K/UL (ref 0–0.04)
IMM GRANULOCYTES NFR BLD AUTO: 0.3 % (ref 0–0.5)
LDLC SERPL CALC-MCNC: 65 MG/DL (ref 63–159)
LYMPHOCYTES # BLD AUTO: 2.1 K/UL (ref 1–4.8)
LYMPHOCYTES NFR BLD: 29.3 % (ref 18–48)
MCH RBC QN AUTO: 30.8 PG (ref 27–31)
MCHC RBC AUTO-ENTMCNC: 33.7 G/DL (ref 32–36)
MCV RBC AUTO: 92 FL (ref 82–98)
MICROALBUMIN UR DL<=1MG/L-MCNC: 50 UG/ML
MONOCYTES # BLD AUTO: 0.7 K/UL (ref 0.3–1)
MONOCYTES NFR BLD: 10.1 % (ref 4–15)
NEUTROPHILS # BLD AUTO: 4 K/UL (ref 1.8–7.7)
NEUTROPHILS NFR BLD: 55.4 % (ref 38–73)
NONHDLC SERPL-MCNC: 91 MG/DL
NRBC BLD-RTO: 0 /100 WBC
PLATELET # BLD AUTO: 258 K/UL (ref 150–450)
PMV BLD AUTO: 11.4 FL (ref 9.2–12.9)
POTASSIUM SERPL-SCNC: 3.9 MMOL/L (ref 3.5–5.1)
PROT SERPL-MCNC: 7.5 G/DL (ref 6–8.4)
RBC # BLD AUTO: 5.52 M/UL (ref 4.6–6.2)
SODIUM SERPL-SCNC: 142 MMOL/L (ref 136–145)
T4 FREE SERPL-MCNC: 0.92 NG/DL (ref 0.71–1.51)
TRIGL SERPL-MCNC: 130 MG/DL (ref 30–150)
TSH SERPL DL<=0.005 MIU/L-ACNC: 1.15 UIU/ML (ref 0.4–4)
WBC # BLD AUTO: 7.24 K/UL (ref 3.9–12.7)

## 2024-02-20 PROCEDURE — 99999 PR PBB SHADOW E&M-EST. PATIENT-LVL V: CPT | Mod: PBBFAC,,, | Performed by: FAMILY MEDICINE

## 2024-02-20 PROCEDURE — 83036 HEMOGLOBIN GLYCOSYLATED A1C: CPT | Performed by: FAMILY MEDICINE

## 2024-02-20 PROCEDURE — 84439 ASSAY OF FREE THYROXINE: CPT | Performed by: FAMILY MEDICINE

## 2024-02-20 PROCEDURE — 90715 TDAP VACCINE 7 YRS/> IM: CPT | Mod: S$GLB,,, | Performed by: FAMILY MEDICINE

## 2024-02-20 PROCEDURE — 84153 ASSAY OF PSA TOTAL: CPT | Performed by: FAMILY MEDICINE

## 2024-02-20 PROCEDURE — 36415 COLL VENOUS BLD VENIPUNCTURE: CPT | Mod: PN | Performed by: FAMILY MEDICINE

## 2024-02-20 PROCEDURE — 85025 COMPLETE CBC W/AUTO DIFF WBC: CPT | Performed by: FAMILY MEDICINE

## 2024-02-20 PROCEDURE — 82043 UR ALBUMIN QUANTITATIVE: CPT | Performed by: FAMILY MEDICINE

## 2024-02-20 PROCEDURE — 80053 COMPREHEN METABOLIC PANEL: CPT | Performed by: FAMILY MEDICINE

## 2024-02-20 PROCEDURE — 90471 IMMUNIZATION ADMIN: CPT | Mod: S$GLB,,, | Performed by: FAMILY MEDICINE

## 2024-02-20 PROCEDURE — 99396 PREV VISIT EST AGE 40-64: CPT | Mod: 25,S$GLB,, | Performed by: FAMILY MEDICINE

## 2024-02-20 PROCEDURE — 80061 LIPID PANEL: CPT | Performed by: FAMILY MEDICINE

## 2024-02-20 PROCEDURE — 90750 HZV VACC RECOMBINANT IM: CPT | Mod: S$GLB,,, | Performed by: FAMILY MEDICINE

## 2024-02-20 PROCEDURE — 84443 ASSAY THYROID STIM HORMONE: CPT | Performed by: FAMILY MEDICINE

## 2024-02-20 PROCEDURE — 90472 IMMUNIZATION ADMIN EACH ADD: CPT | Mod: S$GLB,,, | Performed by: FAMILY MEDICINE

## 2024-02-20 RX ORDER — AMLODIPINE BESYLATE 5 MG/1
5 TABLET ORAL DAILY
Qty: 90 TABLET | Refills: 3 | Status: SHIPPED | OUTPATIENT
Start: 2024-02-20

## 2024-02-20 NOTE — PROGRESS NOTES
Subjective:      Patient ID: Rafa Padilla is a 63 y.o. male.    Chief Complaint: Annual Exam      Patient is a 63 y.o. male coming in today for annual exam.   Initial BP is elevated in clinic. 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 He is due for updated lab work. Reports being consistent with medication. He is due for shingles, tetanus, and RSV vaccines. Pt is also due for colonoscopy; previous one in 2023 showed multiple polyps. Reports recent onset of urinary frequency. States stream is weak when he goes to the bathroom. Currently not f/u with urology. Reports he continues to smoke regularly; he is due for lung CT scan. Medication list updated during visit today. Reports doing yoga regularly for exercise. Repeat BP is still elevated. Currently not on BP medication; states having BP cuff at home. No other health concern at this time.       1. Routine general medical examination at a health care facility    2. Hypertension, unspecified type    3. Benign prostatic hyperplasia without lower urinary tract symptoms    4. Hyperlipidemia, unspecified hyperlipidemia type    5. Prediabetes    6. Spinal stenosis of lumbar region, unspecified whether neurogenic claudication present    7. Need for Tdap vaccination    8. Need for shingles vaccine    9. Personal history of tobacco use, presenting hazards to health    10. Colon cancer screening    11. Urinary frequency       No questionnaires on file.    Pmh, Psh, Family Hx, Social Hx, HM updated in Epic Tabs today.   Review of Systems   Constitutional:  Negative for activity change, appetite change, chills, fatigue and unexpected weight change.   HENT:  Negative for congestion, ear pain, postnasal drip, sneezing, sore throat and trouble swallowing.    Eyes:  Negative for pain and visual disturbance.   Respiratory:  Negative for cough and shortness of breath.    Cardiovascular:  Negative for chest pain and leg swelling.   Gastrointestinal:  Negative for abdominal pain,  constipation, diarrhea, nausea and vomiting.   Endocrine: Negative for cold intolerance and heat intolerance.   Genitourinary:  Positive for frequency. Negative for difficulty urinating, dysuria and flank pain.   Musculoskeletal:  Negative for arthralgias, back pain, joint swelling and neck pain.   Skin:  Negative for color change and rash.   Neurological:  Negative for dizziness, seizures and headaches.   Psychiatric/Behavioral:  Negative for behavioral problems, dysphoric mood and sleep disturbance. The patient is not nervous/anxious.      Objective:     Vitals:    02/20/24 1331   BP: (!) 162/86   BP Location: Left arm   Patient Position: Sitting   BP Method: Large (Manual)   Pulse: 88   Resp: 19   Temp: 97.6 °F (36.4 °C)   TempSrc: Tympanic   SpO2: 96%   Weight: 103.8 kg (228 lb 13.4 oz)   Height: 6' (1.829 m)     Wt Readings from Last 10 Encounters:   02/20/24 103.8 kg (228 lb 13.4 oz)   08/31/23 99.6 kg (219 lb 9.3 oz)   07/03/23 98.8 kg (217 lb 13 oz)   04/12/23 98.7 kg (217 lb 9.5 oz)   02/14/23 99.7 kg (219 lb 12.8 oz)   01/04/23 98.1 kg (216 lb 2.6 oz)   09/15/22 95.4 kg (210 lb 3.3 oz)   04/19/21 99.3 kg (218 lb 14.7 oz)   04/16/21 102.1 kg (225 lb)   01/03/20 98.2 kg (216 lb 7.9 oz)     Physical Exam  Vitals reviewed.   Constitutional:       General: He is not in acute distress.     Appearance: Normal appearance. He is well-developed. He is obese.   HENT:      Head: Normocephalic and atraumatic.      Right Ear: Tympanic membrane and external ear normal.      Left Ear: Tympanic membrane and external ear normal.      Nose: Nose normal.      Mouth/Throat:      Mouth: Mucous membranes are moist.      Pharynx: Oropharynx is clear.   Eyes:      Conjunctiva/sclera: Conjunctivae normal.      Pupils: Pupils are equal, round, and reactive to light.   Neck:      Thyroid: No thyromegaly.   Cardiovascular:      Rate and Rhythm: Normal rate and regular rhythm.      Heart sounds: No murmur heard.     No friction rub. No  gallop.      Comments: BP elevated for the past 1.5 years  Pulmonary:      Effort: Pulmonary effort is normal. No respiratory distress.      Breath sounds: Normal breath sounds.   Abdominal:      General: Bowel sounds are normal. There is no distension.      Palpations: Abdomen is soft.      Tenderness: There is no abdominal tenderness. There is no rebound.   Musculoskeletal:         General: Normal range of motion.      Cervical back: Normal range of motion and neck supple.   Lymphadenopathy:      Cervical: No cervical adenopathy.   Skin:     General: Skin is warm and dry.   Neurological:      General: No focal deficit present.      Mental Status: He is alert and oriented to person, place, and time.      Coordination: Coordination normal.   Psychiatric:         Attention and Perception: Attention normal.         Mood and Affect: Mood and affect normal.         Speech: Speech normal.         Behavior: Behavior normal.         Thought Content: Thought content normal.         Cognition and Memory: Cognition normal.         Judgment: Judgment normal.         Assessment:     1. Routine general medical examination at a health care facility    2. Hypertension, unspecified type    3. Benign prostatic hyperplasia without lower urinary tract symptoms    4. Hyperlipidemia, unspecified hyperlipidemia type    5. Prediabetes    6. Spinal stenosis of lumbar region, unspecified whether neurogenic claudication present    7. Need for Tdap vaccination    8. Need for shingles vaccine    9. Personal history of tobacco use, presenting hazards to health    10. Colon cancer screening    11. Urinary frequency        Plan:   Rafa was seen today for annual exam.    Diagnoses and all orders for this visit:    Routine general medical examination at a health care facility  -     TSH; Future  -     Hemoglobin A1C; Future  -     Lipid Panel; Future  -     Comprehensive Metabolic Panel; Future  -     CBC Auto Differential; Future  -     PSA,  Screening; Future  -     T4, Free; Future  -     Microalbumin/Creatinine Ratio, Urine; Future    Hypertension, unspecified type  Comments:  not controlled for >1 yr. not on meds. start today amlodipine 5mg.  Orders:  -     amLODIPine (NORVASC) 5 MG tablet; Take 1 tablet (5 mg total) by mouth once daily. Hypertension    Benign prostatic hyperplasia without lower urinary tract symptoms  -     TSH; Future  -     Hemoglobin A1C; Future  -     Lipid Panel; Future  -     Comprehensive Metabolic Panel; Future  -     CBC Auto Differential; Future  -     PSA, Screening; Future  -     T4, Free; Future  -     Microalbumin/Creatinine Ratio, Urine; Future  -     Ambulatory referral/consult to Urology; Future    Hyperlipidemia, unspecified hyperlipidemia type  -     TSH; Future  -     Hemoglobin A1C; Future  -     Lipid Panel; Future  -     Comprehensive Metabolic Panel; Future  -     CBC Auto Differential; Future  -     PSA, Screening; Future  -     T4, Free; Future  -     Microalbumin/Creatinine Ratio, Urine; Future    Prediabetes  -     TSH; Future  -     Hemoglobin A1C; Future  -     Lipid Panel; Future  -     Comprehensive Metabolic Panel; Future  -     CBC Auto Differential; Future  -     PSA, Screening; Future  -     T4, Free; Future  -     Microalbumin/Creatinine Ratio, Urine; Future    Spinal stenosis of lumbar region, unspecified whether neurogenic claudication present  -     TSH; Future  -     Hemoglobin A1C; Future  -     Lipid Panel; Future  -     Comprehensive Metabolic Panel; Future  -     CBC Auto Differential; Future  -     PSA, Screening; Future  -     T4, Free; Future  -     Microalbumin/Creatinine Ratio, Urine; Future    Need for Tdap vaccination  -     (In Office Administered) Tdap Vaccine    Need for shingles vaccine  -     Zoster Recombinant Vaccine; Future  -     Zoster Recombinant Vaccine; Future  -     Zoster Recombinant Vaccine    Personal history of tobacco use, presenting hazards to health  -     CT  Chest Lung Screening Low Dose; Future    Colon cancer screening  -     Ambulatory referral/consult to Endo Procedure ; Future    Urinary frequency  -     Ambulatory referral/consult to Urology; Future      HTN is not controlled at this time.   Instructed to monitor BP closely at home with cuff.   New Rx Amlodipine 5 mg/day for HTN treatment.   Urinary frequency is new problem and is not controlled at this time.   Referral given to urology for further frequency assessment.   Lab work ordered to be completed today.   Referral given to endoscopy to schedule colonoscopy due to Hx of multiple polyps.   Pt is still a smoker; lung CT ordered to be completed this week.   Shingrix and TDAP vaccines administered in clinic today; advised to schedule nurse visit in 2 months for second shingles vaccine.  Instructed to f/u in 3 months with MO[facility] for BP and lab work results f/u.     There are no Patient Instructions on file for this visit.    Follow up in about 2 weeks (around 3/5/2024) for f/u [facility] Hernando Kraft NP/ BP, labs .      LABS:   Lab Results   Component Value Date    HGBA1C 6.1 (H) 09/15/2022    HGBA1C 6.0 (H) 06/28/2019    HGBA1C 5.9 (H) 01/22/2019      Lab Results   Component Value Date    CHOL 162 09/15/2022    CHOL 180 04/22/2021    CHOL 126 06/28/2019     Lab Results   Component Value Date    LDLCALC 96.4 09/15/2022    LDLCALC 116.6 04/22/2021    LDLCALC 61.0 (L) 06/28/2019     Lab Results   Component Value Date    WBC 7.25 09/15/2022    HGB 16.3 09/15/2022    HCT 49.3 09/15/2022     09/15/2022    CHOL 162 09/15/2022    TRIG 88 09/15/2022    HDL 48 09/15/2022    ALT 25 09/15/2022    AST 22 09/15/2022     09/15/2022    K 4.4 09/15/2022     09/15/2022    CREATININE 0.9 09/15/2022    BUN 16 09/15/2022    CO2 27 09/15/2022    TSH 1.376 09/15/2022    PSA 4.0 09/15/2022    GLUF 109 12/18/2009    HGBA1C 6.1 (H) 09/15/2022       The 10-year ASCVD risk score (Sharath DEY, et al., 2019) is: 35.3%     Values used to calculate the score:      Age: 63 years      Sex: Male      Is Non- : Yes      Diabetic: No      Tobacco smoker: Yes      Systolic Blood Pressure: 162 mmHg      Is BP treated: Yes      HDL Cholesterol: 48 mg/dL      Total Cholesterol: 162 mg/dL  X-Ray Hip 2 or 3 views Right (with Pelvis when performed)  Narrative: EXAMINATION:  XR HIP WITH PELVIS WHEN PERFORMED, 2 OR 3  VIEWS RIGHT    CLINICAL HISTORY:  Pain in right hip    TECHNIQUE:  AP view of the pelvis and frog leg lateral view of the right hip were performed.    COMPARISON:  10/19/2022    FINDINGS:  The bony pelvis is intact.  The right femoral head is well seated within the acetabulum.  No acute fracture or dislocation.  The hip joint space is well preserved.  Metallic density seen projecting over the right hemipelvis possibly representing a surgical staple.  This was not seen on prior films from 10/19/2022.  Impression: 1.  As above    Electronically signed by: Gabino Ramirez DO  Date:    02/14/2023  Time:    12:01    Scribe Attestation:   I, Toi Ortiz, am scribing for, and in the presence of, Dr. Jaky Quinn MD. I performed the above scribed service and the documentation accurately describes the services I performed. I attest to the accuracy of the note.    I, Dr. Jaky Quinn MD, reviewed documentation as scribed above. I personally performed the services described in this documentation.  I agree that the record reflects my personal performance and is accurate and complete. Jaky Quinn MD.    02/20/2024       Well developed/Other Well developed

## 2024-02-22 NOTE — PROGRESS NOTES
"Please follow up with urology due to elevated PSA and  also with Hernando to review your labs since you have "diabetes" now based on your A1c levels to review results and discuss the next steps. Jaky Quinn MD  "

## 2024-02-26 ENCOUNTER — HOSPITAL ENCOUNTER (OUTPATIENT)
Dept: RADIOLOGY | Facility: HOSPITAL | Age: 64
Discharge: HOME | End: 2024-02-26
Attending: FAMILY MEDICINE
Payer: COMMERCIAL

## 2024-02-26 DIAGNOSIS — Z87.891 PERSONAL HISTORY OF TOBACCO USE, PRESENTING HAZARDS TO HEALTH: ICD-10-CM

## 2024-02-26 PROCEDURE — 71271 CT THORAX LUNG CANCER SCR C-: CPT | Mod: TC

## 2024-02-26 PROCEDURE — 71271 CT THORAX LUNG CANCER SCR C-: CPT | Mod: 26,,, | Performed by: RADIOLOGY

## 2024-03-05 ENCOUNTER — OFFICE VISIT (OUTPATIENT)
Dept: PRIMARY CARE CLINIC | Facility: CLINIC | Age: 64
End: 2024-03-05
Payer: COMMERCIAL

## 2024-03-05 ENCOUNTER — LAB VISIT (OUTPATIENT)
Dept: LAB | Facility: HOSPITAL | Age: 64
End: 2024-03-05
Attending: NURSE PRACTITIONER
Payer: COMMERCIAL

## 2024-03-05 VITALS
WEIGHT: 227.06 LBS | TEMPERATURE: 98 F | DIASTOLIC BLOOD PRESSURE: 80 MMHG | OXYGEN SATURATION: 97 % | BODY MASS INDEX: 30.75 KG/M2 | HEART RATE: 77 BPM | SYSTOLIC BLOOD PRESSURE: 148 MMHG | HEIGHT: 72 IN

## 2024-03-05 DIAGNOSIS — R97.20 ELEVATED PSA: ICD-10-CM

## 2024-03-05 DIAGNOSIS — E11.9 TYPE 2 DIABETES MELLITUS WITHOUT COMPLICATION, WITHOUT LONG-TERM CURRENT USE OF INSULIN: ICD-10-CM

## 2024-03-05 DIAGNOSIS — Z72.0 NICOTINE ABUSE: ICD-10-CM

## 2024-03-05 DIAGNOSIS — I10 HYPERTENSION, UNSPECIFIED TYPE: ICD-10-CM

## 2024-03-05 DIAGNOSIS — E11.9 TYPE 2 DIABETES MELLITUS WITHOUT COMPLICATION, WITHOUT LONG-TERM CURRENT USE OF INSULIN: Primary | ICD-10-CM

## 2024-03-05 DIAGNOSIS — E78.5 HYPERLIPIDEMIA, UNSPECIFIED HYPERLIPIDEMIA TYPE: ICD-10-CM

## 2024-03-05 DIAGNOSIS — E66.09 CLASS 1 OBESITY DUE TO EXCESS CALORIES WITH BODY MASS INDEX (BMI) OF 30.0 TO 30.9 IN ADULT, UNSPECIFIED WHETHER SERIOUS COMORBIDITY PRESENT: ICD-10-CM

## 2024-03-05 LAB
BACTERIA #/AREA URNS AUTO: NORMAL /HPF
BILIRUB UR QL STRIP: NEGATIVE
CLARITY UR REFRACT.AUTO: ABNORMAL
COLOR UR AUTO: YELLOW
GLUCOSE UR QL STRIP: ABNORMAL
HGB UR QL STRIP: NEGATIVE
KETONES UR QL STRIP: NEGATIVE
LEUKOCYTE ESTERASE UR QL STRIP: NEGATIVE
MICROSCOPIC COMMENT: NORMAL
NITRITE UR QL STRIP: NEGATIVE
PH UR STRIP: 5 [PH] (ref 5–8)
PROT UR QL STRIP: NEGATIVE
SP GR UR STRIP: 1.02 (ref 1–1.03)
SQUAMOUS #/AREA URNS AUTO: 1 /HPF
URN SPEC COLLECT METH UR: ABNORMAL

## 2024-03-05 PROCEDURE — 99999 PR PBB SHADOW E&M-EST. PATIENT-LVL V: CPT | Mod: PBBFAC,,, | Performed by: NURSE PRACTITIONER

## 2024-03-05 PROCEDURE — 99214 OFFICE O/P EST MOD 30 MIN: CPT | Mod: S$GLB,,, | Performed by: NURSE PRACTITIONER

## 2024-03-05 PROCEDURE — 81001 URINALYSIS AUTO W/SCOPE: CPT | Performed by: NURSE PRACTITIONER

## 2024-03-05 RX ORDER — METFORMIN HYDROCHLORIDE 500 MG/1
500 TABLET, EXTENDED RELEASE ORAL
Qty: 90 TABLET | Refills: 3 | Status: SHIPPED | OUTPATIENT
Start: 2024-03-05 | End: 2024-06-14

## 2024-03-05 NOTE — PROGRESS NOTES
HPI     Chief Complaint  Chief Complaint   Patient presents with    Follow-up     For bp and labs       HPI  Rafa Padilla is a 63 y.o. male with multiple medical diagnoses as listed in the medical history and problem list that presents for HTN Follow-up.  This patient is new to me.     HTN Follow-up: Blood pressure elevated at 162/86 approx two weeks. Started on Amlodipine 5 mg daily at the time of two weeks ago. Today blood pressure is 140/80 at first reading and 148/80 on second reading. Pertinent negatives are chest pain/palpitations/tightness/discomfort, SOB, GI upset, bowel changes, unexplained weight loss/gain, dizziness/headaches/syncope.      2. DM Type 2: Elevated A1C at 6.5. Fasting Glucose at 115  Previously prediabetic. Endorses polyuria and polyphagia. Denies polydipsia. Reports drinking a lot of saeed, late night snacking, pastas, red meats rice and carbs. Eleven lbs weight gain over the last year,.  No regular aerobic exercising at this time.     3. Elevated PSA: PSA reading is 5.1. Visits with Urology tomorrow, 03/06/2024.     Wt Readings from Last 10 Encounters:   03/06/24 103 kg (227 lb)   03/05/24 103 kg (227 lb 1.2 oz)   02/20/24 103.8 kg (228 lb 13.4 oz)   08/31/23 99.6 kg (219 lb 9.3 oz)   07/03/23 98.8 kg (217 lb 13 oz)   04/12/23 98.7 kg (217 lb 9.5 oz)   02/14/23 99.7 kg (219 lb 12.8 oz)   01/04/23 98.1 kg (216 lb 2.6 oz)   09/15/22 95.4 kg (210 lb 3.3 oz)   04/19/21 99.3 kg (218 lb 14.7 oz)         History     PAST MEDICAL HISTORY:  Past Medical History:   Diagnosis Date    Anxiety     BPH (benign prostatic hyperplasia)     Degenerative disc disease     Hyperlipidemia     Hypertension 2/20/2024    Prediabetes     Spinal stenosis, lumbar     Tobacco use        PAST SURGICAL HISTORY:  History reviewed. No pertinent surgical history.    SOCIAL HISTORY:  Social History     Socioeconomic History    Marital status:      Spouse name: Aurora    Number of children: 2   Occupational  History     Employer: [employer]   Tobacco Use    Smoking status: Every Day     Current packs/day: 0.50     Average packs/day: 0.5 packs/day for 40.0 years (20.0 ttl pk-yrs)     Types: Cigarettes    Smokeless tobacco: Never   Substance and Sexual Activity    Alcohol use: Yes     Comment: occasionally    Drug use: No    Sexual activity: Yes     Partners: Female     Birth control/protection: None       FAMILY HISTORY:  Family History   Problem Relation Age of Onset    Arthritis Mother     COPD Mother     Diabetes Mother     Heart disease Mother     Hypertension Mother     No Known Problems Father        ALLERGIES AND MEDICATIONS: updated and reviewed.  Review of patient's allergies indicates:  No Known Allergies  Current Outpatient Medications   Medication Sig Dispense Refill    amLODIPine (NORVASC) 5 MG tablet Take 1 tablet (5 mg total) by mouth once daily. Hypertension 90 tablet 3    aspirin 81 mg Tab Take by mouth. 1 Tablet Oral Every day      atorvastatin (LIPITOR) 20 MG tablet TAKE 1 TABLET(20 MG) BY MOUTH EVERY DAY 90 tablet 0    empagliflozin (JARDIANCE) 10 mg tablet Take 1 tablet (10 mg total) by mouth once daily. 90 tablet 1    metFORMIN (GLUCOPHAGE-XR) 500 MG ER 24hr tablet Take 1 tablet (500 mg total) by mouth daily with breakfast. 90 tablet 3    sildenafil (VIAGRA) 100 MG tablet 1/2 or 1 tab po q 24 hours prn sexual activity 15 tablet 1    tamsulosin (FLOMAX) 0.4 mg Cap TAKE 2 CAPSULES(0.8 MG) BY MOUTH EVERY  capsule 0     No current facility-administered medications for this visit.       Exam     ROS  Review of Systems   Constitutional:  Negative for appetite change, chills, fatigue and fever.   HENT:  Negative for congestion, ear pain, postnasal drip, rhinorrhea, sneezing, sore throat and tinnitus.    Respiratory:  Negative for cough and shortness of breath.    Cardiovascular:  Negative for chest pain and palpitations.   Gastrointestinal:  Negative for abdominal pain, constipation,  diarrhea, nausea and vomiting.   Genitourinary:  Negative for difficulty urinating and dysuria.   Musculoskeletal:  Negative for arthralgias.   Neurological:  Negative for headaches.           Physical Exam  Vitals:    03/05/24 1103 03/05/24 1120   BP: (!) 140/80 (!) 148/80   Pulse: 77    Temp: 97.5 °F (36.4 °C)    SpO2: 97%    Weight: 103 kg (227 lb 1.2 oz)    Height: 6' (1.829 m)     Body mass index is 30.8 kg/m².  Weight: 103 kg (227 lb 1.2 oz)   Height: 6' (182.9 cm)   Physical Exam  Constitutional:       General: He is not in acute distress.     Appearance: Normal appearance. He is well-developed.   HENT:      Head: Normocephalic and atraumatic.      Right Ear: External ear normal.      Left Ear: External ear normal.      Nose: Nose normal.      Mouth/Throat:      Pharynx: No oropharyngeal exudate.   Eyes:      Pupils: Pupils are equal, round, and reactive to light.   Cardiovascular:      Rate and Rhythm: Normal rate and regular rhythm.      Heart sounds: No murmur heard.     No friction rub. No gallop.   Pulmonary:      Effort: Pulmonary effort is normal. No respiratory distress.      Breath sounds: Normal breath sounds. No wheezing.   Abdominal:      General: Bowel sounds are normal.      Palpations: Abdomen is soft.   Musculoskeletal:      Cervical back: Neck supple.   Lymphadenopathy:      Cervical: No cervical adenopathy.   Skin:     General: Skin is warm and dry.   Neurological:      Mental Status: He is alert and oriented to person, place, and time.   Psychiatric:         Mood and Affect: Mood normal.           Health Maintenance         Date Due Completion Date    RSV Vaccine (Age 60+ and Pregnant patients) (1 - 1-dose 60+ series) Never done ---    Colorectal Cancer Screening 01/04/2024 1/4/2023    Shingles Vaccine (2 of 2) 04/16/2024 2/20/2024    PROSTATE-SPECIFIC ANTIGEN 02/20/2025 2/20/2024    Hemoglobin A1c (Prediabetes) 02/20/2025 2/20/2024    Lipid Panel 02/20/2025 2/20/2024    LDCT Lung Screen  02/26/2025 2/26/2024    TETANUS VACCINE 02/20/2034 2/20/2024            Assessment & Plan     Assessment & Plan  Problem List Items Addressed This Visit          Cardiac/Vascular    Hyperlipidemia    Hypertension     Other Visit Diagnoses       Type 2 diabetes mellitus without complication, without long-term current use of insulin    -  Primary    Relevant Medications    metFORMIN (GLUCOPHAGE-XR) 500 MG ER 24hr tablet    empagliflozin (JARDIANCE) 10 mg tablet    Other Relevant Orders    Urinalysis, Reflex to Urine Culture Urine, Clean Catch (Completed)    Comprehensive Metabolic Panel    Lipid Panel    Hemoglobin A1C    Elevated PSA        Relevant Orders    Urinalysis, Reflex to Urine Culture Urine, Clean Catch (Completed)    Class 1 obesity due to excess calories with body mass index (BMI) of 30.0 to 30.9 in adult, unspecified whether serious comorbidity present        Nicotine abuse        Relevant Orders    Ambulatory referral/consult to Smoking Cessation Program              Health Maintenance reviewed: Deferred att this time    Follow-up: Schedule DM follow-up with MARITZA Villagomez in 3 months    30+ minutes of total time spent on the encounter, which includes face to face time and non-face to face time preparing to see the patient (eg, review of tests), Obtaining and/or reviewing separately obtained history, documenting clinical information in the electronic or other health record, independently interpreting results (not separately reported) and communicating results to the patient/family/caregiver, or Care coordination (not separately reported).

## 2024-03-05 NOTE — PATIENT INSTRUCTIONS
Try Metformin 500 mg ER first   If Metformin proves to induce loose, runny stools or diarrhea then start  Jardiance 10 mg daily

## 2024-03-06 ENCOUNTER — OFFICE VISIT (OUTPATIENT)
Dept: UROLOGY | Facility: CLINIC | Age: 64
End: 2024-03-06
Payer: COMMERCIAL

## 2024-03-06 VITALS
BODY MASS INDEX: 30.75 KG/M2 | HEART RATE: 80 BPM | DIASTOLIC BLOOD PRESSURE: 76 MMHG | SYSTOLIC BLOOD PRESSURE: 119 MMHG | WEIGHT: 227 LBS | HEIGHT: 72 IN

## 2024-03-06 DIAGNOSIS — R97.20 ELEVATED PSA: ICD-10-CM

## 2024-03-06 DIAGNOSIS — N40.0 BENIGN PROSTATIC HYPERPLASIA WITHOUT LOWER URINARY TRACT SYMPTOMS: ICD-10-CM

## 2024-03-06 DIAGNOSIS — R35.0 URINARY FREQUENCY: Primary | ICD-10-CM

## 2024-03-06 LAB
BILIRUB UR QL STRIP: NEGATIVE
GLUCOSE UR QL STRIP: NEGATIVE
KETONES UR QL STRIP: NEGATIVE
LEUKOCYTE ESTERASE UR QL STRIP: NEGATIVE
PH, POC UA: 5.5
POC BLOOD, URINE: NEGATIVE
POC NITRATES, URINE: NEGATIVE
PROT UR QL STRIP: NEGATIVE
SP GR UR STRIP: 1.03 (ref 1–1.03)
UROBILINOGEN UR STRIP-ACNC: 0.2 (ref 0.3–2.2)

## 2024-03-06 PROCEDURE — 99244 OFF/OP CNSLTJ NEW/EST MOD 40: CPT | Mod: S$GLB,,, | Performed by: UROLOGY

## 2024-03-06 PROCEDURE — 81003 URINALYSIS AUTO W/O SCOPE: CPT | Mod: QW,S$GLB,, | Performed by: UROLOGY

## 2024-03-06 PROCEDURE — 99999 PR PBB SHADOW E&M-EST. PATIENT-LVL IV: CPT | Mod: PBBFAC,,, | Performed by: UROLOGY

## 2024-03-28 DIAGNOSIS — N40.0 BENIGN PROSTATIC HYPERPLASIA WITHOUT LOWER URINARY TRACT SYMPTOMS: ICD-10-CM

## 2024-03-28 RX ORDER — TAMSULOSIN HYDROCHLORIDE 0.4 MG/1
0.8 CAPSULE ORAL
Qty: 180 CAPSULE | Refills: 3 | Status: SHIPPED | OUTPATIENT
Start: 2024-03-28

## 2024-03-28 NOTE — TELEPHONE ENCOUNTER
Refill Decision Note   Rafa Valerie  is requesting a refill authorization.  Brief Assessment and Rationale for Refill:  Approve     Medication Therapy Plan:         Comments:     Note composed:2:47 PM 03/28/2024

## 2024-03-28 NOTE — TELEPHONE ENCOUNTER
No care due was identified.  [facility] Embedded Care Due Messages. Reference number: [id number].   3/28/2024 3:18:49 AM CDT

## 2024-03-29 NOTE — PROGRESS NOTES
Chief Complaint:  Frequency, nocturia, weak stream, elevated PSA    HPI:   Rafa Padilla is a 63 y.o. male that presents today as a referral from Dr. Quinn for the above issues.  Patient notes a recent onset of difficulty with weak stream, frequency Q 1 hour, and nocturia up to 5 times per night.  Patient has been on Flomax for a significant period of time and notes that the Flomax does help with his stream. Patient is also on Jardiance, was recently started on this for diabetes.  Patient notes that he mostly drinks water during the day, denies any coffee or tea or sodas.  He also had routine labs obtained which showed a PSA 5.1, no prior elevated values in our system.  PSA in September 2022 4.0.  He denies gross hematuria or dysuria.  Denies family history of  cancers.      PMH:  Past Medical History:   Diagnosis Date    Anxiety     BPH (benign prostatic hyperplasia)     Degenerative disc disease     Hyperlipidemia     Hypertension 2/20/2024    Prediabetes     Spinal stenosis, lumbar     Tobacco use        PSH:  History reviewed. No pertinent surgical history.    Family History:  Family History   Problem Relation Age of Onset    Arthritis Mother     COPD Mother     Diabetes Mother     Heart disease Mother     Hypertension Mother     No Known Problems Father        Social History:  Social History     Tobacco Use    Smoking status: Every Day     Current packs/day: 0.50     Average packs/day: 0.5 packs/day for 40.0 years (20.0 ttl pk-yrs)     Types: Cigarettes    Smokeless tobacco: Never   Substance Use Topics    Alcohol use: Yes     Comment: occasionally    Drug use: No        Review of Systems:  General: No fever, chills  Skin: No rashes  Chest:  Denies cough and sputum production  Heart: Denies chest pain  Resp: Denies dyspnea  Abdomen: Denies diarrhea, abdominal pain, hematemesis, or blood in stool.  Musculoskeletal: No joint stiffness or swelling. Denies back pain.  : see HPI  Neuro: no dizziness or  weakness    Allergies:  Patient has no known allergies.    Medications:    Current Outpatient Medications:     amLODIPine (NORVASC) 5 MG tablet, Take 1 tablet (5 mg total) by mouth once daily. Hypertension, Disp: 90 tablet, Rfl: 3    aspirin 81 mg Tab, Take by mouth. 1 Tablet Oral Every day, Disp: , Rfl:     atorvastatin (LIPITOR) 20 MG tablet, TAKE 1 TABLET(20 MG) BY MOUTH EVERY DAY, Disp: 90 tablet, Rfl: 0    empagliflozin (JARDIANCE) 10 mg tablet, Take 1 tablet (10 mg total) by mouth once daily., Disp: 90 tablet, Rfl: 1    metFORMIN (GLUCOPHAGE-XR) 500 MG ER 24hr tablet, Take 1 tablet (500 mg total) by mouth daily with breakfast., Disp: 90 tablet, Rfl: 3    sildenafil (VIAGRA) 100 MG tablet, 1/2 or 1 tab po q 24 hours prn sexual activity, Disp: 15 tablet, Rfl: 1    tamsulosin (FLOMAX) 0.4 mg Cap, TAKE 2 CAPSULES(0.8 MG) BY MOUTH EVERY DAY, Disp: 180 capsule, Rfl: 3    Physical Exam:  Vitals:    03/06/24 1109   BP: 119/76   Pulse: 80     Body mass index is 30.79 kg/m².  General: awake, alert, cooperative  Head: NC/AT  Ears: external ears normal  Eyes: sclera normal  Lungs: normal inspiration, NAD  Heart: well-perfused  Abdomen: Soft, NT, ND  : Normal uncirc'd phallus, meatus normal in size and position, BL testicles palpable, no masses, nontender, no abnormalities of epididymi  WINNIE: Normal rectal tone, no hemorrhoids. Prostate smooth and normal, no nodules 30 gm SV not palpable. Perineum and anus normal.  Lymphatic: groin nodes negative  Skin: The skin is warm and dry  Ext: No c/c/e.  Neuro: grossly intact, AOx3    RADIOLOGY:  No recent relevant imaging available for review.    LABS:  I personally reviewed the following lab values:  Lab Results   Component Value Date    WBC 7.24 02/20/2024    HGB 17.0 02/20/2024    HCT 50.5 02/20/2024     02/20/2024     02/20/2024    K 3.9 02/20/2024     02/20/2024    CREATININE 1.0 02/20/2024    BUN 15 02/20/2024    CO2 28 02/20/2024    TSH 1.151 02/20/2024     PSA 5.1 (H) 02/20/2024    GLUF 109 12/18/2009    HGBA1C 6.5 (H) 02/20/2024    CHOL 137 02/20/2024    TRIG 130 02/20/2024    HDL 46 02/20/2024    ALT 35 02/20/2024    AST 27 02/20/2024       Assessment/Plan:   Rafa Padilla is a 63 y.o. male with:    Elevated PSA - repeat PSA in six weeks, follow-up for discussion, if it continues to be elevated, we will proceed with further investigation    Nocturia - limit fluids 2-3 hours prior to bed    Urinary frequency - could be due to the Jardiance, recommended drinking water only and avoiding tea, coffee, spicy foods    BPH with weak stream - continue Flomax    Thank you for allowing me the opportunity to participate in this patient's care.     Arun Holland MD  Urology

## 2024-04-08 DIAGNOSIS — E78.5 HYPERLIPIDEMIA, UNSPECIFIED HYPERLIPIDEMIA TYPE: ICD-10-CM

## 2024-04-08 NOTE — TELEPHONE ENCOUNTER
No care due was identified.  [facility] Embedded Care Due Messages. Reference number: [id number].   4/08/2024 10:19:10 AM CDT

## 2024-04-09 RX ORDER — ATORVASTATIN CALCIUM 20 MG/1
20 TABLET, FILM COATED ORAL
Qty: 90 TABLET | Refills: 3 | Status: SHIPPED | OUTPATIENT
Start: 2024-04-09

## 2024-04-09 NOTE — TELEPHONE ENCOUNTER
Refill Decision Note   Rafa Padilla  is requesting a refill authorization.  Brief Assessment and Rationale for Refill:  Approve     Medication Therapy Plan:         Comments:     Note composed:3:11 AM 04/09/2024

## 2024-05-06 ENCOUNTER — HOSPITAL ENCOUNTER (OUTPATIENT)
Dept: PREADMISSION TESTING | Facility: HOSPITAL | Age: 64
Discharge: HOME | End: 2024-05-06
Attending: COLON & RECTAL SURGERY
Payer: COMMERCIAL

## 2024-05-06 DIAGNOSIS — Z12.11 COLON CANCER SCREENING: Primary | ICD-10-CM

## 2024-05-06 RX ORDER — SODIUM, POTASSIUM,MAG SULFATES 17.5-3.13G
1 SOLUTION, RECONSTITUTED, ORAL ORAL DAILY
Qty: 1 KIT | Refills: 0 | Status: SHIPPED | OUTPATIENT
Start: 2024-05-06 | End: 2024-05-08

## 2024-05-24 ENCOUNTER — ANESTHESIA EVENT (OUTPATIENT)
Dept: ENDOSCOPY | Facility: HOSPITAL | Age: 64
End: 2024-05-24
Payer: COMMERCIAL

## 2024-05-24 NOTE — ANESTHESIA PREPROCEDURE EVALUATION
05/24/2024  Rafa Padilla is a 63 y.o., male.  Past Medical History:   Diagnosis Date    Anxiety     BPH (benign prostatic hyperplasia)     Degenerative disc disease     Hyperlipidemia     Hypertension 2/20/2024    Prediabetes     Spinal stenosis, lumbar     Tobacco use      History reviewed. No pertinent surgical history.      Pre-op Assessment    I have reviewed the Patient Summary Reports.     I have reviewed the Nursing Notes. I have reviewed the NPO Status.   I have reviewed the Medications.     Review of Systems  Anesthesia Hx:  No problems with previous Anesthesia                Social:  Smoker, Social Alcohol Use       Cardiovascular:     Hypertension                                  Hypertension         Hepatic/GI:  Bowel Prep.                Musculoskeletal:  Arthritis        Arthritis          Neurological:      Arthritis                           Endocrine:     Pre-diabetic        Psych:   anxiety                 Physical Exam  General: Well nourished, Cooperative, Alert and Oriented    Airway:  Mallampati: II   Mouth Opening: Normal  Tongue: Normal  Neck ROM: Normal ROM        Anesthesia Plan  Type of Anesthesia, risks & benefits discussed:    Anesthesia Type: Gen Natural Airway  Intra-op Monitoring Plan: Standard ASA Monitors  Induction:  IV  Informed Consent: Informed consent signed with the Patient and all parties understand the risks and agree with anesthesia plan.  All questions answered. Patient consented to blood products? No  ASA Score: 2  Day of Surgery Review of History & Physical: H&P Update referred to the surgeon/provider.    Ready For Surgery From Anesthesia Perspective.     .

## 2024-05-27 ENCOUNTER — ANESTHESIA (OUTPATIENT)
Dept: ENDOSCOPY | Facility: HOSPITAL | Age: 64
End: 2024-05-27
Payer: COMMERCIAL

## 2024-05-27 ENCOUNTER — HOSPITAL ENCOUNTER (OUTPATIENT)
Facility: HOSPITAL | Age: 64
Discharge: HOME | End: 2024-05-27
Attending: INTERNAL MEDICINE | Admitting: INTERNAL MEDICINE
Payer: COMMERCIAL

## 2024-05-27 VITALS
DIASTOLIC BLOOD PRESSURE: 65 MMHG | RESPIRATION RATE: 18 BRPM | TEMPERATURE: 97 F | HEART RATE: 62 BPM | WEIGHT: 214.06 LBS | BODY MASS INDEX: 28.99 KG/M2 | HEIGHT: 72 IN | OXYGEN SATURATION: 99 % | SYSTOLIC BLOOD PRESSURE: 112 MMHG

## 2024-05-27 DIAGNOSIS — Z12.11 COLON CANCER SCREENING: ICD-10-CM

## 2024-05-27 LAB — POCT GLUCOSE: 131 MG/DL (ref 70–110)

## 2024-05-27 PROCEDURE — 37000008 HC ANESTHESIA 1ST 15 MINUTES: Performed by: INTERNAL MEDICINE

## 2024-05-27 PROCEDURE — 63600175 PHARM REV CODE 636 W HCPCS: Performed by: NURSE ANESTHETIST, CERTIFIED REGISTERED

## 2024-05-27 PROCEDURE — 45385 COLONOSCOPY W/LESION REMOVAL: CPT | Mod: 33,,, | Performed by: INTERNAL MEDICINE

## 2024-05-27 PROCEDURE — 88305 TISSUE EXAM BY PATHOLOGIST: CPT | Mod: 59 | Performed by: PATHOLOGY

## 2024-05-27 PROCEDURE — 37000009 HC ANESTHESIA EA ADD 15 MINS: Performed by: INTERNAL MEDICINE

## 2024-05-27 PROCEDURE — 82962 GLUCOSE BLOOD TEST: CPT | Performed by: INTERNAL MEDICINE

## 2024-05-27 PROCEDURE — 25000003 PHARM REV CODE 250: Performed by: NURSE ANESTHETIST, CERTIFIED REGISTERED

## 2024-05-27 PROCEDURE — 63600175 PHARM REV CODE 636 W HCPCS: Performed by: INTERNAL MEDICINE

## 2024-05-27 PROCEDURE — 27201089 HC SNARE, DISP (ANY): Performed by: INTERNAL MEDICINE

## 2024-05-27 PROCEDURE — 88305 TISSUE EXAM BY PATHOLOGIST: CPT | Mod: 26,,, | Performed by: PATHOLOGY

## 2024-05-27 PROCEDURE — 45385 COLONOSCOPY W/LESION REMOVAL: CPT | Mod: 33 | Performed by: INTERNAL MEDICINE

## 2024-05-27 RX ORDER — LIDOCAINE HYDROCHLORIDE 20 MG/ML
INJECTION, SOLUTION EPIDURAL; INFILTRATION; INTRACAUDAL; PERINEURAL
Status: DISCONTINUED | OUTPATIENT
Start: 2024-05-27 | End: 2024-05-27

## 2024-05-27 RX ORDER — SODIUM CHLORIDE, SODIUM LACTATE, POTASSIUM CHLORIDE, CALCIUM CHLORIDE 600; 310; 30; 20 MG/100ML; MG/100ML; MG/100ML; MG/100ML
INJECTION, SOLUTION INTRAVENOUS CONTINUOUS
Status: DISCONTINUED | OUTPATIENT
Start: 2024-05-27 | End: 2024-05-27 | Stop reason: HOSPADM

## 2024-05-27 RX ORDER — PROPOFOL 10 MG/ML
VIAL (ML) INTRAVENOUS
Status: DISCONTINUED | OUTPATIENT
Start: 2024-05-27 | End: 2024-05-27

## 2024-05-27 RX ADMIN — PROPOFOL 30 MG: 10 INJECTION, EMULSION INTRAVENOUS at 11:05

## 2024-05-27 RX ADMIN — SODIUM CHLORIDE, POTASSIUM CHLORIDE, SODIUM LACTATE AND CALCIUM CHLORIDE: 600; 310; 30; 20 INJECTION, SOLUTION INTRAVENOUS at 10:05

## 2024-05-27 RX ADMIN — LIDOCAINE HYDROCHLORIDE 40 MG: 20 INJECTION, SOLUTION EPIDURAL; INFILTRATION; INTRACAUDAL; PERINEURAL at 11:05

## 2024-05-27 RX ADMIN — PROPOFOL 100 MG: 10 INJECTION, EMULSION INTRAVENOUS at 11:05

## 2024-05-27 NOTE — ANESTHESIA POSTPROCEDURE EVALUATION
Anesthesia Post Evaluation    Patient: Rafa Padilla    Procedure(s) Performed: Procedure(s) (LRB):  COLONOSCOPY (N/A)    Final Anesthesia Type: general      Patient location during evaluation: PACU  Patient participation: Yes- Able to Participate  Level of consciousness: awake  Post-procedure vital signs: reviewed and stable  Pain management: adequate  Airway patency: patent    PONV status at discharge: No PONV  Anesthetic complications: no      Cardiovascular status: stable  Respiratory status: unassisted  Hydration status: euvolemic  Follow-up not needed.              Vitals Value Taken Time   /56 05/27/24 1157        Pulse 51 05/27/24 1157   Resp 18 05/27/24 1157   SpO2 97 % 05/27/24 1157         No case tracking events are documented in the log.      Pain/Brigitte Score: Brigitte Score: 10 (5/27/2024 11:57 AM)

## 2024-05-27 NOTE — PROVATION PATIENT INSTRUCTIONS
Discharge Summary/Instructions after an Endoscopic Procedure  Patient Name: Rafa Padilla  Patient MRN: [id number]  Patient YOB: 1960  Monday, May 27, 2024  Amrit Gomez MD  Dear patient,  As a result of recent federal legislation (The Federal Cures Act), you may   receive lab or pathology results from your procedure in your [facility]   account before your physician is able to contact you. Your physician or   their representative will relay the results to you with their   recommendations at their soonest availability.  Thank you,  RESTRICTIONS:  During your procedure today, you received medications for sedation.  These   medications may affect your judgment, balance and coordination.  Therefore,   for 24 hours, you have the following restrictions:   - DO NOT drive a car, operate machinery, make legal/financial decisions,   sign important papers or drink alcohol.    ACTIVITY:  Today: no heavy lifting, straining or running due to procedural   sedation/anesthesia.  The following day: return to full activity including work.  DIET:  Eat and drink normally unless instructed otherwise.     TREATMENT FOR COMMON SIDE EFFECTS:  - Mild abdominal pain, nausea, belching, bloating or excessive gas:  rest,   eat lightly and use a heating pad.  - Sore Throat: treat with throat lozenges and/or gargle with warm salt   water.  - Because air was used during the procedure, expelling large amounts of air   from your rectum or belching is normal.  - If a bowel prep was taken, you may not have a bowel movement for 1-3 days.    This is normal.  SYMPTOMS TO WATCH FOR AND REPORT TO YOUR PHYSICIAN:  1. Abdominal pain or bloating, other than gas cramps.  2. Chest pain.  3. Back pain.  4. Signs of infection such as: chills or fever occurring within 24 hours   after the procedure.  5. Rectal bleeding, which would show as bright red, maroon, or black stools.   (A tablespoon of blood from the rectum is not serious, especially  if   hemorrhoids are present.)  6. Vomiting.  7. Weakness or dizziness.  GO DIRECTLY TO THE NEAREST EMERGENCY ROOM IF YOU HAVE ANY OF THE FOLLOWING:      Difficulty breathing              Chills and/or fever over 101 F   Persistent vomiting and/or vomiting blood   Severe abdominal pain   Severe chest pain   Black, tarry stools   Bleeding- more than one tablespoon   Any other symptom or condition that you feel may need urgent attention  Your doctor recommends these additional instructions:  If any biopsies were taken, your doctors clinic will contact you in 1 to 2   weeks with any results.  - Discharge patient to home.   - Resume previous diet.   - Continue present medications.   - Await pathology results.   - Repeat colonoscopy in 5 years for surveillance.   - Return to referring physician as previously scheduled.   - Patient has a contact number available for emergencies.  The signs and   symptoms of potential delayed complications were discussed with the   patient.  Return to normal activities tomorrow.  Written discharge   instructions were provided to the patient.  For questions, problems or results please call your physician Amrit Gomez MD at Work:  [phone]  If you have any questions about the above instructions, call the GI   department at [phone] or call the endoscopy unit at [phone]   from 7am until 3 pm.  [facility] [place], EMERGENCY ROOM PHONE NUMBER:   [phone]  IF A COMPLICATION OR EMERGENCY SITUATION ARISES AND YOU ARE UNABLE TO REACH   YOUR PHYSICIAN - GO DIRECTLY TO THE EMERGENCY ROOM.  I have read or have had read to me these discharge instructions for my   procedure and have received a written copy.  I understand these   instructions and will follow-up with my physician if I have any questions.     __________________________________       _____________________________________  Nurse Signature                                           Patient/Designated   Responsible Party Signature  MD Amrit Burton MD  5/27/2024 11:55:04 AM  This report has been verified and signed electronically.  Dear patient,  As a result of recent federal legislation (The Federal Cures Act), you may   receive lab or pathology results from your procedure in your [facility]   account before your physician is able to contact you. Your physician or   their representative will relay the results to you with their   recommendations at their soonest availability.  Thank you,  PROVATION

## 2024-05-27 NOTE — H&P
Endoscopy History and Physical    PCP - Jaky Quinn MD  Referring Physician - Jaky Quinn MD  [id number] [facility]  [place] [id number]      ASA - per anesthesia  Mallampati - per anesthesia  History of Anesthesia problems - no  Family history Anesthesia problems -  no   Plan of anesthesia - General    HPI  63 y.o. male    Planned Procedure: Colonoscopy  Diagnosis: previous adenomatous polyp  Chief Complaint: Same as above    Personnel H/o colon polyps:yes   FH of colon cancer:no  Anticoagulation:no      ROS:  Constitutional: No fevers, chills, No weight loss  CV: No chest pain  Pulm: No cough, No shortness of breath  GI: see HPI    Medical History:  has a past medical history of Anxiety, BPH (benign prostatic hyperplasia), Degenerative disc disease, Hyperlipidemia, Hypertension (2/20/2024), Prediabetes, Spinal stenosis, lumbar, and Tobacco use.    Surgical History:  has no past surgical history on file.    Family History: family history includes Arthritis in his mother; COPD in his mother; Diabetes in his mother; Heart disease in his mother; Hypertension in his mother; No Known Problems in his father..    Social History:  reports that he has been smoking cigarettes. He has a 20 pack-year smoking history. He has never used smokeless tobacco. He reports current alcohol use. He reports that he does not use drugs.    Review of patient's allergies indicates:  No Known Allergies    Medications:   Medications Prior to Admission   Medication Sig Dispense Refill Last Dose    amLODIPine (NORVASC) 5 MG tablet Take 1 tablet (5 mg total) by mouth once daily. Hypertension 90 tablet 3 5/24/2024    aspirin 81 mg Tab Take by mouth. 1 Tablet Oral Every day   5/20/2024    atorvastatin (LIPITOR) 20 MG tablet TAKE 1 TABLET(20 MG) BY MOUTH EVERY DAY 90 tablet 3 5/24/2024    tamsulosin (FLOMAX) 0.4 mg Cap TAKE 2 CAPSULES(0.8 MG) BY MOUTH EVERY  capsule 3 5/24/2024    empagliflozin (JARDIANCE) 10 mg tablet Take 1  tablet (10 mg total) by mouth once daily. (Patient not taking: Reported on 5/22/2024) 90 tablet 1 Not Taking    metFORMIN (GLUCOPHAGE-XR) 500 MG ER 24hr tablet Take 1 tablet (500 mg total) by mouth daily with breakfast. (Patient not taking: Reported on 5/22/2024) 90 tablet 3 Not Taking    sildenafil (VIAGRA) 100 MG tablet 1/2 or 1 tab po q 24 hours prn sexual activity 15 tablet 1        Physical Exam:    Vital Signs: There were no vitals filed for this visit.    General Appearance: Well appearing in no acute distress  Abdomen: Soft, non tender, non distended with normal bowel sounds, no masses    Labs:  Lab Results   Component Value Date    WBC 7.24 02/20/2024    HGB 17.0 02/20/2024    HCT 50.5 02/20/2024     02/20/2024    CHOL 137 02/20/2024    TRIG 130 02/20/2024    HDL 46 02/20/2024    ALT 35 02/20/2024    AST 27 02/20/2024     02/20/2024    K 3.9 02/20/2024     02/20/2024    CREATININE 1.0 02/20/2024    BUN 15 02/20/2024    CO2 28 02/20/2024    TSH 1.151 02/20/2024    PSA 5.1 (H) 02/20/2024    GLUF 109 12/18/2009    HGBA1C 6.5 (H) 02/20/2024       I have explained the risks and benefits of this endoscopic procedure to the patient including but not limited to bleeding, inflammation, infection, perforation, and death.    SEDATION PLAN: per anesthesia       History reviewed, vital signs satisfactory, cardiopulmonary status satisfactory, sedation options, risks and plans have been discussed with the patient  All their questions were answered and the patient agrees to the sedation procedures as planned and the patient is deemed an appropriate candidate for the sedation as planned.     The risks, benefits and alternatives of the procedure were discussed with the patient in detail. This discussion was had in the presence of endoscopy staff. The risks include, risks of adverse reaction to sedation requiring the use of reversal agents, bleeding requiring blood transfusion, perforation requiring  surgical intervention and technical failure. Other risks include aspiration leading to respiratory distress and respiratory failure resulting in endotracheal intubation and mechanical ventilation including death. If anesthesia is being utilized for this procedure, it is up to the anesthesiologist to determine airway safety including elective endotracheal intubation. Questions were answered, they agree to proceed. There was no language barriers.       Procedure explained to patient, informed consent obtained and placed in chart.       Amrit Gomez MD

## 2024-05-27 NOTE — TRANSFER OF CARE
Anesthesia Transfer of Care Note    Patient: Rafa Padilla    Procedure(s) Performed: Procedure(s) (LRB):  COLONOSCOPY (N/A)    Patient location: PACU    Anesthesia Type: general    Transport from OR: Transported from OR on room air with adequate spontaneous ventilation    Post pain: adequate analgesia    Post assessment: no apparent anesthetic complications and tolerated procedure well    Post vital signs: stable    Level of consciousness: awake    Nausea/Vomiting: no nausea/vomiting    Complications: none    Transfer of care protocol was followed      Last vitals: Visit Vitals  /73 (BP Location: Right arm, Patient Position: Sitting)   Pulse 61   Temp 36.2 °C (97.2 °F) (Temporal)   Resp 18   Ht 6' (1.829 m)   Wt 97.1 kg (214 lb 1.1 oz)   SpO2 98%   BMI 29.03 kg/m²

## 2024-05-27 NOTE — PLAN OF CARE
Discharge instructions reviewed with patient and visitor. Handouts given & verbalized understanding with no further questions at this time. Dr Gomez spoke to pt at bedside, reviewed procedure and findings, answered questions. Made aware they are awaiting biopsy results with MD telephone number provided per AVS sheet. VSS on RA, no pain or nausea noted, tolerating po fluids, no complaints noted. Fall precautions reviewed, consents in chart, PIV removed at this time.

## 2024-05-27 NOTE — DISCHARGE SUMMARY
[facility]  Discharge Note  Short Stay    Procedure(s) (LRB):  COLONOSCOPY (N/A)      OUTCOME: Patient tolerated treatment/procedure well without complication and is now ready for discharge.    DISPOSITION: Home or Self Care    FINAL DIAGNOSIS:  <principal problem not specified>    FOLLOWUP: With primary care provider    DISCHARGE INSTRUCTIONS:  No discharge procedures on file.      Clinical Reference Documents Added to Patient Instructions         Document    COLON POLYPECTOMY DISCHARGE INSTRUCTIONS (ENGLISH)            TIME SPENT ON DISCHARGE: 20 minutes

## 2024-05-29 LAB
FINAL PATHOLOGIC DIAGNOSIS: NORMAL
GROSS: NORMAL
Lab: NORMAL

## 2024-06-05 ENCOUNTER — LAB VISIT (OUTPATIENT)
Dept: LAB | Facility: HOSPITAL | Age: 64
End: 2024-06-05
Attending: NURSE PRACTITIONER
Payer: COMMERCIAL

## 2024-06-05 DIAGNOSIS — E11.9 TYPE 2 DIABETES MELLITUS WITHOUT COMPLICATION, WITHOUT LONG-TERM CURRENT USE OF INSULIN: ICD-10-CM

## 2024-06-05 LAB
ALBUMIN SERPL BCP-MCNC: 3.9 G/DL (ref 3.5–5.2)
ALP SERPL-CCNC: 50 U/L (ref 55–135)
ALT SERPL W/O P-5'-P-CCNC: 22 U/L (ref 10–44)
ANION GAP SERPL CALC-SCNC: 7 MMOL/L (ref 8–16)
AST SERPL-CCNC: 19 U/L (ref 10–40)
BILIRUB SERPL-MCNC: 0.5 MG/DL (ref 0.1–1)
BUN SERPL-MCNC: 11 MG/DL (ref 8–23)
CALCIUM SERPL-MCNC: 9.7 MG/DL (ref 8.7–10.5)
CHLORIDE SERPL-SCNC: 104 MMOL/L (ref 95–110)
CHOLEST SERPL-MCNC: 115 MG/DL (ref 120–199)
CHOLEST/HDLC SERPL: 2.5 {RATIO} (ref 2–5)
CO2 SERPL-SCNC: 27 MMOL/L (ref 23–29)
CREAT SERPL-MCNC: 0.8 MG/DL (ref 0.5–1.4)
EST. GFR  (NO RACE VARIABLE): >60 ML/MIN/1.73 M^2
ESTIMATED AVG GLUCOSE: 140 MG/DL (ref 68–131)
GLUCOSE SERPL-MCNC: 123 MG/DL (ref 70–110)
HBA1C MFR BLD: 6.5 % (ref 4–5.6)
HDLC SERPL-MCNC: 46 MG/DL (ref 40–75)
HDLC SERPL: 40 % (ref 20–50)
LDLC SERPL CALC-MCNC: 56.4 MG/DL (ref 63–159)
NONHDLC SERPL-MCNC: 69 MG/DL
POTASSIUM SERPL-SCNC: 3.8 MMOL/L (ref 3.5–5.1)
PROT SERPL-MCNC: 6.9 G/DL (ref 6–8.4)
SODIUM SERPL-SCNC: 138 MMOL/L (ref 136–145)
TRIGL SERPL-MCNC: 63 MG/DL (ref 30–150)

## 2024-06-05 PROCEDURE — 80061 LIPID PANEL: CPT | Performed by: NURSE PRACTITIONER

## 2024-06-05 PROCEDURE — 80053 COMPREHEN METABOLIC PANEL: CPT | Performed by: NURSE PRACTITIONER

## 2024-06-05 PROCEDURE — 83036 HEMOGLOBIN GLYCOSYLATED A1C: CPT | Performed by: NURSE PRACTITIONER

## 2024-06-05 PROCEDURE — 36415 COLL VENOUS BLD VENIPUNCTURE: CPT | Mod: PN | Performed by: NURSE PRACTITIONER

## 2024-06-14 ENCOUNTER — OFFICE VISIT (OUTPATIENT)
Dept: PRIMARY CARE CLINIC | Facility: CLINIC | Age: 64
End: 2024-06-14
Payer: COMMERCIAL

## 2024-06-14 VITALS
SYSTOLIC BLOOD PRESSURE: 127 MMHG | HEIGHT: 72 IN | TEMPERATURE: 97 F | BODY MASS INDEX: 30.16 KG/M2 | HEART RATE: 76 BPM | OXYGEN SATURATION: 97 % | DIASTOLIC BLOOD PRESSURE: 72 MMHG | WEIGHT: 222.69 LBS

## 2024-06-14 DIAGNOSIS — I10 HYPERTENSION, UNSPECIFIED TYPE: ICD-10-CM

## 2024-06-14 DIAGNOSIS — E78.5 HYPERLIPIDEMIA, UNSPECIFIED HYPERLIPIDEMIA TYPE: ICD-10-CM

## 2024-06-14 DIAGNOSIS — M47.22 OSTEOARTHRITIS OF SPINE WITH RADICULOPATHY, CERVICAL REGION: ICD-10-CM

## 2024-06-14 DIAGNOSIS — Z63.6 CAREGIVER BURDEN: ICD-10-CM

## 2024-06-14 DIAGNOSIS — F17.218 CIGARETTE NICOTINE DEPENDENCE WITH OTHER NICOTINE-INDUCED DISORDER: ICD-10-CM

## 2024-06-14 DIAGNOSIS — E66.09 CLASS 1 OBESITY DUE TO EXCESS CALORIES WITH BODY MASS INDEX (BMI) OF 30.0 TO 30.9 IN ADULT, UNSPECIFIED WHETHER SERIOUS COMORBIDITY PRESENT: ICD-10-CM

## 2024-06-14 DIAGNOSIS — M51.36 DDD (DEGENERATIVE DISC DISEASE), LUMBAR: Chronic | ICD-10-CM

## 2024-06-14 DIAGNOSIS — E11.9 TYPE 2 DIABETES MELLITUS WITHOUT COMPLICATION, WITHOUT LONG-TERM CURRENT USE OF INSULIN: Primary | ICD-10-CM

## 2024-06-14 PROCEDURE — 99999 PR PBB SHADOW E&M-EST. PATIENT-LVL V: CPT | Mod: PBBFAC,,, | Performed by: NURSE PRACTITIONER

## 2024-06-14 PROCEDURE — 99214 OFFICE O/P EST MOD 30 MIN: CPT | Mod: S$GLB,,, | Performed by: NURSE PRACTITIONER

## 2024-06-14 SDOH — SOCIAL DETERMINANTS OF HEALTH (SDOH): DEPENDENT RELATIVE NEEDING CARE AT HOME: Z63.6

## 2024-06-14 NOTE — PROGRESS NOTES
HPI     Chief Complaint  Chief Complaint   Patient presents with    Follow-up     3 month follow up       HPI  Rafa Padilla is a 63 y.o. male with multiple medical diagnoses as listed in the medical history and problem list that presents for DM Type 2 Follow-up. This patient is known to me with a past appt.      DM Type 2 Follow-up: A1C remains 6.5. Fasting Glucose at 123. Fasting cholesterol well controlled. Non-compliant with taking Jardiance and  Metformin previously prescribed for management of DM. He prefers not to take medications. He explains cutting out saeed, ,cookies and late night snacking. Endorses care given burden that increases likely-osborne of mismanagement of health.     Wt Readings from Last 10 Encounters:   06/14/24 101 kg (222 lb 10.6 oz)   05/27/24 97.1 kg (214 lb 1.1 oz)   03/06/24 103 kg (227 lb)   03/05/24 103 kg (227 lb 1.2 oz)   02/20/24 103.8 kg (228 lb 13.4 oz)   08/31/23 99.6 kg (219 lb 9.3 oz)   07/03/23 98.8 kg (217 lb 13 oz)   04/12/23 98.7 kg (217 lb 9.5 oz)   02/14/23 99.7 kg (219 lb 12.8 oz)   01/04/23 98.1 kg (216 lb 2.6 oz)        History     PAST MEDICAL HISTORY:  Past Medical History:   Diagnosis Date    Anxiety     BPH (benign prostatic hyperplasia)     Degenerative disc disease     Hyperlipidemia     Hypertension 2/20/2024    Prediabetes     Spinal stenosis, lumbar     Tobacco use        PAST SURGICAL HISTORY:  Past Surgical History:   Procedure Laterality Date    COLONOSCOPY N/A 5/27/2024    Procedure: COLONOSCOPY;  Surgeon: Amrit Gomez MD;  Location: [facility];  Service: Endoscopy;  Laterality: N/A;       SOCIAL HISTORY:  Social History     Socioeconomic History    Marital status:      Spouse name: Aurora    Number of children: 2   Occupational History     Employer: [employer]   Tobacco Use    Smoking status: Every Day     Current packs/day: 0.50     Average packs/day: 0.5 packs/day for 40.0 years (20.0 ttl pk-yrs)     Types: Cigarettes      Passive exposure: Current    Smokeless tobacco: Never   Substance and Sexual Activity    Alcohol use: Yes     Comment: occasionally    Drug use: No    Sexual activity: Yes     Partners: Female     Birth control/protection: None       FAMILY HISTORY:  Family History   Problem Relation Name Age of Onset    Arthritis Mother      COPD Mother      Diabetes Mother      Heart disease Mother      Hypertension Mother      No Known Problems Father         ALLERGIES AND MEDICATIONS: updated and reviewed.  Review of patient's allergies indicates:  No Known Allergies  Current Outpatient Medications   Medication Sig Dispense Refill    amLODIPine (NORVASC) 5 MG tablet Take 1 tablet (5 mg total) by mouth once daily. Hypertension 90 tablet 3    aspirin 81 mg Tab Take by mouth. 1 Tablet Oral Every day      atorvastatin (LIPITOR) 20 MG tablet TAKE 1 TABLET(20 MG) BY MOUTH EVERY DAY 90 tablet 3    sildenafil (VIAGRA) 100 MG tablet 1/2 or 1 tab po q 24 hours prn sexual activity 15 tablet 1    tamsulosin (FLOMAX) 0.4 mg Cap TAKE 2 CAPSULES(0.8 MG) BY MOUTH EVERY  capsule 3     No current facility-administered medications for this visit.       Exam     ROS  Review of Systems   Constitutional:  Negative for appetite change, chills, fatigue and fever.   HENT:  Negative for congestion, ear pain, postnasal drip, rhinorrhea, sneezing, sore throat and tinnitus.    Respiratory:  Negative for cough and shortness of breath.    Cardiovascular:  Negative for chest pain and palpitations.   Gastrointestinal:  Negative for abdominal pain, constipation, diarrhea, nausea and vomiting.   Genitourinary:  Negative for difficulty urinating and dysuria.   Musculoskeletal:  Negative for arthralgias.   Neurological:  Negative for headaches.           Physical Exam  Vitals:    06/14/24 1101   BP: 127/72   Pulse: 76   Temp: 97.2 °F (36.2 °C)   SpO2: 97%   Weight: 101 kg (222 lb 10.6 oz)   Height: 6' (1.829 m)    Body mass index is 30.2 kg/m².  Weight: 101  kg (222 lb 10.6 oz)   Height: 6' (182.9 cm)   Physical Exam  Constitutional:       General: He is not in acute distress.     Appearance: Normal appearance. He is well-developed. He is obese.   HENT:      Head: Normocephalic and atraumatic.      Right Ear: External ear normal.      Left Ear: External ear normal.      Nose: Nose normal.      Mouth/Throat:      Pharynx: No oropharyngeal exudate.   Eyes:      Pupils: Pupils are equal, round, and reactive to light.   Cardiovascular:      Rate and Rhythm: Normal rate and regular rhythm.      Pulses: Normal pulses.      Heart sounds: No murmur heard.     No friction rub. No gallop.   Pulmonary:      Effort: Pulmonary effort is normal. No respiratory distress.      Breath sounds: Normal breath sounds. No wheezing.   Abdominal:      General: Bowel sounds are normal.      Palpations: Abdomen is soft.   Musculoskeletal:      Cervical back: Neck supple.   Lymphadenopathy:      Cervical: No cervical adenopathy.   Skin:     General: Skin is warm and dry.   Neurological:      Mental Status: He is alert and oriented to person, place, and time.   Psychiatric:         Mood and Affect: Mood normal.           Health Maintenance         Date Due Completion Date    Foot Exam Never done ---    Eye Exam Never done ---    RSV Vaccine (Age 60+ and Pregnant patients) (1 - 1-dose 60+ series) Never done ---    Shingles Vaccine (2 of 2) 04/16/2024 2/20/2024    Hemoglobin A1c 12/05/2024 6/5/2024    PROSTATE-SPECIFIC ANTIGEN 02/20/2025 2/20/2024    Diabetes Urine Screening 02/20/2025 2/20/2024    LDCT Lung Screen 02/26/2025 2/26/2024    Lipid Panel 06/05/2025 6/5/2024    Colorectal Cancer Screening 05/27/2029 5/27/2024    TETANUS VACCINE 02/20/2034 2/20/2024            Assessment & Plan     Assessment & Plan  Problem List Items Addressed This Visit          Neuro    DDD (degenerative disc disease), lumbar (Chronic)    DJD (degenerative joint disease), cervical       Cardiac/Vascular     Hyperlipidemia    Relevant Orders    Comprehensive Metabolic Panel    Hemoglobin A1C    Microalbumin/Creatinine Ratio, Urine    Hypertension    Relevant Orders    Ambulatory referral/consult to Optometry       Other    Nicotine dependence    Relevant Orders    Ambulatory referral/consult to Smoking Cessation Program     Other Visit Diagnoses       Type 2 diabetes mellitus without complication, without long-term current use of insulin    -  Primary  - Prefers diet management at this time    Relevant Orders    Comprehensive Metabolic Panel    Hemoglobin A1C    Microalbumin/Creatinine Ratio, Urine    Ambulatory referral/consult to Optometry    Class 1 obesity due to excess calories with body mass index (BMI) of 30.0 to 30.9 in adult, unspecified whether serious comorbidity present        Caregiver burden                  Health Maintenance reviewed: Deferred at this time.    Follow-up: 1. Schedule 3 month follow-up with Dr. Quinn in 3 months for DM.  2. Schedule fasting labs approx a week before visiting with Dr. Quinn.    30+ minutes of total time spent on the encounter, which includes face to face time and non-face to face time preparing to see the patient (eg, review of tests), Obtaining and/or reviewing separately obtained history, documenting clinical information in the electronic or other health record, independently interpreting results (not separately reported) and communicating results to the patient/family/caregiver, or Care coordination (not separately reported).      Sincerely,  Hernando Kraft NP

## 2024-06-14 NOTE — PATIENT INSTRUCTIONS
Meal Ideas for Regular Bariatric Diet/ or high protein diet plan from [facility]  *Recipes and products available at [email or web address]  Menu Plan: 7705-5428 Calories;  grams of Protein     DAY 1     Breakfast  ½ cup 2% cottage cheese (or  zero sugar oikos greek yogurt 6oz)  ¼ cup fruit (no sugar added)     Snack  2% mozzarella string cheese  10-20 grapes     Lunch  4-6oz Lean hamburger or turkey celso  1 slice low-fat cheese  ¼-1/2 cup green beans     Snack  200 calorie low-carb protein drink (4 grams sugar or less)     Dinner  4oz chicken thigh/ breast  ¼-1 cup cooked spinach      Snack  Atkins bar (15g protein)        DAY 2     Breakfast  1 -2egg with 1oz shredded cheddar cheese and 2T salsa     Snack  200 calorie low-carb protein drink (4 grams sugar or less)     Lunch  Lettuce Wraps: 4oz sliced turkey, 1 slice low-fat Swiss cheese, tomato, and mustard wrapped in a Edin lettuce leaf     Snack  ½ cup low-fat cottage cheese _(yogurt)  Pear cup (no sugar added)     Dinner  6oz baked fish  ½ cup cooked broccoli     Snack  Sugar-free pudding cup        DAY 3     Breakfast   ½ cup low-fat ricotta cheese w/ Splenda to luca  ½ scoop Vanilla protein powder   ¼ cup fresh fruit     Snack  2% string cheese  20 unsalted almonds     Lunch  Tuna/Chicken Salad: 4-6oz canned tuna/chicken, 1 egg white, and 1 tsp light mcclendon  Pineapple cup (no sugar added)     Snack  200 calorie low-carb protein drink (4 grams sugar or less)     Dinner  ½ -1baked pork chop   ¼ cup beans        DAY 4     Breakfast  200 calorie low-carb protein drink (4 grams sugar or less) ( premier protein)     Snack  Boiled egg 1-2     Lunch  ½-1 cup grilled shrimp  Salad w/ 2 tbsp crumbled fat-free feta  1 tbsp light vinaigrette     Snack  200 calorie low-carb protein drink (4 grams sugar or less)     Dinner  ¾ -1cup red beans     Snack  Mini Babybell light        DAY 5     Breakfast  [place] pie: 3oz Greek yogurt, 1 tbsp Splenda, ½  individual pack Crystal Light lemonade. Top with ¼ cup chopped walnuts      Snack  3-4 lean ham or turkey slices, ¼ - ½ cup fruit     Lunch  Fiesta Chicken: 2oz canned chicken, 1oz shredded cheddar cheese, ¼ cup black beans  Top with 2 tbsp salsa and a small dollop light sour cream     Snack  200 calorie low-carb protein drink (4 grams sugar or less)     Dinner  Omelette: ¼ cup Egg Beaters, 4 large (1oz) shrimp, 1oz shredded low-fat cheese. Add bell pepper, onion, mushrooms, green onions, or salsa, optional.        DAY 6     Breakfast  1 anika or 2 links turkey sausage  ½ cup fruit     Snack  200 calorie low-carb protein drink (4 grams sugar or less)     Lunch  Grilled tilapia  Salad of baby spinach leaves with light dressing     Snack  200 calorie low-carb protein drink (4 grams sugar or less)     Dinner  Chicken thigh simmered in 98% fat free cream of mushroom soup  ½ -1cup cooked green beans     Meal Ideas for Regular Bariatric Diet  *Recipes and products available at [email or web address]        Breakfast: (15-20g protein)    - Egg white omelet: 2 egg whites or ½ cup Egg Beaters. (Optional proteins: cheese, shrimp, black beans, chicken, sliced turkey) (Optional veggies: tomatoes, salsa, spinach, mushrooms, onions, green peppers, or small slice avocado)     - Egg and sausage: 1 egg or ¼ cup Egg Beaters (any variety), with 1 anika or 2 links of Turkey sausage or Veggie breakfast sausage (Play With Pictures / HangPic or Earthmill)     - Crust-less breakfast quiche: To make a glass pie dish, mix 4oz part skim Ricotta, 1 cup skim milk, and 2 eggs as your base. Add protein: shredded cheese, sliced lean ham or turkey, turkey hanson/sausage. Add veggies: tomato, onion, green onion, mushroom, green pepper, spinach, etc.     - Yogurt parfait: Mix 1 - 6oz container Dannon Light N Fit vanilla yogurt, with ¼ cup crushed unsalted nuts    - Cottage cheese and fruit: ½ cup part-skim cottage cheese or ricotta cheese topped with fresh fruit or  sugar free preserves     - Mirela Newman's Vanilla Egg custard* (add 2 Tbsp instant coffee granules to make Cappuccino Custard*)    - Hi-Protein café latte (skim milk, decaf coffee, 1 scoop protein powder). Optional to add Sugar free syrup or extract flavoring.     - Breakfast Lox: spread fat free cream cheese on slices of smoked salmon. Serve over scrambled or egg over easy (sauteed with nonstick cookspray) OR on a cucumber slice     - Eggwhich: Scramble or cook 1 large egg over easy using nonstick cookspray. Place between 2 slices of [place] hanson and low fat cheese.     Lunch: (20-30g protein)    - ½ cup Black bean soup (Homemade or Progresso), with ¼ cup shredded low-fat cheese. Top with chopped tomato or fresh salsa.     - Lean deli turkey breast and low-fat sliced cheese, mustard or light mcclendon to moisten, rolled up together, or wrapped in a Edin lettuce leaf    - Chicken salad made from dinner leftovers, moisten with low-fat salad dressing or light mcclendon. Also try leftover salmon, shrimp, tuna or boiled eggs. Serve ½ cup over dark green salad     - Fat-free canned refried beans, topped with ¼ cup shredded low-fat cheese. Top with chopped tomato or fresh salsa.      - Greek salad: Top mixed greens with 1-2oz grilled chicken, tomatoes, red onions, 2-3 kalamata olives, and sprinkle lightly with feta cheese. Spritz with Balsamic vinegar to taste.      - Crust-less lunch quiche: To make a glass pie dish, mix 4oz part skim Ricotta, 1 cup skim milk, and 2 eggs as your base. Add protein: shredded cheese, sliced lean ham or turkey, shrimp, chicken. Add veggies: tomato, onion, green onion, mushroom, green pepper, spinach, artichoke, broccoli, etc.    - Pizza bake: spread a  richy john mushroom with tomato sauce, low-fat shredded mozzarella and turkey pepperoni or [place] hanson. Add any veggies. Roast for 10-15 minutes, until cheese melted.      - Cucumber crab bites: Spread ¼ cup crab dip (lump crabmeat + light cream  cheese and green onions) over sliced cucumber.      - Chicken with light spinach and artichoke dip*: Puree in : 6oz cooked and drained spinach, 2 cloves garlic, 1 can cannelloni beans, ½ cup chopped green onions, 1 can drained artichoke hearts (not marinated in oil), lemon juice and basil. Mix in 2oz chopped up chicken.     Supper: (20-30g protein)    - Serve grilled fish over dark green salad tossed with low-fat dressing, served with grilled asparagus ring      - Rotisserie chicken salad: served with sliced strawberries, walnuts, fat-free feta cheese crumbles and 1 tbsp Temples Own Light Raspberry Veneta Vinaigrette    - Shrimp cocktail: Dip cold boiled shrimp in homemade low-sugar cocktail sauce (1/2 cup Ray One Carb ketchup, 2 tbsp horseradish, 1/4 tsp hot sauce, 1 tsp Worcestershire sauce, 1 tbsp freshly-squeezed lemon juice). Serve with dark green salad, walnuts, and crumbled blue cheese drizzled with olive oil and Balsamic vinegar     - Tuna Melt: Spread tuna salad onto 2 thick slices of tomato. Top with low-fat cheese and broil until cheese is melted. May also be made with chicken salad of shrimp salad. Patten with different types of cheeses.     - Chicken or beef fajitas (no tortilla, rice, beans, chips). Top meat and veggies w/ fresh salsa, fat free sour cream.     - Homemade low-fat Chili using extra lean ground beef or ground turkey. Top with shredded cheese and salsa as desired. May add dollop fat-free sour cream if desired     - Chicken parmesan: Top chicken breast w/ low sugar marinara sauce, mozzarella cheese and bake until chicken reaches 165*.  Serve w/ spaghetti SQUASH or Japanese cut green beans     - Dinner Omelet with shrimp or chicken and onion, green peppers and chives.     - No noodle lasagna: Use sliced zucchini or eggplant in place of noodles.  Layer with part skim ricotta cheese and low sugar meat sauce (use very lean ground beef or ground turkey).     - Mexican  chicken bake: Bake chunks of chicken breast or thigh with taco seasoning, Pace brand enchilada sauce, green onions and low-fat cheese. Serve with ¼ cup black beans or fat free refried beans topped with chopped tomatoes or salsa.    - Diaz frozen meatballs, simmered in Classico Marinara sauce. Different flavors of salsa or spaghetti sauce create different dishes! Sprinkle with parmesan cheese. Serve with grilled or steamed veggies, or a dark green salad.    - Simmer boneless skinless chicken thigh chunks in Classico Marinara sauce or roasted salsa until tender with chopped onion, bell pepper, garlic, mushrooms, spinach, etc.     - Hamburger or veggie burger, without the bun, dressed the way you like. Served with grilled or steamed veggies.     - Eggplant parmesan: Bake slices of eggplant at 350 degrees for 15 minutes. Layer tomato sauce, sliced eggplant and low-fat mozzarella cheese in a baking dish and cover with foil. Bake 30-40 more minutes or until bubbly. Uncover and bake at 400 degrees for about 15 more minutes, or until top is slightly crisp.     - Fish tacos: grilled/baked white fish, wrapped in Edin lettuce leaf, topped with salsa, shredded low-fat cheese, and light coleslaw.     - Chicken natalie: Sprinkle chicken w/ 1 tsp of hidden valley ranch dip mix. Then grill chicken and top with black beans, salsa and 1 tsp fat free sour cream.      - Cauliflower pizza crust: Use cauliflower as crust (see recipe on pinterest, no flour!). Top w/ low fat cheese, turkey pepperoni and veggies and bake again     - chicken or turkey crust pizza: use ground chicken or turkey instead of cauliflower, spread in [place] and bake at 350 for about 20-30 minutes(may want to add garlic, black pepper, oregano and other herbs to ground meat mixture).  Remove and top w/ low fat cheese, turkey pepperoni and veggies and bake again for another 10 minutes or until cheese is browned.      Snacks: (100-200 calories; >5g protein)      - 1 low-fat cheese stick with 8 cherry tomatoes or 1 serving fresh fruit  - 4 thin slices fat-free turkey breast and 1 slice low-fat cheese  - 4 thin slices fat-free honey ham with wedge of melon  - 6-8 edamame pods (equivalent to about 1/4 cup edamame without pods).   - 1/4 cup unsalted nuts with ½ cup fruit  - 6-oz container Dannon Light n Fit vanilla yogurt, topped with 1oz unsalted nuts         - apple, celery or baby carrots spread with 2 Tbsp PB2  - apple slices with 1 oz slice low-fat cheese  - Apple slices dipped in 2 Tbsp of PB2  - celery, cucumber, bell pepper or baby carrots dipped in ¼ cup hummus bean spread or light spinach and artichoke dip (*recipe in lunch section)  - celery, cucumber, baby carrots dipped in high protein greek yogurt (Mix 16 oz plain greek yogurt + 1 packet of hidden valley ranch dip mix)  - Ethan Links Beef Steak - 14g protein! (similar to beef jerky)  - 2 wedges Laughing Cow - Light Herb & Garlic Cheese with sliced cucumber or green bell pepper  - 1/2 cup low-fat cottage cheese with ¼ cup fruit or ¼ cup salsa  - RTD Protein drinks: Atkins, Low Carb Slim Fast, EAS light, Muscle Milk Light, etc.  - Homemade Protein drinks: GNC Soy95, Isopure, Nectar, UNJURY, Whey Gourmet, etc. Mix 1 scoop powder with 8oz skim/1% milk or light soymilk.  - Protein bars: Atkins, EAS, Pure Protein, Think Thin, Detour, etc. Must have 0-4 grams sugar - Read the label.     Takeout Options: No more than twice/week  Deli - Salads (no pasta or rice), meats, cheeses. Roasted chicken. Lox (salmon)     Mexican - Platters which don't include tortillas, chips, or rice. Go easy on the beans. Example: Fajitas without the tortillas. Ask the  not to bring chips to the table if they are too tempting.     Greek - Meat or fish and vegetable, but no bread or rice. Including hummus, baba ganoush, etc, is OK. Most sit-down Greek restaurants can provide you with cucumber slices for dipping instead of kashif bread.      Fast Food (Avoid as much as possible) - Salads (no croutons and limit salad dressing to 2 tbsp), grilled chicken sandwich without the bun and ask for no mcclendon. Najmas low fat chili or Taco Bell pintos and cheese.     BBQ - The meats are fine if you ask for sauces on the side, but most of the traditional side dishes are loaded with carbs. John slaw, baked beans and BBQ sauce are typically made with sugar.     Chinese - Nothing deep-fried, no rice or noodles. Many Chinese sauces have starch and sugar in them, so you'll have to use your judgement. If you find that these sauces trigger cravings, or cause Dumping, you can ask for the sauce to be made without sugar or just use soy sauce.

## 2024-07-01 ENCOUNTER — PATIENT OUTREACH (OUTPATIENT)
Dept: ADMINISTRATIVE | Facility: OTHER | Age: 64
End: 2024-07-01
Payer: COMMERCIAL

## 2024-07-29 ENCOUNTER — OFFICE VISIT (OUTPATIENT)
Dept: OPHTHALMOLOGY | Facility: CLINIC | Age: 64
End: 2024-07-29
Payer: COMMERCIAL

## 2024-07-29 DIAGNOSIS — I10 HYPERTENSION, UNSPECIFIED TYPE: ICD-10-CM

## 2024-07-29 DIAGNOSIS — E11.9 DIABETES MELLITUS TYPE 2 WITHOUT RETINOPATHY: Primary | ICD-10-CM

## 2024-07-29 PROCEDURE — 99999 PR PBB SHADOW E&M-EST. PATIENT-LVL III: CPT | Mod: PBBFAC,,, | Performed by: OPHTHALMOLOGY

## 2024-07-29 NOTE — PROGRESS NOTES
"HPI     Diabetic Eye Exam     Additional comments: Pt here for DM eye exam. No pain or discomfort. Pt   says he has "always had poor vision". No change in [facility] since last appt.            Comments    1. DM x 4/2024      Lab Results       Component                Value               Date                       HGBA1C                   6.5 (H)             06/05/2024                    Last edited by Chase Meyer MA on 7/29/2024  9:26 AM.            Assessment /Plan     For exam results, see Encounter Report.    Diabetes mellitus type 2 without retinopathy  Last A1c 6.5 . Diabetes controlled with no diabetic retinopathy on dilated exam. Reviewed diabetic eye precautions including excellent blood sugar control, and importance of regular follow up.               RTC 1 year sooner if needed      "

## 2024-08-21 ENCOUNTER — PATIENT OUTREACH (OUTPATIENT)
Dept: ADMINISTRATIVE | Facility: OTHER | Age: 64
End: 2024-08-21
Payer: COMMERCIAL

## 2024-08-21 NOTE — PROGRESS NOTES
CHW - Initial Contact    This LPN completed the Social Determinant of Health questionnaire with patient via telephone today.    Pt identified barriers of most importance are: Respite care   Referrals to community agencies completed with patient/caregiver consent outside of [facility] include:  No  Referrals were put through [facility] - no:   Support and Services: Completed SDOH questionnaire, researched respite care resources  Other information discussed the patient needs / wants help with: None   Follow up required: Yes  Follow-up Outreach - Due: 8/27/2024       LPN spoke to patient/caregiver as per [facility] referral for: Caregiver support    Does the patient consent to completing the assessment/enrollment: Yes  Does the patient consent for LPN to speak to a caregiver? No    Health Insurance Coverage:     Does the patient have adequate health insurance coverage? Yes  Education provided: Yes    PCP Follow-Up Appointments:    Does the patient have a primary care provider? yes - Dr. Jaky Quinn  Date of last PCP appointment? 6/14/24  Next PCP appointment:  11/20/24  Was patient provided with education surrounding PCP services/creating a medical home? yes - Educated on the benefits of having a PCP.  Educated on the use of urgent care clinic for non emergent issues when PCP unavailable.  Patient verbalized understanding.      Specialist Appointments:     Does the patient have a pending specialist referral? no  Does the patient have an upcoming specialist appointment? no  Is the patient pregnant? N/A  Does the patient have a mental health provider? no       Home Medications:     Reviewed medication list with patient? No  Is the patient able to afford their medications? Yes    Care Gaps:     Does the patient have any care gaps that are due? Yes    Care Gaps     Overdue          Never done Foot Exam (Yearly)     Never done RSV Vaccine (Age 60+ and Pregnant patients) (1 - 1-dose 60+ series)     APR 16 2024 Shingles Vaccine (2 of  2)  Last completed: Feb 20, 2024       Recent lab results:  Blood Sugar: N/A   Provided education: No  Blood Pressure: 160/70   Provided education: Yes  Instructed to take all medications as prescribed and to follow a low salt diet.  Patient verbalized understanding.        Social Determinants of Health (SDOH)    Patient's identified areas of need:  Respite care  Education/Resources provided:    Yes      Home Health/DME:    Current Home Health: No  Patient has all healthcare equipment/supplies indicated: no  Current DME: none    Additional Documentation:   Identity verified.  Patient states he is looking for respite care resources.  Offered to research and call the patient with results, patient accepted.  Patient verbalized understanding.        Follow up:   Patient agrees to scheduled follow up call. Yes

## 2024-09-05 NOTE — PROGRESS NOTES
CHW - Follow Up    This LPN completed a follow up visit with patient via telephone today.  Pt/Caregiver reported: Identity verified.  Informed patient of resources for respite care. [facility] [phone], [facility] [phone]  Patient verbalized understanding.  LPN provided: Respite care resources  Follow up required: Yes  Follow-up Outreach - Due: 9/19/2024

## 2024-09-25 ENCOUNTER — PATIENT OUTREACH (OUTPATIENT)
Dept: ADMINISTRATIVE | Facility: OTHER | Age: 64
End: 2024-09-25
Payer: COMMERCIAL

## 2024-10-16 NOTE — PROGRESS NOTES
CHW - Follow Up and Case Closure    This LPN completed a follow up visit with patient via telephone today.  Pt reported: Identity verified.  Patient states he has not contacted the resources provided to him for respite care.  He states he will call.  Patient denies any other needs.    Follow up required: No  Pt denied any additional needs at this time and agrees with episode closure at this time.  Provided patient with LPN's contact information and encouraged him to contact this LPN if additional needs arise.  Patient verbalized understanding.

## 2024-11-13 ENCOUNTER — LAB VISIT (OUTPATIENT)
Dept: LAB | Facility: HOSPITAL | Age: 64
End: 2024-11-13
Payer: MEDICARE

## 2024-11-13 DIAGNOSIS — E11.9 TYPE 2 DIABETES MELLITUS WITHOUT COMPLICATION, WITHOUT LONG-TERM CURRENT USE OF INSULIN: ICD-10-CM

## 2024-11-13 DIAGNOSIS — E78.5 HYPERLIPIDEMIA, UNSPECIFIED HYPERLIPIDEMIA TYPE: ICD-10-CM

## 2024-11-13 LAB
ALBUMIN SERPL BCP-MCNC: 4.2 G/DL (ref 3.5–5.2)
ALP SERPL-CCNC: 46 U/L (ref 40–150)
ALT SERPL W/O P-5'-P-CCNC: 21 U/L (ref 10–44)
ANION GAP SERPL CALC-SCNC: 9 MMOL/L (ref 8–16)
AST SERPL-CCNC: 29 U/L (ref 10–40)
BILIRUB SERPL-MCNC: 0.6 MG/DL (ref 0.1–1)
BUN SERPL-MCNC: 15 MG/DL (ref 8–23)
CALCIUM SERPL-MCNC: 9.7 MG/DL (ref 8.7–10.5)
CHLORIDE SERPL-SCNC: 105 MMOL/L (ref 95–110)
CO2 SERPL-SCNC: 25 MMOL/L (ref 23–29)
CREAT SERPL-MCNC: 0.9 MG/DL (ref 0.5–1.4)
EST. GFR  (NO RACE VARIABLE): >60 ML/MIN/1.73 M^2
ESTIMATED AVG GLUCOSE: 134 MG/DL (ref 68–131)
GLUCOSE SERPL-MCNC: 124 MG/DL (ref 70–110)
HBA1C MFR BLD: 6.3 % (ref 4–5.6)
POTASSIUM SERPL-SCNC: 3.6 MMOL/L (ref 3.5–5.1)
PROT SERPL-MCNC: 7.4 G/DL (ref 6–8.4)
SODIUM SERPL-SCNC: 139 MMOL/L (ref 136–145)

## 2024-11-13 PROCEDURE — 83036 HEMOGLOBIN GLYCOSYLATED A1C: CPT | Performed by: NURSE PRACTITIONER

## 2024-11-13 PROCEDURE — 80053 COMPREHEN METABOLIC PANEL: CPT | Performed by: NURSE PRACTITIONER

## 2024-11-20 ENCOUNTER — OFFICE VISIT (OUTPATIENT)
Dept: PRIMARY CARE CLINIC | Facility: CLINIC | Age: 64
End: 2024-11-20
Payer: MEDICARE

## 2024-11-20 VITALS
DIASTOLIC BLOOD PRESSURE: 84 MMHG | HEART RATE: 83 BPM | RESPIRATION RATE: 20 BRPM | HEIGHT: 72 IN | TEMPERATURE: 98 F | SYSTOLIC BLOOD PRESSURE: 162 MMHG | BODY MASS INDEX: 30.31 KG/M2 | WEIGHT: 223.75 LBS | OXYGEN SATURATION: 97 %

## 2024-11-20 DIAGNOSIS — R35.1 BENIGN PROSTATIC HYPERPLASIA WITH NOCTURIA: ICD-10-CM

## 2024-11-20 DIAGNOSIS — Z13.6 ENCOUNTER FOR ABDOMINAL AORTIC ANEURYSM (AAA) SCREENING: ICD-10-CM

## 2024-11-20 DIAGNOSIS — I10 HYPERTENSION, UNSPECIFIED TYPE: Primary | ICD-10-CM

## 2024-11-20 DIAGNOSIS — E11.9 TYPE 2 DIABETES MELLITUS WITHOUT COMPLICATION, WITHOUT LONG-TERM CURRENT USE OF INSULIN: ICD-10-CM

## 2024-11-20 DIAGNOSIS — N52.9 ERECTILE DYSFUNCTION, UNSPECIFIED ERECTILE DYSFUNCTION TYPE: ICD-10-CM

## 2024-11-20 DIAGNOSIS — E66.09 CLASS 1 OBESITY DUE TO EXCESS CALORIES WITH BODY MASS INDEX (BMI) OF 30.0 TO 30.9 IN ADULT, UNSPECIFIED WHETHER SERIOUS COMORBIDITY PRESENT: ICD-10-CM

## 2024-11-20 DIAGNOSIS — E78.5 HYPERLIPIDEMIA, UNSPECIFIED HYPERLIPIDEMIA TYPE: ICD-10-CM

## 2024-11-20 DIAGNOSIS — N40.1 BENIGN PROSTATIC HYPERPLASIA WITH NOCTURIA: ICD-10-CM

## 2024-11-20 DIAGNOSIS — E66.811 CLASS 1 OBESITY DUE TO EXCESS CALORIES WITH BODY MASS INDEX (BMI) OF 30.0 TO 30.9 IN ADULT, UNSPECIFIED WHETHER SERIOUS COMORBIDITY PRESENT: ICD-10-CM

## 2024-11-20 PROCEDURE — 99999 PR PBB SHADOW E&M-EST. PATIENT-LVL V: CPT | Mod: PBBFAC,,, | Performed by: FAMILY MEDICINE

## 2024-11-20 PROCEDURE — G2211 COMPLEX E/M VISIT ADD ON: HCPCS | Mod: S$PBB,,, | Performed by: FAMILY MEDICINE

## 2024-11-20 PROCEDURE — 99215 OFFICE O/P EST HI 40 MIN: CPT | Mod: S$PBB,,, | Performed by: FAMILY MEDICINE

## 2024-11-20 PROCEDURE — 99215 OFFICE O/P EST HI 40 MIN: CPT | Mod: PBBFAC,PN | Performed by: FAMILY MEDICINE

## 2024-11-20 RX ORDER — SILDENAFIL 100 MG/1
TABLET, FILM COATED ORAL
Qty: 15 TABLET | Refills: 1 | Status: SHIPPED | OUTPATIENT
Start: 2024-11-20

## 2024-11-20 NOTE — PROGRESS NOTES
Chief Complaint  Chief Complaint   Patient presents with    Follow-up     3 month        HPI  Rafa Padilla is a 64 y.o. male with multiple medical diagnoses as listed in the medical history and problem list that presents for  in person visit.     History of Present Illness    CHIEF COMPLAINT:  - Patient presents for a follow-up visit to discuss blood pressure management, diabetes control, and urinary symptoms.    HPI:  Patient's blood pressure is elevated today, with a reading of 160/76, higher than his previous visit in June (127/72). He had been taking amlodipine at night as instructed by the nurse practitioner, Hernando, potentially affecting its efficacy.    Patient has been reducing his sugar intake, particularly decreasing soda consumption from daily to about 3 cans a week. He continues to eat white bread for breakfast. His A1C has decreased from 6.5 to 6.3 since June. His fasting glucose was 124 on the morning of the lab work.    Patient reports urinary symptoms, including increased frequency. He had previously consulted Dr. Holland, a urologist, for these concerns. He had a weak urinary stream and nocturia 5 times per night. He has been taking Tamsulosin (Flomax) for this issue, which has improved his symptoms. He now reports nocturia only twice per night.    Patient reports side effects affecting his sexual function, possibly related to his blood pressure medication. He inquires about renewing his Viagra prescription to address this issue.    Patient denies currently drinking coffee, tea, or consuming large amounts of soda. Patient denies any decline in kidney function.    MEDICATIONS:  - Lipitor (cholesterol medicine), taken daily for years  - Amlodipine 5 mg, daily, for blood pressure  - Tamsulosin (Flomax), for BPH (benign prostatic hyperplasia)  - Viagra, as needed for erectile dysfunction    PMH:  - Hypertension.  Diabetes.  BPH    SOCIAL HISTORY:  - Smoking: History of smoking, quit date not specified          No questionnaires on file.       Pmh, Psh, Family Hx, Social Hx, HM updated in Epic Tabs today.    Review of Systems   Constitutional:  Negative for activity change, appetite change, chills and fatigue.   HENT:  Negative for sore throat and trouble swallowing.    Respiratory:  Negative for cough and shortness of breath.    Cardiovascular:  Negative for chest pain and leg swelling.   Gastrointestinal:  Negative for abdominal pain, constipation, diarrhea and nausea.   Genitourinary:  Positive for frequency and urgency. Negative for difficulty urinating, dysuria and flank pain.   Musculoskeletal:  Negative for arthralgias and joint swelling.   Neurological:  Negative for dizziness and headaches.        Objective:     Vitals:    11/20/24 1343   BP: (!) 162/84   BP Location: Right arm   Patient Position: Sitting   Pulse: 83   Resp: 20   Temp: 97.5 °F (36.4 °C)   TempSrc: Tympanic   SpO2: 97%   Weight: 101.5 kg (223 lb 12.3 oz)   Height: 6' (1.829 m)     Wt Readings from Last 10 Encounters:   11/20/24 101.5 kg (223 lb 12.3 oz)   06/14/24 101 kg (222 lb 10.6 oz)   05/27/24 97.1 kg (214 lb 1.1 oz)   03/06/24 103 kg (227 lb)   03/05/24 103 kg (227 lb 1.2 oz)   02/20/24 103.8 kg (228 lb 13.4 oz)   08/31/23 99.6 kg (219 lb 9.3 oz)   07/03/23 98.8 kg (217 lb 13 oz)   04/12/23 98.7 kg (217 lb 9.5 oz)   02/14/23 99.7 kg (219 lb 12.8 oz)     Physical Exam    Vitals: Blood pressure elevated.  TEST RESULTS:  - A1C: June, 6.5%  A1C: Recent, 6.3%  Fasting glucose: Recent, 124-125 mg/dL.  Kidney function: Recent, fine.  Electrolytes: Recent, good.  Liver tests: Recent, fine.  Cholesterol: Previous test, looked good.       Physical Exam  Vitals and nursing note reviewed.   Constitutional:       General: He is awake.      Appearance: Normal appearance. He is well-developed and well-groomed. He is obese.   HENT:      Head: Normocephalic and atraumatic.      Right Ear: External ear normal.      Left Ear: External ear normal.       Nose: Nose normal.   Eyes:      Conjunctiva/sclera: Conjunctivae normal.   Neck:      Thyroid: No thyromegaly or thyroid tenderness.   Cardiovascular:      Rate and Rhythm: Normal rate and regular rhythm.      Heart sounds: Normal heart sounds.   Pulmonary:      Effort: Pulmonary effort is normal. No accessory muscle usage.      Breath sounds: Normal breath sounds.   Musculoskeletal:      Cervical back: Normal range of motion and neck supple.   Neurological:      General: No focal deficit present.      Mental Status: He is alert. Mental status is at baseline.   Psychiatric:         Attention and Perception: Attention normal.         Mood and Affect: Mood normal.         Speech: Speech normal.         Behavior: Behavior normal. Behavior is cooperative.         Thought Content: Thought content normal.         Judgment: Judgment normal.         Assessment:     1. Hypertension, unspecified type    2. Type 2 diabetes mellitus without complication, without long-term current use of insulin    3. Hyperlipidemia, unspecified hyperlipidemia type    4. Benign prostatic hyperplasia with nocturia    5. Erectile dysfunction, unspecified erectile dysfunction type    6. Class 1 obesity due to excess calories with body mass index (BMI) of 30.0 to 30.9 in adult, unspecified whether serious comorbidity present    7. Encounter for abdominal aortic aneurysm (AAA) screening        LABS:   Lab Results   Component Value Date    HGBA1C 6.3 (H) 11/13/2024    HGBA1C 6.5 (H) 06/05/2024    HGBA1C 6.5 (H) 02/20/2024      Lab Results   Component Value Date    CHOL 115 (L) 06/05/2024    CHOL 137 02/20/2024    CHOL 162 09/15/2022     Lab Results   Component Value Date    LDLCALC 56.4 (L) 06/05/2024    LDLCALC 65.0 02/20/2024    LDLCALC 96.4 09/15/2022     Lab Results   Component Value Date    WBC 7.24 02/20/2024    HGB 17.0 02/20/2024    HCT 50.5 02/20/2024     02/20/2024    CHOL 115 (L) 06/05/2024    TRIG 63 06/05/2024    HDL 46 06/05/2024     ALT 21 11/13/2024    AST 29 11/13/2024     11/13/2024    K 3.6 11/13/2024     11/13/2024    CREATININE 0.9 11/13/2024    BUN 15 11/13/2024    CO2 25 11/13/2024    TSH 1.151 02/20/2024    PSA 5.1 (H) 02/20/2024    GLUF 109 12/18/2009    HGBA1C 6.3 (H) 11/13/2024       Plan:   Rafa was seen today for follow-up.    Diagnoses and all orders for this visit:    Hypertension, unspecified type  -     Hemoglobin A1C; Future  -     CBC Without Differential; Future  -     Comprehensive Metabolic Panel; Future  -     TSH; Future  -     T4, Free; Future  -     Lipid Panel; Future  -     Microalbumin/Creatinine Ratio, Urine; Future    Type 2 diabetes mellitus without complication, without long-term current use of insulin  -     Hemoglobin A1C; Future  -     CBC Without Differential; Future  -     Comprehensive Metabolic Panel; Future  -     TSH; Future  -     T4, Free; Future  -     Lipid Panel; Future  -     Microalbumin/Creatinine Ratio, Urine; Future    Hyperlipidemia, unspecified hyperlipidemia type  -     Hemoglobin A1C; Future  -     CBC Without Differential; Future  -     Comprehensive Metabolic Panel; Future  -     TSH; Future  -     T4, Free; Future  -     Lipid Panel; Future  -     Microalbumin/Creatinine Ratio, Urine; Future    Benign prostatic hyperplasia with nocturia  -     Ambulatory referral/consult to Urology; Future  -     Hemoglobin A1C; Future  -     CBC Without Differential; Future  -     Comprehensive Metabolic Panel; Future  -     TSH; Future  -     T4, Free; Future  -     Lipid Panel; Future  -     Microalbumin/Creatinine Ratio, Urine; Future    Erectile dysfunction, unspecified erectile dysfunction type  -     sildenafiL (VIAGRA) 100 MG tablet; 1/2 or 1 tab po q 24 hours prn sexual activity  -     Hemoglobin A1C; Future  -     CBC Without Differential; Future  -     Comprehensive Metabolic Panel; Future  -     TSH; Future  -     T4, Free; Future  -     Lipid Panel; Future  -      Microalbumin/Creatinine Ratio, Urine; Future    Class 1 obesity due to excess calories with body mass index (BMI) of 30.0 to 30.9 in adult, unspecified whether serious comorbidity present  -     Hemoglobin A1C; Future  -     CBC Without Differential; Future  -     Comprehensive Metabolic Panel; Future  -     TSH; Future  -     T4, Free; Future  -     Lipid Panel; Future  -     Microalbumin/Creatinine Ratio, Urine; Future    Encounter for abdominal aortic aneurysm (AAA) screening  -     US Abdominal Aorta; Future        Assessment & Plan    IMPRESSION:  - Assessed blood pressure control; considering medication adjustment due to elevated readings  - Evaluated diabetes management; noted improvement in A1C from 6.5 to 6.3, indicating progress with dietary changes  - Considered Jardiance for diabetes and blood pressure management, but deferred due to patient's urinary concerns  - Reviewed cholesterol management; deemed current treatment effective based on previous results  - Evaluated urinary symptoms; noted improvement with current Tamsulosin (Flomax) regimen  - Recommend abdominal aortic aneurysm screening due to patient's age and smoking history    PLAN SUMMARY:  - Referred to Urology follow-up with Dr. Holland for urinary symptoms  - Changed Amlodipine administration time from nighttime to morning  - Continued Tamsulosin (Flomax)  - Continued Lipitor (cholesterol medication)  - Ordered abdominal aortic aneurysm screening (ultrasound)  - Refilled Viagra  - Follow up in 2 weeks with blood pressure readings via patient portal message  - Follow up with nurse for blood pressure check by December 10th if not checked at urology appointment  - Follow up in 6 months with labs prior to visit if blood pressure is well-controlled    DIABETES:  - Explained benefits of Jardiance, including positive effects on kidney function and prevention of diabetes complications.  - Clarified goals for fasting glucose (under 100) and A1C (under  5.7) for diabetes management.  - Discussed importance of complex carbohydrates and fiber in diet for blood sugar control.  - Patient to continue reducing soda intake and increase water consumption.  - Patient to consider reducing white bread consumption or switching to wheat bread.  - Patient to maintain current dietary changes for diabetes management.    HYPERTENSION:  - Changed Amlodipine: Move administration time from nighttime to morning.  - Follow up in 2 weeks with blood pressure readings via patient portal message.  - Follow up with nurse for blood pressure check by December 10th if not checked at urology appointment.  - Follow up in 6 months with labs prior to visit if blood pressure is well-controlled.    BENIGN PROSTATIC HYPERPLASIA:  - Continued Tamsulosin (Flomax).    HYPERLIPIDEMIA:  - Continued Lipitor (cholesterol medication).    ERECTILE DYSFUNCTION:  - Refilled Viagra.    ABDOMINAL AORTIC ANEURYSM SCREENING:  - Abdominal aortic aneurysm screening (ultrasound) ordered.    UROLOGY REFERRAL:  - Referred to Urology follow-up with Dr. Holland for urinary symptoms.         CT Chest Lung Screening Low Dose  Narrative: EXAM: LOW DOSE LUNG CANCER SCREENING CT    CLINICAL HISTORY:  Risk factors for lung cancer.  Tobacco use.    COMPARISON: 10/19/2022    TECHNIQUE:  Axial CT imaging was performed of the chest utilizing a   low-dose protocol.    Computed tomography dose index (CTDI):  4.77 mGy  Dose-length product (DLP):  181.63 mGy-cm    FINDINGS:    No suspicious pulmonary nodules.  Tiny stable nodule within or abutting   the right minor fissure versus fissural lymph node..    No adenopathy.  Moderately calcified coronary arteries.  Impression: No suspicious pulmonary nodules    CATEGORY (ACR Lung-RADS Version 1.0):  Category 1: Negative for pulmonary nodule greater than 4mm OR definitely   benign nodules. Probability for malignancy is less than 1%. Recommend   annual Low Dose CT  screening.    MODIFIER:    None    All CT scans at [this location] are performed using dose modulation   techniques as appropriate to a performed exam including the following:   automated exposure control; adjustment of the mA and/or kV according to   patient size (this includes techniques or standardized protocols for   targeted exams where dose is matched to indication / reason for exam; i.e.   extremities or head); use of iterative reconstruction technique.    Finalized on: 2/26/2024 3:10 PM By:  Arun Lemus MD  [facility]# [id number]      2024-02-26 15:13:02.822    DIAZ    The ASCVD Risk score (Sharath DEY, et al., 2019) failed to calculate for the following reasons:    The valid total cholesterol range is 130 to 320 mg/dL    Follow-up: Follow up in about 6 months (around 5/20/2025) for f/u OV Hernando Kraft NP or Dr. Quinn 6 mo DM/HTN f/u .    I spent a total of    45   minutes face to face and non-face to face on the date of this visit.This includes time preparing to see the patient (eg, review of tests, notes), obtaining and/or reviewing additional history from an independent historian and/or outside medical records, documenting clinical information in the electronic health record, independently interpreting results and/or communicating results to the patient/family/caregiver, or care coordinator.  Visit today included increased complexity associated with the care of the episodic problem addressed and managing the longitudinal care of the patient due to the serious and/or complex managed problem(s).    This note was generated with the assistance of ambient listening technology. Verbal consent was obtained by the patient and accompanying visitor(s) for the recording of patient appointment to facilitate this note. I attest to having reviewed and edited the generated note for accuracy, though some syntax or spelling errors may persist. Please contact the author of this note for any clarification.       There are no Patient  Instructions on file for this visit.

## 2024-11-21 ENCOUNTER — HOSPITAL ENCOUNTER (OUTPATIENT)
Dept: RADIOLOGY | Facility: HOSPITAL | Age: 64
Discharge: HOME | End: 2024-11-21
Attending: FAMILY MEDICINE
Payer: MEDICARE

## 2024-11-21 DIAGNOSIS — Z13.6 ENCOUNTER FOR ABDOMINAL AORTIC ANEURYSM (AAA) SCREENING: ICD-10-CM

## 2024-11-21 PROCEDURE — 76775 US EXAM ABDO BACK WALL LIM: CPT | Mod: TC

## 2024-11-21 PROCEDURE — 76775 US EXAM ABDO BACK WALL LIM: CPT | Mod: 26,,, | Performed by: STUDENT IN AN ORGANIZED HEALTH CARE EDUCATION/TRAINING PROGRAM

## 2024-12-11 ENCOUNTER — OFFICE VISIT (OUTPATIENT)
Dept: UROLOGY | Facility: CLINIC | Age: 64
End: 2024-12-11
Payer: MEDICARE

## 2024-12-11 ENCOUNTER — LAB VISIT (OUTPATIENT)
Dept: LAB | Facility: HOSPITAL | Age: 64
End: 2024-12-11
Attending: UROLOGY
Payer: MEDICARE

## 2024-12-11 VITALS — HEIGHT: 72 IN | BODY MASS INDEX: 30.31 KG/M2 | WEIGHT: 223.75 LBS

## 2024-12-11 DIAGNOSIS — R35.1 BENIGN PROSTATIC HYPERPLASIA WITH NOCTURIA: ICD-10-CM

## 2024-12-11 DIAGNOSIS — N40.1 BENIGN PROSTATIC HYPERPLASIA WITH NOCTURIA: ICD-10-CM

## 2024-12-11 DIAGNOSIS — R97.20 ELEVATED PSA: ICD-10-CM

## 2024-12-11 DIAGNOSIS — R97.20 ELEVATED PSA: Primary | ICD-10-CM

## 2024-12-11 LAB — COMPLEXED PSA SERPL-MCNC: 5.6 NG/ML (ref 0–4)

## 2024-12-11 PROCEDURE — 99999 PR PBB SHADOW E&M-EST. PATIENT-LVL III: CPT | Mod: PBBFAC,,, | Performed by: UROLOGY

## 2024-12-11 PROCEDURE — 99214 OFFICE O/P EST MOD 30 MIN: CPT | Mod: S$PBB,,, | Performed by: UROLOGY

## 2024-12-11 PROCEDURE — 99213 OFFICE O/P EST LOW 20 MIN: CPT | Mod: PBBFAC | Performed by: UROLOGY

## 2024-12-11 PROCEDURE — 84153 ASSAY OF PSA TOTAL: CPT | Performed by: UROLOGY

## 2024-12-11 PROCEDURE — 36415 COLL VENOUS BLD VENIPUNCTURE: CPT | Performed by: UROLOGY

## 2024-12-11 NOTE — PROGRESS NOTES
Chief Complaint:  Frequency, nocturia, weak stream, elevated PSA    HPI:   12/11/2024 - returns today for follow-up, unfortunately did not get his PSA drawn, continues to note issues with weak stream even with the double dose Flomax, still waking up 5 times per night, has not limited his fluids, notes ED, Viagra somewhat helpful, no gross hematuria or dysuria  IPSS - 4/3/5/5/5/4/5 = 31  QOL - 4(mostly dissatisfied)    03/06/2024 - 62yo male that presents as a referral from Dr. Quinn for the above issues.  Patient notes a recent onset of difficulty with weak stream, frequency Q 1 hour, and nocturia up to 5 times per night.  Patient has been on Flomax for a significant period of time and notes that the Flomax does help with his stream. Patient is also on Jardiance, was recently started on this for diabetes.  Patient notes that he mostly drinks water during the day, denies any coffee or tea or sodas.  He also had routine labs obtained which showed a PSA 5.1, no prior elevated values in our system.  PSA in September 2022 4.0.  He denies gross hematuria or dysuria.  Denies family history of  cancers.      PMH:  Past Medical History:   Diagnosis Date    Anxiety     BPH (benign prostatic hyperplasia)     Degenerative disc disease     Hyperlipidemia     Hypertension 2/20/2024    Prediabetes     Spinal stenosis, lumbar     Tobacco use        PSH:  Past Surgical History:   Procedure Laterality Date    COLONOSCOPY N/A 5/27/2024    Procedure: COLONOSCOPY;  Surgeon: Amrit Gomez MD;  Location: [facility];  Service: Endoscopy;  Laterality: N/A;       Family History:  Family History   Problem Relation Name Age of Onset    Arthritis Mother      COPD Mother      Diabetes Mother      Heart disease Mother      Hypertension Mother      No Known Problems Father         Social History:  Social History     Tobacco Use    Smoking status: Every Day     Current packs/day: 0.50     Average packs/day: 0.5 packs/day for 40.0 years  (20.0 ttl pk-yrs)     Types: Cigarettes     Passive exposure: Current    Smokeless tobacco: Never   Substance Use Topics    Alcohol use: Yes     Comment: occasionally    Drug use: No        Review of Systems:  General: No fever, chills  Skin: No rashes  Chest:  Denies cough and sputum production  Heart: Denies chest pain  Resp: Denies dyspnea  Abdomen: Denies diarrhea, abdominal pain, hematemesis, or blood in stool.  Musculoskeletal: No joint stiffness or swelling. Denies back pain.  : see HPI  Neuro: no dizziness or weakness    Allergies:  Patient has no known allergies.    Medications:    Current Outpatient Medications:     amLODIPine (NORVASC) 5 MG tablet, Take 1 tablet (5 mg total) by mouth once daily. Hypertension, Disp: 90 tablet, Rfl: 3    aspirin 81 mg Tab, Take by mouth. 1 Tablet Oral Every day, Disp: , Rfl:     atorvastatin (LIPITOR) 20 MG tablet, TAKE 1 TABLET(20 MG) BY MOUTH EVERY DAY, Disp: 90 tablet, Rfl: 3    sildenafiL (VIAGRA) 100 MG tablet, 1/2 or 1 tab po q 24 hours prn sexual activity, Disp: 15 tablet, Rfl: 1    tamsulosin (FLOMAX) 0.4 mg Cap, TAKE 2 CAPSULES(0.8 MG) BY MOUTH EVERY DAY, Disp: 180 capsule, Rfl: 3    Physical Exam:  There were no vitals filed for this visit.    Body mass index is 30.35 kg/m².  General: awake, alert, cooperative  Head: NC/AT  Ears: external ears normal  Eyes: sclera normal  Lungs: normal inspiration, NAD  Heart: well-perfused  Abdomen: Soft, NT, ND   3/24: Normal uncirc'd phallus, meatus normal in size and position, BL testicles palpable, no masses, nontender, no abnormalities of epididymi  WINNIE 3/24: Normal rectal tone, no hemorrhoids. Prostate smooth and normal, no nodules 30 gm SV not palpable. Perineum and anus normal.  Lymphatic: groin nodes negative  Skin: The skin is warm and dry  Ext: No c/c/e.  Neuro: grossly intact, AOx3    RADIOLOGY:  No recent relevant imaging available for review.    LABS:  I personally reviewed the following lab values:  Lab Results    Component Value Date    WBC 7.24 02/20/2024    HGB 17.0 02/20/2024    HCT 50.5 02/20/2024     02/20/2024     11/13/2024    K 3.6 11/13/2024     11/13/2024    CREATININE 0.9 11/13/2024    BUN 15 11/13/2024    CO2 25 11/13/2024    TSH 1.151 02/20/2024    PSA 5.1 (H) 02/20/2024    GLUF 109 12/18/2009    HGBA1C 6.3 (H) 11/13/2024    CHOL 115 (L) 06/05/2024    TRIG 63 06/05/2024    HDL 46 06/05/2024    ALT 21 11/13/2024    AST 29 11/13/2024       Assessment/Plan:   Rafa Padilla is a 64 y.o. male with:    Elevated PSA - repeat PSA today, if it continues to be elevated, we will proceed with MRI    Nocturia - limit fluids 2-3 hours prior to bed    BPH with weak stream - continue Flomax    ED - continue Viagra, reviewed appropriate administration    Arun Holland MD  Urology

## 2025-03-14 DIAGNOSIS — I10 HYPERTENSION, UNSPECIFIED TYPE: ICD-10-CM

## 2025-03-14 RX ORDER — AMLODIPINE BESYLATE 5 MG/1
TABLET ORAL
Qty: 90 TABLET | Refills: 2 | Status: SHIPPED | OUTPATIENT
Start: 2025-03-14

## 2025-03-14 NOTE — TELEPHONE ENCOUNTER
Refill Routing Note   Medication(s) are not appropriate for processing by [facility] for the following reason(s):        Required vitals abnormal    [facility] action(s):  Defer        Medication Therapy Plan: FLOS      Appointments  past 12m or future 3m with PCP    Date Provider   Last Visit   11/20/2024 Jaky Quinn MD   Next Visit   5/21/2025 Jaky Quinn MD   ED visits in past 90 days: 0        Note composed:10:57 AM 03/14/2025

## 2025-03-14 NOTE — TELEPHONE ENCOUNTER
Care Due:                  Date            Visit Type   Department     Provider  --------------------------------------------------------------------------------                                EP -                              PRIMARY      [facility] PRIMARY  Last Visit: 11-      CARE ([facility])   CARE           Jaky Quinn                              EP -                              PRIMARY      [facility] PRIMARY  Next Visit: 05-      CARE ([facility])   LIANNE Quinn                                                            Last  Test          Frequency    Reason                     Performed    Due Date  --------------------------------------------------------------------------------    Lipid Panel.  12 months..  atorvastatin.............  06- 06-    [facility] Embedded Care Due Messages. Reference number: [id number].   3/14/2025 5:52:50 AM CDT

## 2025-04-16 DIAGNOSIS — N40.0 BENIGN PROSTATIC HYPERPLASIA WITHOUT LOWER URINARY TRACT SYMPTOMS: ICD-10-CM

## 2025-04-16 DIAGNOSIS — E78.5 HYPERLIPIDEMIA, UNSPECIFIED HYPERLIPIDEMIA TYPE: ICD-10-CM

## 2025-04-16 RX ORDER — TAMSULOSIN HYDROCHLORIDE 0.4 MG/1
0.8 CAPSULE ORAL
Qty: 180 CAPSULE | Refills: 2 | Status: SHIPPED | OUTPATIENT
Start: 2025-04-16

## 2025-04-16 RX ORDER — ATORVASTATIN CALCIUM 20 MG/1
20 TABLET, FILM COATED ORAL
Qty: 90 TABLET | Refills: 0 | Status: SHIPPED | OUTPATIENT
Start: 2025-04-16

## 2025-04-16 NOTE — TELEPHONE ENCOUNTER
No care due was identified.  [facility] Embedded Care Due Messages. Reference number: [id number].   4/16/2025 5:54:47 AM CDT

## 2025-04-16 NOTE — TELEPHONE ENCOUNTER
Refill Decision Note   Rafa Padilla  is requesting a refill authorization.  Brief Assessment and Rationale for Refill:  Approve     Medication Therapy Plan:         Comments:     Note composed:11:49 AM 04/16/2025

## 2025-05-13 ENCOUNTER — PATIENT OUTREACH (OUTPATIENT)
Dept: ADMINISTRATIVE | Facility: HOSPITAL | Age: 65
End: 2025-05-13
Payer: MEDICARE

## 2025-05-14 ENCOUNTER — LAB VISIT (OUTPATIENT)
Dept: LAB | Facility: HOSPITAL | Age: 65
End: 2025-05-14
Attending: FAMILY MEDICINE
Payer: MEDICARE

## 2025-05-14 DIAGNOSIS — E66.811 CLASS 1 OBESITY DUE TO EXCESS CALORIES WITH BODY MASS INDEX (BMI) OF 30.0 TO 30.9 IN ADULT, UNSPECIFIED WHETHER SERIOUS COMORBIDITY PRESENT: ICD-10-CM

## 2025-05-14 DIAGNOSIS — N52.9 ERECTILE DYSFUNCTION, UNSPECIFIED ERECTILE DYSFUNCTION TYPE: ICD-10-CM

## 2025-05-14 DIAGNOSIS — R35.1 BENIGN PROSTATIC HYPERPLASIA WITH NOCTURIA: ICD-10-CM

## 2025-05-14 DIAGNOSIS — E66.09 CLASS 1 OBESITY DUE TO EXCESS CALORIES WITH BODY MASS INDEX (BMI) OF 30.0 TO 30.9 IN ADULT, UNSPECIFIED WHETHER SERIOUS COMORBIDITY PRESENT: ICD-10-CM

## 2025-05-14 DIAGNOSIS — I10 HYPERTENSION, UNSPECIFIED TYPE: ICD-10-CM

## 2025-05-14 DIAGNOSIS — N40.1 BENIGN PROSTATIC HYPERPLASIA WITH NOCTURIA: ICD-10-CM

## 2025-05-14 DIAGNOSIS — E11.9 TYPE 2 DIABETES MELLITUS WITHOUT COMPLICATION, WITHOUT LONG-TERM CURRENT USE OF INSULIN: ICD-10-CM

## 2025-05-14 DIAGNOSIS — E78.5 HYPERLIPIDEMIA, UNSPECIFIED HYPERLIPIDEMIA TYPE: ICD-10-CM

## 2025-05-14 LAB
ALBUMIN/CREAT UR: 65.4 UG/MG
CREAT UR-MCNC: 133 MG/DL (ref 23–375)
EAG (OHS): 134 MG/DL (ref 68–131)
ERYTHROCYTE [DISTWIDTH] IN BLOOD BY AUTOMATED COUNT: 13.4 % (ref 11.5–14.5)
HBA1C MFR BLD: 6.3 % (ref 4–5.6)
HCT VFR BLD AUTO: 49.7 % (ref 40–54)
HGB BLD-MCNC: 16.1 GM/DL (ref 14–18)
MCH RBC QN AUTO: 30.5 PG (ref 27–31)
MCHC RBC AUTO-ENTMCNC: 32.4 G/DL (ref 32–36)
MCV RBC AUTO: 94 FL (ref 82–98)
MICROALBUMIN UR-MCNC: 87 UG/ML (ref ?–5000)
PLATELET # BLD AUTO: 254 K/UL (ref 150–450)
PMV BLD AUTO: 10.7 FL (ref 9.2–12.9)
RBC # BLD AUTO: 5.28 M/UL (ref 4.6–6.2)
WBC # BLD AUTO: 7.83 K/UL (ref 3.9–12.7)

## 2025-05-14 PROCEDURE — 83036 HEMOGLOBIN GLYCOSYLATED A1C: CPT

## 2025-05-14 PROCEDURE — 84439 ASSAY OF FREE THYROXINE: CPT

## 2025-05-14 PROCEDURE — 85027 COMPLETE CBC AUTOMATED: CPT

## 2025-05-14 PROCEDURE — 82570 ASSAY OF URINE CREATININE: CPT

## 2025-05-14 PROCEDURE — 36415 COLL VENOUS BLD VENIPUNCTURE: CPT | Mod: PN

## 2025-05-14 PROCEDURE — 80061 LIPID PANEL: CPT

## 2025-05-14 PROCEDURE — 84443 ASSAY THYROID STIM HORMONE: CPT

## 2025-05-14 PROCEDURE — 80053 COMPREHEN METABOLIC PANEL: CPT

## 2025-05-15 LAB
ALBUMIN SERPL BCP-MCNC: 4.1 G/DL (ref 3.5–5.2)
ALP SERPL-CCNC: 51 UNIT/L (ref 40–150)
ALT SERPL W/O P-5'-P-CCNC: 26 UNIT/L (ref 10–44)
ANION GAP (OHS): 13 MMOL/L (ref 8–16)
AST SERPL-CCNC: 28 UNIT/L (ref 11–45)
BILIRUB SERPL-MCNC: 0.6 MG/DL (ref 0.1–1)
BUN SERPL-MCNC: 18 MG/DL (ref 8–23)
CALCIUM SERPL-MCNC: 9.6 MG/DL (ref 8.7–10.5)
CHLORIDE SERPL-SCNC: 104 MMOL/L (ref 95–110)
CHOLEST SERPL-MCNC: 154 MG/DL (ref 120–199)
CHOLEST/HDLC SERPL: 3.2 {RATIO} (ref 2–5)
CO2 SERPL-SCNC: 24 MMOL/L (ref 23–29)
CREAT SERPL-MCNC: 1 MG/DL (ref 0.5–1.4)
GFR SERPLBLD CREATININE-BSD FMLA CKD-EPI: >60 ML/MIN/1.73/M2
GLUCOSE SERPL-MCNC: 126 MG/DL (ref 70–110)
HDLC SERPL-MCNC: 48 MG/DL (ref 40–75)
HDLC SERPL: 31.2 % (ref 20–50)
LDLC SERPL CALC-MCNC: 92.2 MG/DL (ref 63–159)
NONHDLC SERPL-MCNC: 106 MG/DL
POTASSIUM SERPL-SCNC: 4.3 MMOL/L (ref 3.5–5.1)
PROT SERPL-MCNC: 7.3 GM/DL (ref 6–8.4)
SODIUM SERPL-SCNC: 141 MMOL/L (ref 136–145)
T4 FREE SERPL-MCNC: 1 NG/DL (ref 0.71–1.51)
TRIGL SERPL-MCNC: 69 MG/DL (ref 30–150)
TSH SERPL-ACNC: 1.46 UIU/ML (ref 0.4–4)

## 2025-05-21 ENCOUNTER — LAB VISIT (OUTPATIENT)
Dept: LAB | Facility: HOSPITAL | Age: 65
End: 2025-05-21
Attending: FAMILY MEDICINE
Payer: MEDICARE

## 2025-05-21 ENCOUNTER — OFFICE VISIT (OUTPATIENT)
Dept: PRIMARY CARE CLINIC | Facility: CLINIC | Age: 65
End: 2025-05-21
Payer: MEDICARE

## 2025-05-21 VITALS
SYSTOLIC BLOOD PRESSURE: 138 MMHG | BODY MASS INDEX: 29.26 KG/M2 | OXYGEN SATURATION: 98 % | HEIGHT: 72 IN | WEIGHT: 216 LBS | TEMPERATURE: 97 F | RESPIRATION RATE: 17 BRPM | HEART RATE: 77 BPM | DIASTOLIC BLOOD PRESSURE: 70 MMHG

## 2025-05-21 DIAGNOSIS — Z87.891 PERSONAL HISTORY OF TOBACCO USE, PRESENTING HAZARDS TO HEALTH: ICD-10-CM

## 2025-05-21 DIAGNOSIS — I10 HYPERTENSION, UNSPECIFIED TYPE: ICD-10-CM

## 2025-05-21 DIAGNOSIS — E78.5 HYPERLIPIDEMIA, UNSPECIFIED HYPERLIPIDEMIA TYPE: ICD-10-CM

## 2025-05-21 DIAGNOSIS — R35.1 BENIGN PROSTATIC HYPERPLASIA WITH NOCTURIA: ICD-10-CM

## 2025-05-21 DIAGNOSIS — R97.20 ELEVATED PSA: ICD-10-CM

## 2025-05-21 DIAGNOSIS — N40.1 BENIGN PROSTATIC HYPERPLASIA WITH NOCTURIA: ICD-10-CM

## 2025-05-21 DIAGNOSIS — E11.9 TYPE 2 DIABETES MELLITUS WITHOUT COMPLICATION, WITHOUT LONG-TERM CURRENT USE OF INSULIN: Primary | ICD-10-CM

## 2025-05-21 DIAGNOSIS — N40.0 BENIGN PROSTATIC HYPERPLASIA WITHOUT LOWER URINARY TRACT SYMPTOMS: ICD-10-CM

## 2025-05-21 DIAGNOSIS — N52.9 ERECTILE DYSFUNCTION, UNSPECIFIED ERECTILE DYSFUNCTION TYPE: ICD-10-CM

## 2025-05-21 LAB — PSA SERPL-MCNC: 6.05 NG/ML

## 2025-05-21 PROCEDURE — 3075F SYST BP GE 130 - 139MM HG: CPT | Mod: CPTII,S$GLB,, | Performed by: FAMILY MEDICINE

## 2025-05-21 PROCEDURE — 3060F POS MICROALBUMINURIA REV: CPT | Mod: CPTII,S$GLB,, | Performed by: FAMILY MEDICINE

## 2025-05-21 PROCEDURE — 1160F RVW MEDS BY RX/DR IN RCRD: CPT | Mod: CPTII,S$GLB,, | Performed by: FAMILY MEDICINE

## 2025-05-21 PROCEDURE — 3288F FALL RISK ASSESSMENT DOCD: CPT | Mod: CPTII,S$GLB,, | Performed by: FAMILY MEDICINE

## 2025-05-21 PROCEDURE — 84153 ASSAY OF PSA TOTAL: CPT

## 2025-05-21 PROCEDURE — 3008F BODY MASS INDEX DOCD: CPT | Mod: CPTII,S$GLB,, | Performed by: FAMILY MEDICINE

## 2025-05-21 PROCEDURE — 1126F AMNT PAIN NOTED NONE PRSNT: CPT | Mod: CPTII,S$GLB,, | Performed by: FAMILY MEDICINE

## 2025-05-21 PROCEDURE — 1101F PT FALLS ASSESS-DOCD LE1/YR: CPT | Mod: CPTII,S$GLB,, | Performed by: FAMILY MEDICINE

## 2025-05-21 PROCEDURE — 99214 OFFICE O/P EST MOD 30 MIN: CPT | Mod: S$GLB,,, | Performed by: FAMILY MEDICINE

## 2025-05-21 PROCEDURE — 3066F NEPHROPATHY DOC TX: CPT | Mod: CPTII,S$GLB,, | Performed by: FAMILY MEDICINE

## 2025-05-21 PROCEDURE — 36415 COLL VENOUS BLD VENIPUNCTURE: CPT | Mod: PN

## 2025-05-21 PROCEDURE — 99999 PR PBB SHADOW E&M-EST. PATIENT-LVL IV: CPT | Mod: PBBFAC,,, | Performed by: FAMILY MEDICINE

## 2025-05-21 PROCEDURE — 3072F LOW RISK FOR RETINOPATHY: CPT | Mod: CPTII,S$GLB,, | Performed by: FAMILY MEDICINE

## 2025-05-21 PROCEDURE — G2211 COMPLEX E/M VISIT ADD ON: HCPCS | Mod: S$GLB,,, | Performed by: FAMILY MEDICINE

## 2025-05-21 PROCEDURE — 3078F DIAST BP <80 MM HG: CPT | Mod: CPTII,S$GLB,, | Performed by: FAMILY MEDICINE

## 2025-05-21 PROCEDURE — 3044F HG A1C LEVEL LT 7.0%: CPT | Mod: CPTII,S$GLB,, | Performed by: FAMILY MEDICINE

## 2025-05-21 PROCEDURE — 1159F MED LIST DOCD IN RCRD: CPT | Mod: CPTII,S$GLB,, | Performed by: FAMILY MEDICINE

## 2025-05-21 RX ORDER — ATORVASTATIN CALCIUM 20 MG/1
20 TABLET, FILM COATED ORAL NIGHTLY
Qty: 90 TABLET | Refills: 3 | Status: SHIPPED | OUTPATIENT
Start: 2025-05-21

## 2025-05-21 NOTE — Clinical Note
Pt saw you in Dec 2024, and had elevated psa. In your note you mentioned about getting prostate MRI if high but I don't see where there was an order placed. I am repeating diagnostic PSA and I cc'ed you on the result. I mentioned to the patient that you would contact him about scheduling imaging if needed based on this result. If you want to get him back in also, feel free to reach out to him. Thanks. Jaky Quinn MD

## 2025-05-21 NOTE — TELEPHONE ENCOUNTER
No care due was identified.  [facility] Embedded Care Due Messages. Reference number: [id number].   5/21/2025 11:17:48 AM CDT

## 2025-05-21 NOTE — PROGRESS NOTES
Chief Complaint  Chief Complaint   Patient presents with    Follow-up     6 months        HPI  Rafa Padilla is a 64 y.o. male with multiple medical diagnoses as listed in the medical history and problem list that presents for  in person visit.     History of Present Illness              No questionnaires on file.       Pmh, Psh, Family Hx, Social Hx, HM updated in Epic Tabs today.    Review of Systems   Constitutional:  Negative for activity change, appetite change, chills, fatigue and unexpected weight change.   HENT:  Negative for congestion, ear pain, postnasal drip, sneezing, sore throat and trouble swallowing.    Eyes:  Negative for pain and visual disturbance.   Respiratory:  Negative for cough and shortness of breath.    Cardiovascular:  Negative for chest pain and leg swelling.   Gastrointestinal:  Negative for abdominal pain, constipation, diarrhea, nausea and vomiting.   Endocrine: Negative for cold intolerance and heat intolerance.   Genitourinary:  Negative for difficulty urinating, dysuria and flank pain.   Musculoskeletal:  Negative for arthralgias, back pain, joint swelling and neck pain.   Skin:  Negative for color change and rash.   Neurological:  Negative for dizziness, seizures and headaches.   Psychiatric/Behavioral:  Negative for behavioral problems, dysphoric mood and sleep disturbance. The patient is not nervous/anxious.         Objective:     Vitals:    05/21/25 1019   BP: 138/70   BP Location: Right arm   Patient Position: Sitting   Pulse: 77   Resp: 17   Temp: 97.3 °F (36.3 °C)   TempSrc: Tympanic   SpO2: 98%   Weight: 98 kg (216 lb)   Height: 6' (1.829 m)     Wt Readings from Last 10 Encounters:   05/21/25 98 kg (216 lb)   12/11/24 101.5 kg (223 lb 12.3 oz)   11/20/24 101.5 kg (223 lb 12.3 oz)   06/14/24 101 kg (222 lb 10.6 oz)   05/27/24 97.1 kg (214 lb 1.1 oz)   03/06/24 103 kg (227 lb)   03/05/24 103 kg (227 lb 1.2 oz)   02/20/24 103.8 kg (228 lb 13.4 oz)   08/31/23 99.6 kg (219 lb  9.3 oz)   07/03/23 98.8 kg (217 lb 13 oz)     Physical Exam            Physical Exam  Vitals reviewed.   Constitutional:       General: He is not in acute distress.     Appearance: Normal appearance. He is well-developed. He is obese.   HENT:      Head: Normocephalic and atraumatic.      Right Ear: Tympanic membrane and external ear normal.      Left Ear: Tympanic membrane and external ear normal.      Nose: Nose normal.      Mouth/Throat:      Mouth: Mucous membranes are moist.      Pharynx: Oropharynx is clear.   Eyes:      Conjunctiva/sclera: Conjunctivae normal.      Pupils: Pupils are equal, round, and reactive to light.   Neck:      Thyroid: No thyromegaly.   Cardiovascular:      Rate and Rhythm: Normal rate and regular rhythm.      Heart sounds: No murmur heard.     No friction rub. No gallop.   Pulmonary:      Effort: Pulmonary effort is normal. No respiratory distress.      Breath sounds: Normal breath sounds.   Abdominal:      General: Bowel sounds are normal. There is no distension.      Palpations: Abdomen is soft.      Tenderness: There is no abdominal tenderness. There is no rebound.   Musculoskeletal:         General: Normal range of motion.      Cervical back: Normal range of motion and neck supple.   Lymphadenopathy:      Cervical: No cervical adenopathy.   Skin:     General: Skin is warm and dry.   Neurological:      General: No focal deficit present.      Mental Status: He is alert and oriented to person, place, and time.      Coordination: Coordination normal.   Psychiatric:         Attention and Perception: Attention normal.         Mood and Affect: Mood and affect normal.         Speech: Speech normal.         Behavior: Behavior normal.         Thought Content: Thought content normal.         Cognition and Memory: Cognition normal.         Judgment: Judgment normal.         Assessment:   LABS:   Lab Results   Component Value Date    HGBA1C 6.3 (H) 05/14/2025    HGBA1C 6.3 (H) 11/13/2024     HGBA1C 6.5 (H) 06/05/2024      Lab Results   Component Value Date    CHOL 154 05/14/2025    CHOL 115 (L) 06/05/2024    CHOL 137 02/20/2024     Lab Results   Component Value Date    LDLCALC 92.2 05/14/2025    LDLCALC 56.4 (L) 06/05/2024    LDLCALC 65.0 02/20/2024     Lab Results   Component Value Date    WBC 7.83 05/14/2025    HGB 16.1 05/14/2025    HCT 49.7 05/14/2025     05/14/2025    CHOL 154 05/14/2025    TRIG 69 05/14/2025    HDL 48 05/14/2025    ALT 26 05/14/2025    AST 28 05/14/2025     05/14/2025    K 4.3 05/14/2025     05/14/2025    CREATININE 1.0 05/14/2025    BUN 18 05/14/2025    CO2 24 05/14/2025    TSH 1.457 05/14/2025    PSA 5.1 (H) 02/20/2024    GLUF 109 12/18/2009    HGBA1C 6.3 (H) 05/14/2025       Plan:   1. Type 2 diabetes mellitus without complication, without long-term current use of insulin  -     Ambulatory referral/consult to Optometry; Future; Expected date: 05/28/2025    2. Benign prostatic hyperplasia without lower urinary tract symptoms    3. Hyperlipidemia, unspecified hyperlipidemia type    4. Hypertension, unspecified type    5. Erectile dysfunction, unspecified erectile dysfunction type    6. Elevated PSA  -     Prostate Specific Antigen, Diagnostic; Future; Expected date: 05/21/2025    7. Benign prostatic hyperplasia with nocturia  -     Prostate Specific Antigen, Diagnostic; Future; Expected date: 05/21/2025    8. Personal history of tobacco use, presenting hazards to health  -     CT Chest Lung Screening Low Dose; Future; Expected date: 05/21/2025      Assessment & Plan                US Abdominal Aorta  Narrative: EXAM: US ABDOMINAL AORTA    CLINICAL HISTORY: [Z13.6]-Encounter for screening for cardiovascular disorders.    COMPARISON:  No relevant prior studies.    TECHNIQUE:  Real-time sonography of the abdominal aorta with color and spectral Doppler.    FINDINGS:  The abdominal aorta is without evidence of aneurysm.  The proximal abdominal aorta measures 2.4 x  2.5 cm in maximum diameter.  Mid abdominal aorta measures 2.0 x 1.9 cm in maximum diameter.  Distal abdominal aorta measures 1.6 x 1.6 cm in maximum diameter.    The common iliac arteries are normal in caliber.  Impression: No evidence of abdominal aortic aneurysm with maximum aortic diameter of 2.5 cm.  See below recommendations.    The Society of Vascular Surgery practice guidelines on the care of patients with an abdominal aortic aneurysm (www.jvascsurg.org/article/-4736(10)83417-8/fulltext):  For aortic diameter >2.5 cm but <3 cm, rescreen after 10 years.  For AAA diameter 3.0-3.9 cm, follow-up in 3 years recommended.  For AAA diameter 4.0-4.9 cm, follow-up in 1 year is recommended.  For AAA diameter 5.0-5.4 cm, follow-up in 6 months is recommended.  For AAA diameter 5.5 cm or greater, referral to a vascular surgeon is recommended.    However, for female patients, current smoking history, or history of diabetes, rate of aneurysm rupture is increased and should be considered in determining follow-up interval.    Report Date:  11/21/2024 3:44 PM    Finalized on: 11/21/2024 3:44 PM By:  Radha Cruz MD  R# [id number]      2024-11-21 15:46:[id number]    DIAZ    The 10-year ASCVD risk score (Sharath DEY, et al., 2019) is: 46.5%    Values used to calculate the score:      Age: 64 years      Sex: Male      Is Non- : Yes      Diabetic: Yes      Tobacco smoker: Yes      Systolic Blood Pressure: 138 mmHg      Is BP treated: Yes      HDL Cholesterol: 48 mg/dL      Total Cholesterol: 154 mg/dL    Follow-up: Follow up in about 6 months (around 11/21/2025) for RAÚL Kraft NP - 6 mo f.u htn, chol, dm .    I spent a total of  30     minutes face to face and non-face to face on the date of this visit.This includes time preparing to see the patient (eg, review of tests, notes), obtaining and/or reviewing additional history from an independent historian and/or outside medical records, documenting  clinical information in the electronic health record, independently interpreting results and/or communicating results to the patient/family/caregiver, or care coordinator.  Visit today included increased complexity associated with the care of the episodic problem addressed and managing the longitudinal care of the patient due to the serious and/or complex managed problem(s).    This note was generated with the assistance of ambient listening technology. Verbal consent was obtained by the patient and accompanying visitor(s) for the recording of patient appointment to facilitate this note. I attest to having reviewed and edited the generated note for accuracy, though some syntax or spelling errors may persist. Please contact the author of this note for any clarification.       There are no Patient Instructions on file for this visit.

## 2025-05-29 ENCOUNTER — RESULTS FOLLOW-UP (OUTPATIENT)
Dept: PRIMARY CARE CLINIC | Facility: CLINIC | Age: 65
End: 2025-05-29

## 2025-05-29 DIAGNOSIS — Z87.891 PERSONAL HISTORY OF TOBACCO USE, PRESENTING HAZARDS TO HEALTH: Primary | ICD-10-CM

## 2025-05-29 NOTE — PROGRESS NOTES
FYI, PSA is >6. Last visit with you was in 12/11/24. You recommended prostate MRI if elevated on Dec 2024 psa level ( which is was) but looks like MRI never done. So I repeated it since he was at annual and still elevated, so wanted to get him back on your schedule to discuss and order appropriate next steps. Thanks. Jaky Quinn MD

## 2025-06-06 ENCOUNTER — TELEPHONE (OUTPATIENT)
Dept: ADMINISTRATIVE | Facility: CLINIC | Age: 65
End: 2025-06-06
Payer: MEDICARE

## 2025-06-13 ENCOUNTER — TELEPHONE (OUTPATIENT)
Dept: ADMINISTRATIVE | Facility: CLINIC | Age: 65
End: 2025-06-13
Payer: MEDICARE

## 2025-06-13 NOTE — TELEPHONE ENCOUNTER
Called pt; no answer; left message informing pt I was calling to reschedule 6/13/25 eawv home visit appt; left my name and number

## 2025-06-23 ENCOUNTER — HOSPITAL ENCOUNTER (OUTPATIENT)
Dept: RADIOLOGY | Facility: HOSPITAL | Age: 65
Discharge: HOME | End: 2025-06-23
Attending: FAMILY MEDICINE
Payer: MEDICARE

## 2025-06-23 DIAGNOSIS — Z00.00 ENCOUNTER FOR MEDICARE ANNUAL WELLNESS EXAM: ICD-10-CM

## 2025-06-23 DIAGNOSIS — Z87.891 PERSONAL HISTORY OF TOBACCO USE, PRESENTING HAZARDS TO HEALTH: ICD-10-CM

## 2025-06-23 PROCEDURE — 71271 CT THORAX LUNG CANCER SCR C-: CPT | Mod: 26,,, | Performed by: STUDENT IN AN ORGANIZED HEALTH CARE EDUCATION/TRAINING PROGRAM

## 2025-06-23 PROCEDURE — 71271 CT THORAX LUNG CANCER SCR C-: CPT | Mod: TC

## 2025-07-28 ENCOUNTER — TELEPHONE (OUTPATIENT)
Facility: CLINIC | Age: 65
End: 2025-07-28
Payer: MEDICARE

## 2025-07-28 DIAGNOSIS — R97.20 ELEVATED PSA: Primary | ICD-10-CM

## 2025-07-28 NOTE — TELEPHONE ENCOUNTER
----- Message from Arun Holland MD sent at 7/28/2025  2:12 PM CDT -----  Please schedule MRI and have him f/u with me to discuss after, thanks

## 2025-07-28 NOTE — TELEPHONE ENCOUNTER
Spoke to the pt and got him scheduled for creatinine, MRI, and follow up as requested by Dr. Holland.     ----- Message from Arun Holland MD sent at 7/28/2025  2:12 PM CDT -----  Please schedule MRI and have him f/u with me to discuss after, thanks

## 2025-08-15 ENCOUNTER — LAB VISIT (OUTPATIENT)
Dept: LAB | Facility: HOSPITAL | Age: 65
End: 2025-08-15
Attending: UROLOGY
Payer: MEDICARE

## 2025-08-15 DIAGNOSIS — R97.20 ELEVATED PSA: ICD-10-CM

## 2025-08-15 LAB
CREAT SERPL-MCNC: 1 MG/DL (ref 0.5–1.4)
GFR SERPLBLD CREATININE-BSD FMLA CKD-EPI: >60 ML/MIN/1.73/M2

## 2025-08-15 PROCEDURE — 36415 COLL VENOUS BLD VENIPUNCTURE: CPT

## 2025-08-15 PROCEDURE — 82565 ASSAY OF CREATININE: CPT

## 2025-08-18 ENCOUNTER — HOSPITAL ENCOUNTER (OUTPATIENT)
Dept: RADIOLOGY | Facility: HOSPITAL | Age: 65
Discharge: HOME | End: 2025-08-18
Attending: UROLOGY
Payer: MEDICARE

## 2025-08-18 DIAGNOSIS — R97.20 ELEVATED PSA: ICD-10-CM

## 2025-08-18 PROCEDURE — 25500020 PHARM REV CODE 255: Performed by: UROLOGY

## 2025-08-18 PROCEDURE — 72197 MRI PELVIS W/O & W/DYE: CPT | Mod: TC

## 2025-08-18 PROCEDURE — A9585 GADOBUTROL INJECTION: HCPCS | Performed by: UROLOGY

## 2025-08-18 PROCEDURE — 72197 MRI PELVIS W/O & W/DYE: CPT | Mod: 26,,, | Performed by: RADIOLOGY

## 2025-08-18 RX ORDER — GADOBUTROL 604.72 MG/ML
10 INJECTION INTRAVENOUS
Status: COMPLETED | OUTPATIENT
Start: 2025-08-18 | End: 2025-08-18

## 2025-08-18 RX ADMIN — GADOBUTROL 10 ML: 604.72 INJECTION INTRAVENOUS at 11:08

## 2025-08-19 ENCOUNTER — OFFICE VISIT (OUTPATIENT)
Dept: UROLOGY | Facility: CLINIC | Age: 65
End: 2025-08-19
Payer: MEDICARE

## 2025-08-19 ENCOUNTER — HOSPITAL ENCOUNTER (OUTPATIENT)
Dept: CARDIOLOGY | Facility: HOSPITAL | Age: 65
Discharge: HOME | End: 2025-08-19
Attending: UROLOGY
Payer: MEDICARE

## 2025-08-19 ENCOUNTER — HOSPITAL ENCOUNTER (OUTPATIENT)
Dept: RADIOLOGY | Facility: HOSPITAL | Age: 65
Discharge: HOME | End: 2025-08-19
Attending: UROLOGY
Payer: MEDICARE

## 2025-08-19 VITALS
HEART RATE: 73 BPM | BODY MASS INDEX: 29.09 KG/M2 | SYSTOLIC BLOOD PRESSURE: 160 MMHG | WEIGHT: 214.75 LBS | HEIGHT: 72 IN | DIASTOLIC BLOOD PRESSURE: 87 MMHG

## 2025-08-19 DIAGNOSIS — Z01.818 PRE-OP TESTING: ICD-10-CM

## 2025-08-19 DIAGNOSIS — D49.4 BLADDER TUMOR: Primary | ICD-10-CM

## 2025-08-19 LAB
OHS QRS DURATION: 88 MS
OHS QTC CALCULATION: 388 MS

## 2025-08-19 PROCEDURE — 93005 ELECTROCARDIOGRAM TRACING: CPT

## 2025-08-19 PROCEDURE — 71046 X-RAY EXAM CHEST 2 VIEWS: CPT | Mod: TC

## 2025-08-19 PROCEDURE — 93010 ELECTROCARDIOGRAM REPORT: CPT | Mod: S$GLB,,, | Performed by: INTERNAL MEDICINE

## 2025-08-19 PROCEDURE — 99999 PR PBB SHADOW E&M-EST. PATIENT-LVL V: CPT | Mod: PBBFAC,,, | Performed by: UROLOGY

## 2025-08-19 RX ORDER — CEFAZOLIN SODIUM 2 G/50ML
2 SOLUTION INTRAVENOUS
OUTPATIENT
Start: 2025-08-19

## 2025-08-21 ENCOUNTER — TELEPHONE (OUTPATIENT)
Dept: UROLOGY | Facility: CLINIC | Age: 65
End: 2025-08-21
Payer: MEDICARE

## 2025-09-03 ENCOUNTER — TELEPHONE (OUTPATIENT)
Dept: UROLOGY | Facility: CLINIC | Age: 65
End: 2025-09-03
Payer: MEDICARE

## 2025-09-04 ENCOUNTER — HOSPITAL ENCOUNTER (OUTPATIENT)
Dept: CARDIOLOGY | Facility: HOSPITAL | Age: 65
Discharge: HOME | End: 2025-09-04
Payer: MEDICARE

## 2025-09-04 ENCOUNTER — OFFICE VISIT (OUTPATIENT)
Dept: INTERNAL MEDICINE | Facility: CLINIC | Age: 65
End: 2025-09-04
Payer: MEDICARE

## 2025-09-04 VITALS
SYSTOLIC BLOOD PRESSURE: 150 MMHG | TEMPERATURE: 98 F | OXYGEN SATURATION: 97 % | HEART RATE: 66 BPM | DIASTOLIC BLOOD PRESSURE: 88 MMHG

## 2025-09-04 DIAGNOSIS — N52.9 ERECTILE DYSFUNCTION, UNSPECIFIED ERECTILE DYSFUNCTION TYPE: ICD-10-CM

## 2025-09-04 DIAGNOSIS — N40.0 BENIGN PROSTATIC HYPERPLASIA WITHOUT LOWER URINARY TRACT SYMPTOMS: ICD-10-CM

## 2025-09-04 DIAGNOSIS — D49.4 BLADDER TUMOR: ICD-10-CM

## 2025-09-04 DIAGNOSIS — E78.5 HYPERLIPIDEMIA, UNSPECIFIED HYPERLIPIDEMIA TYPE: ICD-10-CM

## 2025-09-04 DIAGNOSIS — F17.218 CIGARETTE NICOTINE DEPENDENCE WITH OTHER NICOTINE-INDUCED DISORDER: ICD-10-CM

## 2025-09-04 DIAGNOSIS — E11.9 TYPE 2 DIABETES MELLITUS WITHOUT COMPLICATION, WITHOUT LONG-TERM CURRENT USE OF INSULIN: ICD-10-CM

## 2025-09-04 DIAGNOSIS — D49.4 BLADDER TUMOR: Primary | ICD-10-CM

## 2025-09-04 DIAGNOSIS — I10 HYPERTENSION, UNSPECIFIED TYPE: ICD-10-CM

## 2025-09-04 DIAGNOSIS — Z01.818 PRE-OP EVALUATION: ICD-10-CM

## 2025-09-04 LAB
OHS QRS DURATION: 86 MS
OHS QTC CALCULATION: 409 MS

## 2025-09-04 PROCEDURE — 99999 PR PBB SHADOW E&M-EST. PATIENT-LVL II: CPT | Mod: PBBFAC,,, | Performed by: NURSE PRACTITIONER

## 2025-09-04 PROCEDURE — 93005 ELECTROCARDIOGRAM TRACING: CPT

## 2025-09-05 ENCOUNTER — TELEPHONE (OUTPATIENT)
Dept: PREADMISSION TESTING | Facility: HOSPITAL | Age: 65
End: 2025-09-05
Payer: MEDICARE